# Patient Record
Sex: FEMALE | Race: WHITE | Employment: FULL TIME | URBAN - METROPOLITAN AREA
[De-identification: names, ages, dates, MRNs, and addresses within clinical notes are randomized per-mention and may not be internally consistent; named-entity substitution may affect disease eponyms.]

---

## 2017-08-14 ENCOUNTER — OFFICE VISIT (OUTPATIENT)
Dept: ORTHOPEDIC SURGERY | Age: 57
End: 2017-08-14

## 2017-08-14 VITALS
BODY MASS INDEX: 44.41 KG/M2 | DIASTOLIC BLOOD PRESSURE: 89 MMHG | WEIGHT: 293 LBS | HEART RATE: 103 BPM | SYSTOLIC BLOOD PRESSURE: 134 MMHG | HEIGHT: 68 IN

## 2017-08-14 DIAGNOSIS — G57.91 NEUROPATHY OF RIGHT LOWER EXTREMITY: ICD-10-CM

## 2017-08-14 DIAGNOSIS — Z96.651 STATUS POST TOTAL RIGHT KNEE REPLACEMENT: Primary | ICD-10-CM

## 2017-08-14 DIAGNOSIS — M54.41 LOW BACK PAIN WITH RADIATION, RIGHT: ICD-10-CM

## 2017-08-14 DIAGNOSIS — Z96.659 PAIN DUE TO TOTAL KNEE REPLACEMENT, SUBSEQUENT ENCOUNTER: ICD-10-CM

## 2017-08-14 DIAGNOSIS — T84.84XD PAIN DUE TO TOTAL KNEE REPLACEMENT, SUBSEQUENT ENCOUNTER: ICD-10-CM

## 2017-08-14 PROCEDURE — 73564 X-RAY EXAM KNEE 4 OR MORE: CPT | Performed by: ORTHOPAEDIC SURGERY

## 2017-08-14 PROCEDURE — 99214 OFFICE O/P EST MOD 30 MIN: CPT | Performed by: ORTHOPAEDIC SURGERY

## 2017-08-14 RX ORDER — DULOXETIN HYDROCHLORIDE 60 MG/1
60 CAPSULE, DELAYED RELEASE ORAL NIGHTLY
COMMUNITY
Start: 2017-07-22

## 2017-08-24 ENCOUNTER — HOSPITAL ENCOUNTER (OUTPATIENT)
Dept: PREADMISSION TESTING | Age: 57
Discharge: HOME | End: 2017-08-24
Admitting: ORTHOPAEDIC SURGERY

## 2017-08-24 ENCOUNTER — TELEPHONE (OUTPATIENT)
Dept: ORTHOPEDIC SURGERY | Age: 57
End: 2017-08-24

## 2017-08-24 VITALS — HEIGHT: 68 IN | WEIGHT: 293 LBS | BODY MASS INDEX: 44.41 KG/M2

## 2017-08-28 ENCOUNTER — OFFICE VISIT (OUTPATIENT)
Dept: ORTHOPEDIC SURGERY | Age: 57
End: 2017-08-28

## 2017-08-28 VITALS
HEIGHT: 68 IN | DIASTOLIC BLOOD PRESSURE: 78 MMHG | WEIGHT: 293 LBS | SYSTOLIC BLOOD PRESSURE: 138 MMHG | HEART RATE: 107 BPM | BODY MASS INDEX: 44.41 KG/M2

## 2017-08-28 DIAGNOSIS — Z96.651 STATUS POST TOTAL RIGHT KNEE REPLACEMENT: Primary | ICD-10-CM

## 2017-08-28 DIAGNOSIS — T84.012S FAILED TOTAL KNEE, RIGHT, SEQUELA: ICD-10-CM

## 2017-08-28 PROCEDURE — 99213 OFFICE O/P EST LOW 20 MIN: CPT | Performed by: ORTHOPAEDIC SURGERY

## 2017-08-28 RX ORDER — OXYCODONE HYDROCHLORIDE 10 MG/1
10 TABLET ORAL EVERY 4 HOURS PRN
Qty: 40 TABLET | Refills: 0 | Status: SHIPPED | OUTPATIENT
Start: 2017-08-28 | End: 2017-09-08

## 2017-09-01 ENCOUNTER — HOSPITAL ENCOUNTER (OUTPATIENT)
Dept: SURGERY | Age: 57
Discharge: HOME | End: 2017-09-01
Admitting: ORTHOPAEDIC SURGERY

## 2017-09-01 VITALS
HEART RATE: 96 BPM | OXYGEN SATURATION: 96 % | TEMPERATURE: 98.1 F | HEIGHT: 68 IN | SYSTOLIC BLOOD PRESSURE: 135 MMHG | RESPIRATION RATE: 14 BRPM | WEIGHT: 293 LBS | DIASTOLIC BLOOD PRESSURE: 76 MMHG | BODY MASS INDEX: 44.41 KG/M2

## 2017-09-01 RX ORDER — SODIUM CHLORIDE, SODIUM LACTATE, POTASSIUM CHLORIDE, CALCIUM CHLORIDE 600; 310; 30; 20 MG/100ML; MG/100ML; MG/100ML; MG/100ML
INJECTION, SOLUTION INTRAVENOUS CONTINUOUS
Status: DISCONTINUED | OUTPATIENT
Start: 2017-09-01 | End: 2017-09-02 | Stop reason: HOSPADM

## 2017-09-01 RX ORDER — SODIUM CHLORIDE 0.9 % (FLUSH) 0.9 %
10 SYRINGE (ML) INJECTION PRN
Status: DISCONTINUED | OUTPATIENT
Start: 2017-09-01 | End: 2017-09-02 | Stop reason: HOSPADM

## 2017-09-01 RX ORDER — HYDROCODONE BITARTRATE AND ACETAMINOPHEN 7.5; 325 MG/1; MG/1
2 TABLET ORAL EVERY 4 HOURS PRN
Status: DISCONTINUED | OUTPATIENT
Start: 2017-09-01 | End: 2017-09-02 | Stop reason: HOSPADM

## 2017-09-01 RX ORDER — MEPERIDINE HYDROCHLORIDE 25 MG/ML
12.5 INJECTION INTRAMUSCULAR; INTRAVENOUS; SUBCUTANEOUS EVERY 5 MIN PRN
Status: DISCONTINUED | OUTPATIENT
Start: 2017-09-01 | End: 2017-09-02 | Stop reason: HOSPADM

## 2017-09-01 RX ORDER — SCOLOPAMINE TRANSDERMAL SYSTEM 1 MG/1
1 PATCH, EXTENDED RELEASE TRANSDERMAL ONCE
Status: DISCONTINUED | OUTPATIENT
Start: 2017-09-01 | End: 2017-09-01

## 2017-09-01 RX ORDER — FENTANYL CITRATE 50 UG/ML
25 INJECTION, SOLUTION INTRAMUSCULAR; INTRAVENOUS EVERY 5 MIN PRN
Status: DISCONTINUED | OUTPATIENT
Start: 2017-09-01 | End: 2017-09-02 | Stop reason: HOSPADM

## 2017-09-01 RX ORDER — SODIUM CHLORIDE 0.9 % (FLUSH) 0.9 %
10 SYRINGE (ML) INJECTION EVERY 12 HOURS SCHEDULED
Status: DISCONTINUED | OUTPATIENT
Start: 2017-09-01 | End: 2017-09-02 | Stop reason: HOSPADM

## 2017-09-01 RX ORDER — LORAZEPAM 2 MG/ML
0.5 INJECTION INTRAMUSCULAR EVERY 6 HOURS PRN
Status: DISCONTINUED | OUTPATIENT
Start: 2017-09-01 | End: 2017-09-02 | Stop reason: HOSPADM

## 2017-09-01 RX ORDER — ONDANSETRON 2 MG/ML
4 INJECTION INTRAMUSCULAR; INTRAVENOUS
Status: ACTIVE | OUTPATIENT
Start: 2017-09-01 | End: 2017-09-01

## 2017-09-01 RX ORDER — LABETALOL HYDROCHLORIDE 5 MG/ML
5 INJECTION, SOLUTION INTRAVENOUS EVERY 10 MIN PRN
Status: DISCONTINUED | OUTPATIENT
Start: 2017-09-01 | End: 2017-09-02 | Stop reason: HOSPADM

## 2017-09-01 RX ORDER — ONDANSETRON 2 MG/ML
4 INJECTION INTRAMUSCULAR; INTRAVENOUS ONCE
Status: COMPLETED | OUTPATIENT
Start: 2017-09-01 | End: 2017-09-01

## 2017-09-01 RX ADMIN — ONDANSETRON 4 MG: 2 INJECTION INTRAMUSCULAR; INTRAVENOUS at 09:03

## 2017-09-01 RX ADMIN — SODIUM CHLORIDE, SODIUM LACTATE, POTASSIUM CHLORIDE, CALCIUM CHLORIDE: 600; 310; 30; 20 INJECTION, SOLUTION INTRAVENOUS at 09:04

## 2017-09-01 ASSESSMENT — PAIN - FUNCTIONAL ASSESSMENT
PAIN_FUNCTIONAL_ASSESSMENT: 0-10
PAIN_FUNCTIONAL_ASSESSMENT: 0-10

## 2017-09-01 ASSESSMENT — PAIN SCALES - GENERAL: PAINLEVEL_OUTOF10: 0

## 2017-09-02 LAB — MRSA SCREEN RT-PCR: NORMAL

## 2017-09-08 ENCOUNTER — OFFICE VISIT (OUTPATIENT)
Dept: ORTHOPEDIC SURGERY | Age: 57
End: 2017-09-08

## 2017-09-08 ENCOUNTER — HOSPITAL ENCOUNTER (OUTPATIENT)
Dept: PHYSICAL THERAPY | Age: 57
Discharge: HOME | End: 2017-09-30
Admitting: ORTHOPAEDIC SURGERY

## 2017-09-08 VITALS
DIASTOLIC BLOOD PRESSURE: 88 MMHG | HEART RATE: 97 BPM | HEIGHT: 68 IN | SYSTOLIC BLOOD PRESSURE: 133 MMHG | WEIGHT: 293 LBS | BODY MASS INDEX: 44.41 KG/M2

## 2017-09-08 DIAGNOSIS — Z96.651 STATUS POST TOTAL RIGHT KNEE REPLACEMENT: Primary | ICD-10-CM

## 2017-09-08 DIAGNOSIS — G89.29 CHRONIC PAIN OF RIGHT KNEE: ICD-10-CM

## 2017-09-08 DIAGNOSIS — M25.561 CHRONIC PAIN OF RIGHT KNEE: ICD-10-CM

## 2017-09-08 PROCEDURE — 99024 POSTOP FOLLOW-UP VISIT: CPT | Performed by: ORTHOPAEDIC SURGERY

## 2017-09-08 NOTE — PLAN OF CARE
[facility]                                                       Physical Therapy Certification    Dear Referring Practitioner: Casandra Keys MD,    We had the pleasure of evaluating the following patient for physical therapy services at [address] [place] [address]. A summary of our findings can be found in the initial assessment below. This includes our plan of care. If you have any questions or concerns regarding these findings, please do not hesitate to contact me at the office phone number checked above. Thank you for the referral.       Physician Signature:_______________________________Date:__________________  By signing above (or electronic signature), therapists plan is approved by physician      Patient: Mk Gutierrez   : 1960   MRN: [id number]  Referring Physician: Referring Practitioner: Casandra Keys MD      Evaluation Date: 2017      Medical Diagnosis Information:  Diagnosis: s/p patella revision  DOS: 17  ICD10: M80.280; M25.561   Treatment Diagnosis: Decreased ROM; decreased strength; gait abnormalities; decreased function; increased swelling                                         Insurance information: PT Insurance Information: [employer]     Precautions/ Contra-indications: revision surgery  Latex Allergy:  [x]NO      []YES  Preferred Language for Healthcare:   [x]English       []other:    SUBJECTIVE: Patient stated complaint: DOS= 17.  revision.  TKR. +ice. Pt has walked down from seeing the MD. She has had many surgeries on her knees in the past. She had a recent revision surgery. She would like to review the HEP, and she wants to do a HEP only. Pt presents without an assistive device. Pain meds prn.      Relevant Medical History: multiple knee surgeries; revision surgery  Functional Disability Index:PT G-Codes  Functional Assessment Tool mobilizations, manipulation. [x] Modalities as needed that may include: thermal agents, E-stim, Biofeedback, US, iontophoresis as indicated  [x] Patient education on joint protection, postural re-education, activity modification, progression of HEP. HEP instruction: Pt received a written HEP today. (see scanned forms)    GOALS:  Patient stated goal: \"Just want a refresher so I do not do too much. \"    Therapist goals for Patient:   Short Term Goals: To be achieved in: 2 weeks  1. Independent in HEP and progression per patient tolerance, in order to prevent re-injury. 2. Patient will have a decrease in pain to facilitate improvement in movement, function, and ADLs as indicated by Functional Deficits. Long Term Goals: To be achieved in: 12 weeks  1. Disability index score of 25% or less for the LEFS to assist with reaching prior level of function. 2. Patient will demonstrate increased [facility]OM to [facility] to allow for proper joint functioning as indicated by patients Functional Deficits. 3. Patient will demonstrate an increase in Strength to good proximal hip strength and control in LE to allow for proper functional mobility as indicated by patients Functional Deficits. 4. Patient will return to household functional activities without increased symptoms or restriction. 5. Pt will return to social activities without symptoms or restrictions.       Electronically signed by:  Sachi Garcia PT

## 2017-09-08 NOTE — FLOWSHEET NOTE
[facility]    Physical Therapy Daily Treatment Note  Date:  2017    Patient Name:  Ruthie Murillo    :  1960  MRN: [id number]  Restrictions/Precautions:    Medical/Treatment Diagnosis Information:  · Diagnosis: s/p patella revision  · DOS: 17  · ICD10: Z96.651; M25.561  · Treatment Diagnosis: Decreased ROM; decreased strength; gait abnormalities; decreased function; increased swelling  Insurance/Certification information:  PT Insurance Information: [employer]  Physician Information:  Referring Practitioner: Jenny Thrasher MD  Plan of care signed (Y/N):     Date of Patient follow up with Physician: 10-9-17    G-Code (if applicable):      Date G-Code Applied:  17  PT G-Codes  Functional Assessment Tool Used: LEFS  Score: 24%  Functional Limitation: Mobility: Walking and moving around  Mobility: Walking and Moving Around Current Status (): At least 60 percent but less than 80 percent impaired, limited or restricted  Mobility: Walking and Moving Around Goal Status ():  At least 1 percent but less than 20 percent impaired, limited or restricted    Progress Note: [x]  Yes  []  No  Next due by: Visit #10      Latex Allergy:  [x]NO      []YES  Preferred Language for Healthcare:   [x]English       []other:    Visit # Insurance Allowable   1      Pain level:  5/10     SUBJECTIVE:  See eval    OBJECTIVE: See eval  Observation:   Test measurements:      RESTRICTIONS/PRECAUTIONS: revision surgery    Exercises/Interventions:   Exercise/Equipment Resistance/Repetitions Other comments   Stretching     Hamstring [id number]    Towel Pull [id number]    Inclined Calf     Hip Flexion     ITB     Groin     Quad                    SLR     Supine 3x10    Abduction 3x10    Adduction 3x10    Prone 3x10    SLR+          Isometrics     Quad sets [id number]         Patellar Glides     Medial     Superior     Inferior          ROM     Sheet Pulls     [employer] [place] Passive Seated EOB [id number]    Active     Weight Shift     Ankle Pumps                    CKC     Calf raises     Wall sits     Step ups     1 leg stand     Squatting     CC TKE     Balance     bridges          PRE     Extension  RANGE:   Flexion  RANGE:        Quantum machines     Leg press      Leg extension     Leg curl          Manual interventions                     Therapeutic Exercise and NMR EXR  [x] (69312) Provided verbal/tactile cueing for activities related to strengthening, flexibility, endurance, ROM for improvements in LE, proximal hip, and core control with self care, mobility, lifting, ambulation.  [] (81243) Provided verbal/tactile cueing for activities related to improving balance, coordination, kinesthetic sense, posture, motor skill, proprioception  to assist with LE, proximal hip, and core control in self care, mobility, lifting, ambulation and eccentric single leg control.      NMR and Therapeutic Activities:    [] (66715 or 43337) Provided verbal/tactile cueing for activities related to improving balance, coordination, kinesthetic sense, posture, motor skill, proprioception and motor activation to allow for proper function of core, proximal hip and LE with self care and ADLs  [] (86185) Gait Re-education- Provided training and instruction to the patient for proper LE, core and proximal hip recruitment and positioning and eccentric body weight control with ambulation re-education including up and down stairs     Home Exercise Program:    [x] (56504) Reviewed/Progressed HEP activities related to strengthening, flexibility, endurance, ROM of core, proximal hip and LE for functional self-care, mobility, lifting and ambulation/stair navigation   [] (80807)Reviewed/Progressed HEP activities related to improving balance, coordination, kinesthetic sense, posture, motor skill, proprioception of core, proximal hip and LE for self care, mobility, lifting, and ambulation/stair navigation      Manual Treatments:  PROM / STM / Oscillations-Mobs:  G-I, II, III, IV (PA's, Inf., Post.)  [] (73673) Provided manual therapy to mobilize LE, proximal hip and/or LS spine soft tissue/joints for the purpose of modulating pain, promoting relaxation,  increasing ROM, reducing/eliminating soft tissue swelling/inflammation/restriction, improving soft tissue extensibility and allowing for proper ROM for normal function with self care, mobility, lifting and ambulation. Modalities:   Ice x 15 min    Charges:  Timed Code Treatment Minutes: 30   Total Treatment Minutes: 60       [] EVAL (LOW) 34481 (typically 20 minutes face-to-face)  [x] EVAL (MOD) 79016 (typically 30 minutes face-to-face)  [] EVAL (HIGH) 71853 (typically 45 minutes face-to-face)  [] RE-EVAL   [x] RQ(28165) x  2   [] IONTO  [] NMR (55065) x      [] VASO  [] Manual (48766) x       [] Other:  [] TA x       [] Mech Traction (07028)  [] ES(attended) (09415)      [] ES (un) (89601):       GOALS:  Patient stated goal: \"Just want a refresher so I do not do too much. \"     Therapist goals for Patient:   Short Term Goals: To be achieved in: 2 weeks  1. Independent in HEP and progression per patient tolerance, in order to prevent re-injury. 2. Patient will have a decrease in pain to facilitate improvement in movement, function, and ADLs as indicated by Functional Deficits.     Long Term Goals: To be achieved in: 12 weeks  1. Disability index score of 25% or less for the LEFS to assist with reaching prior level of function. 2. Patient will demonstrate increased AROM to [facility] to allow for proper joint functioning as indicated by patients Functional Deficits. 3. Patient will demonstrate an increase in Strength to good proximal hip strength and control in LE to allow for proper functional mobility as indicated by patients Functional Deficits. 4. Patient will return to household functional activities without increased symptoms or restriction.    5. Pt will return to

## 2017-09-12 RX ORDER — DIMETHICONE, CAMPHOR (SYNTHETIC), MENTHOL, AND PHENOL 1.1; .5; .625; .5 G/100G; G/100G; G/100G; G/100G
OINTMENT TOPICAL
Status: DISPENSED
Start: 2017-09-12 | End: 2017-09-12

## 2017-10-09 ENCOUNTER — OFFICE VISIT (OUTPATIENT)
Dept: ORTHOPEDIC SURGERY | Age: 57
End: 2017-10-09

## 2017-10-09 VITALS
HEART RATE: 109 BPM | BODY MASS INDEX: 44.41 KG/M2 | DIASTOLIC BLOOD PRESSURE: 84 MMHG | HEIGHT: 68 IN | WEIGHT: 293 LBS | SYSTOLIC BLOOD PRESSURE: 139 MMHG

## 2017-10-09 DIAGNOSIS — Z96.651 S/P REVISION OF TOTAL KNEE, RIGHT: ICD-10-CM

## 2017-10-09 DIAGNOSIS — M25.461 KNEE EFFUSION, RIGHT: ICD-10-CM

## 2017-10-09 PROCEDURE — 99024 POSTOP FOLLOW-UP VISIT: CPT | Performed by: ORTHOPAEDIC SURGERY

## 2017-10-09 PROCEDURE — 73562 X-RAY EXAM OF KNEE 3: CPT | Performed by: ORTHOPAEDIC SURGERY

## 2017-10-09 NOTE — PROGRESS NOTES
History of Present Illness:    Miriam Bal is a 60 y.o. female   . 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 The patella with clunk and synovitis. Surgery was approximately 5 weeks ago. She's doing well, but still has mild pain. She notes her pain as a 5 out of 10 but uses no external support      Chief Complaint , a summary of previous treatments, injury, and current reason for the visit:    Follow-up evaluation for the right knee, status post patella revision on previous rotating platform total knee    Examination:    Examination today confirms wounds are clean. There is no infection. Calves are soft. She is weightbearing as tolerated        Office Procedures:  Her most discomfort and pain is in the anterior aspect of the heel incision site. She does have a sub-vastus approach. Orders Placed This Encounter   Procedures    XR KNEE RIGHT (3 VIEWS)     Order Specific Question:   Reason for exam:     Answer:   Pain and discomfort right knee       X-ray interpretation:  X-rays:    3 view x-rays right knee. Total knee. An anatomic alignment. The area. The patella revision is perfectly aligned    Treatment Plan:  Right total knee with revision patella and synovectomy with mild discomfort, suprapatellar area. Mild patellofemoral inflammation    Final impresson and disposition: She is only 5 weeks from surgery. Recommended. Anti-inflammatories continue therapy. Her discomfort is mainly vastus medialis, which should get better will be seen again in 4 week follow-up                      Johana Murphy. Aleisha Darnell M.D. This dictation was performed with a verbal recognition program and it was checked for errors. It is possible that there are still dictated errors within this office note. Any errors should be brought immediately to my attention for correction.   All efforts were made to ensure that this office note is accurate

## 2017-11-20 ENCOUNTER — OFFICE VISIT (OUTPATIENT)
Dept: ORTHOPEDIC SURGERY | Age: 57
End: 2017-11-20

## 2017-11-20 VITALS
HEART RATE: 107 BPM | WEIGHT: 293 LBS | HEIGHT: 68 IN | SYSTOLIC BLOOD PRESSURE: 120 MMHG | BODY MASS INDEX: 44.41 KG/M2 | DIASTOLIC BLOOD PRESSURE: 72 MMHG

## 2017-11-20 DIAGNOSIS — Z96.651 STATUS POST TOTAL RIGHT KNEE REPLACEMENT: Primary | ICD-10-CM

## 2017-11-20 PROCEDURE — 99024 POSTOP FOLLOW-UP VISIT: CPT | Performed by: ORTHOPAEDIC SURGERY

## 2017-11-20 RX ORDER — MELOXICAM 15 MG/1
15 TABLET ORAL DAILY
Qty: 30 TABLET | Refills: 5 | Status: ON HOLD | OUTPATIENT
Start: 2017-11-20 | End: 2020-08-27

## 2017-11-20 NOTE — PROGRESS NOTES
Review of Systems   Musculoskeletal: Positive for joint pain.      Patient has had no medical changes since last evaluated
Any errors should be brought immediately to my attention for correction.   All efforts were made to ensure that this office note is accurate

## 2017-12-18 ENCOUNTER — OFFICE VISIT (OUTPATIENT)
Dept: ORTHOPEDIC SURGERY | Age: 57
End: 2017-12-18

## 2017-12-18 VITALS
DIASTOLIC BLOOD PRESSURE: 96 MMHG | SYSTOLIC BLOOD PRESSURE: 138 MMHG | BODY MASS INDEX: 44.41 KG/M2 | WEIGHT: 293 LBS | HEIGHT: 68 IN | HEART RATE: 110 BPM

## 2017-12-18 DIAGNOSIS — Z96.651 STATUS POST TOTAL RIGHT KNEE REPLACEMENT: Primary | ICD-10-CM

## 2017-12-18 PROCEDURE — 99213 OFFICE O/P EST LOW 20 MIN: CPT | Performed by: ORTHOPAEDIC SURGERY

## 2017-12-18 PROCEDURE — 20610 DRAIN/INJ JOINT/BURSA W/O US: CPT | Performed by: ORTHOPAEDIC SURGERY

## 2017-12-18 NOTE — PROGRESS NOTES
History of Present Illness:    Paxton Fernandez is a 59 y.o. female   . 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 She still has crepitation. At the time of her revision surgery. She was noted have severe crepitation and the initial treatment was to do simply a synovectomy, but significant polyethylene deterioration had occurred and a full patella revision with a slightly upsized patella was performed. On examination    Examination:    On examination today the right knee is clean. Wounds are clean. There is no infection. She has audible and mild palpable patellofemoral discomfort on extension from 30-0. This causes discomfort and pain in the suprapatellar area          Office Procedures:  No orders of the defined types were placed in this encounter. Treatment Plan:  Her x-ray review shows evidence of a rather aggressive prior Mendez osteotomy with almost a 1.5-2 cm tibial tubercle elevation and secondary patella baja with entrapment of the patella within the intercondylar notch of the posterior stabilized total knee. The polyethylene increase and oversizing the patella has improved this, but she still has discomfort. Recommendation is injection. Ultimately may require arthroscopic debridement of this area             PROCEDURE NOTE: Right KNEE ASPIRATION & INJECTION  12/18/2017 at 12:32 PM   Procedure: Aspiration & Injection  Verbal consent was obtained. Risks and benefits were explained. Questions were encouraged and answered.       Timeout Verification Completed including:    Correct patient: Paxton Fernandez was identified    Correct procedure    Correct site & side    Correct equipment and supplies          The aspiration/injection site (superolateral) was prepped with Chlora-Prep using aseptic technique and the knee aspiration and intra-articular injection was performed with ethyl chloride & 1% lidocaine (2 ml) for anesthetic:      ARTHROCENTESIS:  5 mL superior quadriceps tendon directly at the area of the synovitis    INJECTION:  Depomedrol 4ml (40 mg/ml = 160 mg total) was injected. A sterile adhesive dressing was applied. Post procedure: The patient tolerated the treatment well. Instructions to patient:  Appropriate post aspiration/injections instructions were given to the patient. Impression:  Right patella clunk. Synovitis. Mendez osteotomy with patella baja and infrapatellar contracture. Patella clunk syndrome with polyethylene entrapment due to mechanical changes from prior patellofemoral arthritis             Marcella Barahona. Aracely Recinos M.D. This dictation was performed with a verbal recognition program and it was checked for errors. It is possible that there are still dictated errors within this office note. Any errors should be brought immediately to my attention for correction.   All efforts were made to ensure that this office note is accurate

## 2018-02-05 ENCOUNTER — OFFICE VISIT (OUTPATIENT)
Dept: ORTHOPEDIC SURGERY | Age: 58
End: 2018-02-05

## 2018-02-05 VITALS
HEIGHT: 68 IN | WEIGHT: 293 LBS | HEART RATE: 87 BPM | SYSTOLIC BLOOD PRESSURE: 128 MMHG | DIASTOLIC BLOOD PRESSURE: 83 MMHG | BODY MASS INDEX: 44.41 KG/M2

## 2018-02-05 DIAGNOSIS — M25.561 RIGHT KNEE PAIN, UNSPECIFIED CHRONICITY: Primary | ICD-10-CM

## 2018-02-05 DIAGNOSIS — M17.11 PRIMARY OSTEOARTHRITIS OF RIGHT KNEE: ICD-10-CM

## 2018-02-05 DIAGNOSIS — Z96.659 PAINFUL TOTAL KNEE REPLACEMENT, INITIAL ENCOUNTER (HCC): ICD-10-CM

## 2018-02-05 DIAGNOSIS — T84.84XA PAINFUL TOTAL KNEE REPLACEMENT, INITIAL ENCOUNTER (HCC): ICD-10-CM

## 2018-02-05 PROCEDURE — 99213 OFFICE O/P EST LOW 20 MIN: CPT | Performed by: ORTHOPAEDIC SURGERY

## 2018-02-05 ASSESSMENT — ENCOUNTER SYMPTOMS: BACK PAIN: 1

## 2020-02-18 ENCOUNTER — TELEPHONE (OUTPATIENT)
Dept: ORTHOPEDIC SURGERY | Age: 60
End: 2020-02-18

## 2020-08-27 ENCOUNTER — HOSPITAL ENCOUNTER (OUTPATIENT)
Age: 60
Setting detail: OUTPATIENT SURGERY
Discharge: HOME | End: 2020-08-27
Attending: PAIN MEDICINE | Admitting: PAIN MEDICINE
Payer: COMMERCIAL

## 2020-08-27 VITALS
HEIGHT: 68 IN | DIASTOLIC BLOOD PRESSURE: 91 MMHG | HEART RATE: 90 BPM | TEMPERATURE: 97 F | SYSTOLIC BLOOD PRESSURE: 168 MMHG | OXYGEN SATURATION: 100 % | BODY MASS INDEX: 44.41 KG/M2 | RESPIRATION RATE: 18 BRPM | WEIGHT: 293 LBS

## 2020-08-27 PROCEDURE — 6360000002 HC RX W HCPCS: Performed by: PAIN MEDICINE

## 2020-08-27 PROCEDURE — 3600000002 HC SURGERY LEVEL 2 BASE: Performed by: PAIN MEDICINE

## 2020-08-27 PROCEDURE — 7100000010 HC PHASE II RECOVERY - FIRST 15 MIN: Performed by: PAIN MEDICINE

## 2020-08-27 PROCEDURE — 2500000003 HC RX 250 WO HCPCS: Performed by: PAIN MEDICINE

## 2020-08-27 PROCEDURE — 27096 INJECT SACROILIAC JOINT: CPT | Performed by: PAIN MEDICINE

## 2020-08-27 PROCEDURE — 6360000004 HC RX CONTRAST MEDICATION: Performed by: PAIN MEDICINE

## 2020-08-27 PROCEDURE — 2709999900 HC NON-CHARGEABLE SUPPLY: Performed by: PAIN MEDICINE

## 2020-08-27 RX ORDER — LIDOCAINE HYDROCHLORIDE 10 MG/ML
INJECTION, SOLUTION EPIDURAL; INFILTRATION; INTRACAUDAL; PERINEURAL PRN
Status: DISCONTINUED | OUTPATIENT
Start: 2020-08-27 | End: 2020-08-27 | Stop reason: ALTCHOICE

## 2020-08-27 RX ORDER — METHYLPREDNISOLONE ACETATE 80 MG/ML
INJECTION, SUSPENSION INTRA-ARTICULAR; INTRALESIONAL; INTRAMUSCULAR; SOFT TISSUE
Status: DISCONTINUED
Start: 2020-08-27 | End: 2020-08-27 | Stop reason: HOSPADM

## 2020-08-27 RX ORDER — CYCLOBENZAPRINE HCL 10 MG
10 TABLET ORAL NIGHTLY
COMMUNITY

## 2020-08-27 ASSESSMENT — PAIN DESCRIPTION - DESCRIPTORS: DESCRIPTORS: PRESSURE

## 2020-08-27 ASSESSMENT — PAIN - FUNCTIONAL ASSESSMENT
PAIN_FUNCTIONAL_ASSESSMENT: 0-10
PAIN_FUNCTIONAL_ASSESSMENT: PREVENTS OR INTERFERES WITH MANY ACTIVE NOT PASSIVE ACTIVITIES

## 2020-08-27 NOTE — PROCEDURES
Angie Moore is a 62 y.o. female patient. No diagnosis found. Past Medical History:   Diagnosis Date    Anxiety     Asthma     low O 2 SATS POST-OP    GERD (gastroesophageal reflux disease)     Herniated lumbar intervertebral disc     l4    IBS (irritable bowel syndrome)     Migraine     Nausea & vomiting     OA (osteoarthritis)     Sleep apnea     PT STATES \"PROBABLE\"    UTI (urinary tract infection)     FREQUENT     Blood pressure (!) 152/80, pulse 93, temperature 97 °F (36.1 °C), temperature source Temporal, resp. rate 20, height 5' 8\" (1.727 m), weight (!) 308 lb (139.7 kg), SpO2 100 %. Procedures    Francois Laguerre MD  8/27/2020  INDICATIONS:  Sacroilitis 720.2, M46.1  OPERATIVE PROCEDURE:  B    SACROILIAC INJECTION 94674 with 22656,    Consent was signed by the patient after the risks and benefits were explained. With the patient in the prone position, the back was prepped with Prevail and draped. Aseptic technique was used at every stage of the procedure. Flouroscopic angulation was used to separate the medial and lateral joint lines of the posterior aspect of the sacroiliac joint  Skin infiltration was with 0.5 cc of 1% Lidocaine using a 30-gauge needle followed by placement of a _22__ -gauge needle _5__ -inch needle using fluoroscopic guidance in the posterior inferior aspect of the _L__ side medial sacroiliac joint line. Aspiration was negative for CSF or blood . __1.5__cc of _1__ % of lidocaine with _40__ mg DEPOMEDROL was injected and the needle was pulled out intact. The patient tolerated the procedure well and discharge instructions were given. FACETS AND TF CAN BE DONE AND RF ONLY AT S1    ESTIMATED BLOOD LOSS:  Less than 1 cc       Exact similar procedure was / was not done on the _R__ side. Dye was injected and the spread was seen OK.  MIDPART         ________________________________                   Momo Ackerman M.D.

## 2020-10-22 ENCOUNTER — HOSPITAL ENCOUNTER (OUTPATIENT)
Age: 60
Setting detail: OUTPATIENT SURGERY
Discharge: HOME | End: 2020-10-22
Attending: PAIN MEDICINE | Admitting: PAIN MEDICINE
Payer: COMMERCIAL

## 2020-10-22 VITALS
HEART RATE: 94 BPM | SYSTOLIC BLOOD PRESSURE: 126 MMHG | DIASTOLIC BLOOD PRESSURE: 91 MMHG | WEIGHT: 293 LBS | BODY MASS INDEX: 44.41 KG/M2 | OXYGEN SATURATION: 100 % | RESPIRATION RATE: 18 BRPM | HEIGHT: 68 IN | TEMPERATURE: 97.1 F

## 2020-10-22 PROCEDURE — 7100000010 HC PHASE II RECOVERY - FIRST 15 MIN: Performed by: PAIN MEDICINE

## 2020-10-22 PROCEDURE — 3600000002 HC SURGERY LEVEL 2 BASE: Performed by: PAIN MEDICINE

## 2020-10-22 PROCEDURE — 2500000003 HC RX 250 WO HCPCS: Performed by: PAIN MEDICINE

## 2020-10-22 PROCEDURE — 2709999900 HC NON-CHARGEABLE SUPPLY: Performed by: PAIN MEDICINE

## 2020-10-22 PROCEDURE — 6360000004 HC RX CONTRAST MEDICATION: Performed by: PAIN MEDICINE

## 2020-10-22 PROCEDURE — 6360000002 HC RX W HCPCS: Performed by: PAIN MEDICINE

## 2020-10-22 PROCEDURE — 27096 INJECT SACROILIAC JOINT: CPT | Performed by: PAIN MEDICINE

## 2020-10-22 RX ORDER — LIDOCAINE HYDROCHLORIDE 10 MG/ML
INJECTION, SOLUTION EPIDURAL; INFILTRATION; INTRACAUDAL; PERINEURAL PRN
Status: DISCONTINUED | OUTPATIENT
Start: 2020-10-22 | End: 2020-10-22 | Stop reason: ALTCHOICE

## 2020-10-22 ASSESSMENT — PAIN DESCRIPTION - DESCRIPTORS: DESCRIPTORS: ACHING

## 2020-10-22 ASSESSMENT — PAIN - FUNCTIONAL ASSESSMENT
PAIN_FUNCTIONAL_ASSESSMENT: 0-10
PAIN_FUNCTIONAL_ASSESSMENT: PREVENTS OR INTERFERES SOME ACTIVE ACTIVITIES AND ADLS

## 2020-10-22 NOTE — PROCEDURES
Esetban Varner is a 62 y.o. female patient. No diagnosis found. Past Medical History:   Diagnosis Date    Anxiety     Asthma     low O 2 SATS POST-OP    GERD (gastroesophageal reflux disease)     Herniated lumbar intervertebral disc     l4    IBS (irritable bowel syndrome)     Migraine     Nausea & vomiting     OA (osteoarthritis)     Sleep apnea     PT STATES \"PROBABLE\"    UTI (urinary tract infection)     FREQUENT     Blood pressure (!) 126/91, pulse 94, temperature 97.1 °F (36.2 °C), temperature source Temporal, resp. rate 18, height 5' 8\" (1.727 m), weight (!) 308 lb (139.7 kg), SpO2 100 %. Procedures    Sonia Jung MD  10/22/2020  INDICATIONS:  Sacroilitis 720.2, M46.1  OPERATIVE PROCEDURE:  L    SACROILIAC INJECTION 89652 with 37375,    Consent was signed by the patient after the risks and benefits were explained. With the patient in the prone position, the back was prepped with Prevail and draped. Aseptic technique was used at every stage of the procedure. Flouroscopic angulation was used to separate the medial and lateral joint lines of the posterior aspect of the sacroiliac joint  Skin infiltration was with 0.5 cc of 1% Lidocaine using a 30-gauge needle followed by placement of a _22__ -gauge needle _5__ -inch needle using fluoroscopic guidance in the posterior inferior aspect of the _L__ side medial sacroiliac joint line. Aspiration was negative for CSF or blood . __1__cc of _1__ % of lidocaine with _80__ mg DEPOMEDROL was injected and the needle was pulled out intact. The patient tolerated the procedure well and discharge instructions were given. C/O PAIN MOSTLY L    ESTIMATED BLOOD LOSS:  Less than 1 cc    Dye was injected and the spread was seen OK.  MIDPOINT.         ________________________________                   Quentin Buchanan M.D.

## 2020-12-09 ENCOUNTER — HOSPITAL ENCOUNTER (OUTPATIENT)
Age: 60
Setting detail: OBSERVATION
Discharge: HOME | End: 2020-12-11
Attending: EMERGENCY MEDICINE | Admitting: INTERNAL MEDICINE
Payer: COMMERCIAL

## 2020-12-09 PROCEDURE — 99284 EMERGENCY DEPT VISIT MOD MDM: CPT

## 2020-12-09 RX ORDER — SENNA AND DOCUSATE SODIUM 50; 8.6 MG/1; MG/1
1 TABLET, FILM COATED ORAL NIGHTLY
COMMUNITY
End: 2022-04-05

## 2020-12-10 ENCOUNTER — APPOINTMENT (OUTPATIENT)
Dept: GENERAL RADIOLOGY | Age: 60
End: 2020-12-10
Payer: COMMERCIAL

## 2020-12-10 ENCOUNTER — APPOINTMENT (OUTPATIENT)
Dept: CT IMAGING | Age: 60
End: 2020-12-10
Payer: COMMERCIAL

## 2020-12-10 PROBLEM — R29.90 STROKE-LIKE SYMPTOMS: Status: ACTIVE | Noted: 2020-12-10

## 2020-12-10 LAB
ANION GAP SERPL CALCULATED.3IONS-SCNC: 11 MMOL/L (ref 3–16)
ANION GAP SERPL CALCULATED.3IONS-SCNC: 12 MMOL/L (ref 3–16)
BASOPHILS ABSOLUTE: 0.1 K/UL (ref 0–0.2)
BASOPHILS ABSOLUTE: 0.1 K/UL (ref 0–0.2)
BASOPHILS RELATIVE PERCENT: 1 %
BASOPHILS RELATIVE PERCENT: 1.4 %
BUN BLDV-MCNC: 12 MG/DL (ref 7–20)
BUN BLDV-MCNC: 13 MG/DL (ref 7–20)
CALCIUM SERPL-MCNC: 9 MG/DL (ref 8.3–10.6)
CALCIUM SERPL-MCNC: 9.3 MG/DL (ref 8.3–10.6)
CHLORIDE BLD-SCNC: 102 MMOL/L (ref 99–110)
CHLORIDE BLD-SCNC: 104 MMOL/L (ref 99–110)
CHOLESTEROL, TOTAL: 143 MG/DL (ref 0–199)
CO2: 21 MMOL/L (ref 21–32)
CO2: 25 MMOL/L (ref 21–32)
CREAT SERPL-MCNC: 0.6 MG/DL (ref 0.6–1.2)
CREAT SERPL-MCNC: <0.5 MG/DL (ref 0.6–1.2)
EKG ATRIAL RATE: 84 BPM
EKG DIAGNOSIS: NORMAL
EKG P AXIS: 62 DEGREES
EKG P-R INTERVAL: 164 MS
EKG Q-T INTERVAL: 390 MS
EKG QRS DURATION: 90 MS
EKG QTC CALCULATION (BAZETT): 460 MS
EKG R AXIS: 49 DEGREES
EKG T AXIS: 60 DEGREES
EKG VENTRICULAR RATE: 84 BPM
EOSINOPHILS ABSOLUTE: 0.2 K/UL (ref 0–0.6)
EOSINOPHILS ABSOLUTE: 0.3 K/UL (ref 0–0.6)
EOSINOPHILS RELATIVE PERCENT: 2.7 %
EOSINOPHILS RELATIVE PERCENT: 2.8 %
ESTIMATED AVERAGE GLUCOSE: 122.6 MG/DL
GFR AFRICAN AMERICAN: >60
GFR AFRICAN AMERICAN: >60
GFR NON-AFRICAN AMERICAN: >60
GFR NON-AFRICAN AMERICAN: >60
GLUCOSE BLD-MCNC: 108 MG/DL (ref 70–99)
GLUCOSE BLD-MCNC: 116 MG/DL (ref 70–99)
HBA1C MFR BLD: 5.9 %
HCT VFR BLD CALC: 37.3 % (ref 36–48)
HCT VFR BLD CALC: 38.2 % (ref 36–48)
HDLC SERPL-MCNC: 49 MG/DL (ref 40–60)
HEMOGLOBIN: 11.7 G/DL (ref 12–16)
HEMOGLOBIN: 11.9 G/DL (ref 12–16)
INR BLD: 0.99 (ref 0.86–1.14)
LDL CHOLESTEROL CALCULATED: 75 MG/DL
LV EF: 60 %
LVEF MODALITY: NORMAL
LYMPHOCYTES ABSOLUTE: 2.8 K/UL (ref 1–5.1)
LYMPHOCYTES ABSOLUTE: 3.4 K/UL (ref 1–5.1)
LYMPHOCYTES RELATIVE PERCENT: 34.5 %
LYMPHOCYTES RELATIVE PERCENT: 37.7 %
MCH RBC QN AUTO: 23.5 PG (ref 26–34)
MCH RBC QN AUTO: 23.6 PG (ref 26–34)
MCHC RBC AUTO-ENTMCNC: 31.1 G/DL (ref 31–36)
MCHC RBC AUTO-ENTMCNC: 31.5 G/DL (ref 31–36)
MCV RBC AUTO: 74.9 FL (ref 80–100)
MCV RBC AUTO: 75.5 FL (ref 80–100)
MONOCYTES ABSOLUTE: 0.6 K/UL (ref 0–1.3)
MONOCYTES ABSOLUTE: 0.9 K/UL (ref 0–1.3)
MONOCYTES RELATIVE PERCENT: 7.6 %
MONOCYTES RELATIVE PERCENT: 9 %
NEUTROPHILS ABSOLUTE: 3.8 K/UL (ref 1.7–7.7)
NEUTROPHILS ABSOLUTE: 5.2 K/UL (ref 1.7–7.7)
NEUTROPHILS RELATIVE PERCENT: 50.9 %
NEUTROPHILS RELATIVE PERCENT: 52.4 %
PDW BLD-RTO: 17 % (ref 12.4–15.4)
PDW BLD-RTO: 17.2 % (ref 12.4–15.4)
PLATELET # BLD: 205 K/UL (ref 135–450)
PLATELET # BLD: ABNORMAL K/UL (ref 135–450)
PLATELET SLIDE REVIEW: ABNORMAL
PMV BLD AUTO: 8.8 FL (ref 5–10.5)
PMV BLD AUTO: ABNORMAL FL (ref 5–10.5)
POTASSIUM REFLEX MAGNESIUM: 4 MMOL/L (ref 3.5–5.1)
POTASSIUM REFLEX MAGNESIUM: 4.9 MMOL/L (ref 3.5–5.1)
PROTHROMBIN TIME: 11.5 SEC (ref 10–13.2)
RBC # BLD: 4.97 M/UL (ref 4–5.2)
RBC # BLD: 5.06 M/UL (ref 4–5.2)
SLIDE REVIEW: ABNORMAL
SODIUM BLD-SCNC: 137 MMOL/L (ref 136–145)
SODIUM BLD-SCNC: 138 MMOL/L (ref 136–145)
TRIGL SERPL-MCNC: 95 MG/DL (ref 0–150)
TROPONIN: <0.01 NG/ML
VLDLC SERPL CALC-MCNC: 19 MG/DL
WBC # BLD: 7.4 K/UL (ref 4–11)
WBC # BLD: 9.8 K/UL (ref 4–11)

## 2020-12-10 PROCEDURE — 2580000003 HC RX 258: Performed by: INTERNAL MEDICINE

## 2020-12-10 PROCEDURE — 97162 PT EVAL MOD COMPLEX 30 MIN: CPT

## 2020-12-10 PROCEDURE — 96372 THER/PROPH/DIAG INJ SC/IM: CPT

## 2020-12-10 PROCEDURE — G0378 HOSPITAL OBSERVATION PER HR: HCPCS

## 2020-12-10 PROCEDURE — 85025 COMPLETE CBC W/AUTO DIFF WBC: CPT

## 2020-12-10 PROCEDURE — 70496 CT ANGIOGRAPHY HEAD: CPT

## 2020-12-10 PROCEDURE — 70450 CT HEAD/BRAIN W/O DYE: CPT

## 2020-12-10 PROCEDURE — 80061 LIPID PANEL: CPT

## 2020-12-10 PROCEDURE — 6370000000 HC RX 637 (ALT 250 FOR IP): Performed by: INTERNAL MEDICINE

## 2020-12-10 PROCEDURE — 93005 ELECTROCARDIOGRAM TRACING: CPT | Performed by: EMERGENCY MEDICINE

## 2020-12-10 PROCEDURE — 84484 ASSAY OF TROPONIN QUANT: CPT

## 2020-12-10 PROCEDURE — 85610 PROTHROMBIN TIME: CPT

## 2020-12-10 PROCEDURE — 97165 OT EVAL LOW COMPLEX 30 MIN: CPT

## 2020-12-10 PROCEDURE — 6360000002 HC RX W HCPCS: Performed by: NURSE PRACTITIONER

## 2020-12-10 PROCEDURE — 97530 THERAPEUTIC ACTIVITIES: CPT

## 2020-12-10 PROCEDURE — 71045 X-RAY EXAM CHEST 1 VIEW: CPT

## 2020-12-10 PROCEDURE — 6360000002 HC RX W HCPCS: Performed by: INTERNAL MEDICINE

## 2020-12-10 PROCEDURE — 6370000000 HC RX 637 (ALT 250 FOR IP): Performed by: NURSE PRACTITIONER

## 2020-12-10 PROCEDURE — 80048 BASIC METABOLIC PNL TOTAL CA: CPT

## 2020-12-10 PROCEDURE — 93306 TTE W/DOPPLER COMPLETE: CPT

## 2020-12-10 PROCEDURE — 93010 ELECTROCARDIOGRAM REPORT: CPT | Performed by: INTERNAL MEDICINE

## 2020-12-10 PROCEDURE — 36415 COLL VENOUS BLD VENIPUNCTURE: CPT

## 2020-12-10 PROCEDURE — 83036 HEMOGLOBIN GLYCOSYLATED A1C: CPT

## 2020-12-10 PROCEDURE — 97535 SELF CARE MNGMENT TRAINING: CPT

## 2020-12-10 PROCEDURE — 6360000004 HC RX CONTRAST MEDICATION: Performed by: EMERGENCY MEDICINE

## 2020-12-10 RX ORDER — ATORVASTATIN CALCIUM 80 MG/1
80 TABLET, FILM COATED ORAL NIGHTLY
Status: DISCONTINUED | OUTPATIENT
Start: 2020-12-10 | End: 2020-12-11 | Stop reason: HOSPADM

## 2020-12-10 RX ORDER — PROMETHAZINE HYDROCHLORIDE 25 MG/1
12.5 TABLET ORAL EVERY 6 HOURS PRN
Status: DISCONTINUED | OUTPATIENT
Start: 2020-12-10 | End: 2020-12-11 | Stop reason: HOSPADM

## 2020-12-10 RX ORDER — ACETAMINOPHEN 325 MG/1
650 TABLET ORAL EVERY 6 HOURS PRN
Status: DISCONTINUED | OUTPATIENT
Start: 2020-12-10 | End: 2020-12-11 | Stop reason: HOSPADM

## 2020-12-10 RX ORDER — ALBUTEROL SULFATE 2.5 MG/3ML
2.5 SOLUTION RESPIRATORY (INHALATION) EVERY 4 HOURS PRN
Status: DISCONTINUED | OUTPATIENT
Start: 2020-12-10 | End: 2020-12-11 | Stop reason: HOSPADM

## 2020-12-10 RX ORDER — ONDANSETRON 2 MG/ML
4 INJECTION INTRAMUSCULAR; INTRAVENOUS EVERY 4 HOURS PRN
Status: DISCONTINUED | OUTPATIENT
Start: 2020-12-10 | End: 2020-12-11 | Stop reason: HOSPADM

## 2020-12-10 RX ORDER — ASPIRIN 81 MG/1
81 TABLET ORAL DAILY
Status: DISCONTINUED | OUTPATIENT
Start: 2020-12-10 | End: 2020-12-11 | Stop reason: HOSPADM

## 2020-12-10 RX ORDER — CYCLOBENZAPRINE HCL 10 MG
10 TABLET ORAL 3 TIMES DAILY PRN
Status: DISCONTINUED | OUTPATIENT
Start: 2020-12-10 | End: 2020-12-11 | Stop reason: HOSPADM

## 2020-12-10 RX ORDER — MONTELUKAST SODIUM 10 MG/1
10 TABLET ORAL NIGHTLY
Status: DISCONTINUED | OUTPATIENT
Start: 2020-12-10 | End: 2020-12-11 | Stop reason: HOSPADM

## 2020-12-10 RX ORDER — SODIUM CHLORIDE 0.9 % (FLUSH) 0.9 %
10 SYRINGE (ML) INJECTION EVERY 12 HOURS SCHEDULED
Status: DISCONTINUED | OUTPATIENT
Start: 2020-12-10 | End: 2020-12-11 | Stop reason: HOSPADM

## 2020-12-10 RX ORDER — SODIUM CHLORIDE 0.9 % (FLUSH) 0.9 %
10 SYRINGE (ML) INJECTION PRN
Status: DISCONTINUED | OUTPATIENT
Start: 2020-12-10 | End: 2020-12-11 | Stop reason: HOSPADM

## 2020-12-10 RX ORDER — PANTOPRAZOLE SODIUM 40 MG/1
40 TABLET, DELAYED RELEASE ORAL
Status: DISCONTINUED | OUTPATIENT
Start: 2020-12-10 | End: 2020-12-11 | Stop reason: HOSPADM

## 2020-12-10 RX ORDER — ATORVASTATIN CALCIUM 10 MG/1
10 TABLET, FILM COATED ORAL DAILY
Status: ON HOLD | COMMUNITY
End: 2020-12-11 | Stop reason: HOSPADM

## 2020-12-10 RX ORDER — DULOXETIN HYDROCHLORIDE 60 MG/1
60 CAPSULE, DELAYED RELEASE ORAL DAILY
Status: DISCONTINUED | OUTPATIENT
Start: 2020-12-10 | End: 2020-12-11 | Stop reason: HOSPADM

## 2020-12-10 RX ORDER — ROPINIROLE 1 MG/1
3 TABLET, FILM COATED ORAL NIGHTLY
Status: DISCONTINUED | OUTPATIENT
Start: 2020-12-10 | End: 2020-12-11 | Stop reason: HOSPADM

## 2020-12-10 RX ORDER — ASPIRIN 300 MG/1
300 SUPPOSITORY RECTAL DAILY
Status: DISCONTINUED | OUTPATIENT
Start: 2020-12-10 | End: 2020-12-11 | Stop reason: HOSPADM

## 2020-12-10 RX ORDER — POLYETHYLENE GLYCOL 3350 17 G/17G
17 POWDER, FOR SOLUTION ORAL DAILY PRN
Status: DISCONTINUED | OUTPATIENT
Start: 2020-12-10 | End: 2020-12-11 | Stop reason: HOSPADM

## 2020-12-10 RX ADMIN — SODIUM CHLORIDE, PRESERVATIVE FREE 10 ML: 5 INJECTION INTRAVENOUS at 08:36

## 2020-12-10 RX ADMIN — ACETAMINOPHEN 650 MG: 325 TABLET ORAL at 12:18

## 2020-12-10 RX ADMIN — ACETAMINOPHEN 650 MG: 325 TABLET ORAL at 06:32

## 2020-12-10 RX ADMIN — PANTOPRAZOLE SODIUM 40 MG: 40 TABLET, DELAYED RELEASE ORAL at 06:28

## 2020-12-10 RX ADMIN — ROPINIROLE HYDROCHLORIDE 3 MG: 1 TABLET, FILM COATED ORAL at 20:22

## 2020-12-10 RX ADMIN — MONTELUKAST 10 MG: 10 TABLET, FILM COATED ORAL at 20:22

## 2020-12-10 RX ADMIN — ASPIRIN 81 MG: 81 TABLET, COATED ORAL at 08:36

## 2020-12-10 RX ADMIN — CYCLOBENZAPRINE 10 MG: 10 TABLET, FILM COATED ORAL at 06:32

## 2020-12-10 RX ADMIN — ENOXAPARIN SODIUM 40 MG: 40 INJECTION SUBCUTANEOUS at 20:23

## 2020-12-10 RX ADMIN — ATORVASTATIN CALCIUM 80 MG: 80 TABLET, FILM COATED ORAL at 20:22

## 2020-12-10 RX ADMIN — SODIUM CHLORIDE, PRESERVATIVE FREE 10 ML: 5 INJECTION INTRAVENOUS at 20:23

## 2020-12-10 RX ADMIN — ENOXAPARIN SODIUM 40 MG: 40 INJECTION SUBCUTANEOUS at 08:36

## 2020-12-10 RX ADMIN — DULOXETINE HYDROCHLORIDE 60 MG: 60 CAPSULE, DELAYED RELEASE ORAL at 08:36

## 2020-12-10 RX ADMIN — IOPAMIDOL 75 ML: 755 INJECTION, SOLUTION INTRAVENOUS at 00:59

## 2020-12-10 ASSESSMENT — PAIN DESCRIPTION - DESCRIPTORS
DESCRIPTORS: HEADACHE
DESCRIPTORS: ACHING;PINS AND NEEDLES
DESCRIPTORS: BURNING

## 2020-12-10 ASSESSMENT — ENCOUNTER SYMPTOMS
SHORTNESS OF BREATH: 0
ABDOMINAL PAIN: 0
COLOR CHANGE: 0
EYE ITCHING: 0
VOMITING: 0
CONSTIPATION: 0
COUGH: 0
EYE DISCHARGE: 0

## 2020-12-10 ASSESSMENT — PAIN DESCRIPTION - PROGRESSION
CLINICAL_PROGRESSION: GRADUALLY WORSENING
CLINICAL_PROGRESSION: NOT CHANGED

## 2020-12-10 ASSESSMENT — PAIN DESCRIPTION - LOCATION
LOCATION: LEG
LOCATION: BACK;KNEE
LOCATION: HEAD

## 2020-12-10 ASSESSMENT — PAIN DESCRIPTION - ONSET
ONSET: ON-GOING
ONSET: PROGRESSIVE
ONSET: ON-GOING

## 2020-12-10 ASSESSMENT — PAIN - FUNCTIONAL ASSESSMENT
PAIN_FUNCTIONAL_ASSESSMENT: ACTIVITIES ARE NOT PREVENTED

## 2020-12-10 ASSESSMENT — PAIN DESCRIPTION - PAIN TYPE
TYPE: CHRONIC PAIN;NEUROPATHIC PAIN
TYPE: ACUTE PAIN
TYPE: CHRONIC PAIN

## 2020-12-10 ASSESSMENT — PAIN SCALES - WONG BAKER
WONGBAKER_NUMERICALRESPONSE: 0

## 2020-12-10 ASSESSMENT — PAIN DESCRIPTION - ORIENTATION
ORIENTATION: LOWER
ORIENTATION: LEFT
ORIENTATION: MID

## 2020-12-10 ASSESSMENT — PAIN SCALES - GENERAL
PAINLEVEL_OUTOF10: 4
PAINLEVEL_OUTOF10: 3
PAINLEVEL_OUTOF10: 6

## 2020-12-10 ASSESSMENT — PAIN DESCRIPTION - FREQUENCY
FREQUENCY: CONTINUOUS

## 2020-12-10 NOTE — PROGRESS NOTES
Pt arrived to floor via stretcher from ED and ambulated to bed. NIHSS performed with ED RN - NIH: 1. Telemetry box #3 activated and confirmed with CMU Patient oriented to room and use of call light. Call light and personal items within reach. Admission and assessment initiated. POC and education initiated and reviewed with patient. Denied further needs or questions at this time. Will continue to monitor.     Electronically signed by Marylee Keas, RN on 12/10/2020 at 3:20 AM

## 2020-12-10 NOTE — CONSULTS
Neurology Consult Note  Reason for Consult: stroke like symptoms    Chief complaint: weakness, trouble talking. Dr Carmen Bishop MD asked me to see Risa Salas in consultation for evaluation of stroke like symptoms    History of Present Illness:  Risa Salas is a 62 y.o. female who presents with weakness, trouble talking. I obtained my information via interview w/ the patient, supplemented by chart review. Around 2200 last evening, the patient was preparing for bed when she began having difficulty using her phone and iPad. She says both of her hands felt weak, though R>L. She could not think straight. When she would talk, nothing legible would come out. She was able to call her daughter (thought she was calling her  w/ whom she lives). After family discussion and recognizing that she was continuing to have symptoms, EMS was called. After about 25 minutes of symptoms, the patient's only complaint was just feeling fuzzy. BP was 133/87. CT head and CTA head/neck were unremarkable. The patient says here she noticed her RUE felt rubbery and her hand was weak again. Intermittently she was having trouble talking.  Stroke Team was involved and did not recommend TPA. Currently, she continues to have RUE weakness/clumsiness. Denies ever experiencing anything like this in the past.  No hx of stroke. Does have hx of migraines. She has not recently had headaches, but noticed over the past few weeks that she has been simply getting a visual aura twice a day for a variable amount of time. She does have a spinal cord stimulator and follows w/ pain management.       Medical History:  Past Medical History:   Diagnosis Date    Anxiety     Asthma     low O 2 SATS POST-OP    GERD (gastroesophageal reflux disease)     Herniated lumbar intervertebral disc     l4    IBS (irritable bowel syndrome)     Migraine     Nausea & vomiting     OA (osteoarthritis)  Sleep apnea     PT STATES \"PROBABLE\"    UTI (urinary tract infection)     FREQUENT     Past Surgical History:   Procedure Laterality Date    BREAST LUMPECTOMY      CHOLECYSTECTOMY, LAPAROSCOPIC      ENDOMETRIAL ABLATION      HYSTERECTOMY      JOINT REPLACEMENT      BILAT KNEES    LUMBAR SPINE SURGERY      stimulator in place    OTHER SURGICAL HISTORY  6/20/11    lap gastric sleeve    OTHER SURGICAL HISTORY Right 09/01/2017    RIGHT PATELLA REVISION      PAIN MANAGEMENT PROCEDURE Bilateral 8/27/2020    BILATERAL SACROILIAC JOINT INJECTION SITE CONFIRMED BY FLUOROSCOPY performed by Benjamín Jimenez MD at [address] Bilateral 10/22/2020    BILATERAL SACROILIAC JOINT INJECTION SITE CONFIRMED BY FLUOROSCOPY performed by Benjamín Jimenez MD at [address] SOFT TISSUE TUMOR RESECTION  4-16-10    resection of open wound and mass of suprapubic area    TUBAL LIGATION       Scheduled Meds:   rOPINIRole  3 mg Oral Nightly    pantoprazole  40 mg Oral QAM AC    montelukast  10 mg Oral Nightly    DULoxetine  60 mg Oral Daily    sodium chloride flush  10 mL Intravenous 2 times per day    enoxaparin  40 mg Subcutaneous Daily    aspirin  81 mg Oral Daily    Or    aspirin  300 mg Rectal Daily    atorvastatin  80 mg Oral Nightly     Medications Prior to Admission:   atorvastatin (LIPITOR) 10 MG tablet, Take 10 mg by mouth daily  sennosides-docusate sodium (SENOKOT-S) 8.6-50 MG tablet, Take 1 tablet by mouth daily  cyclobenzaprine (FLEXERIL) 10 MG tablet, Take 10 mg by mouth 3 times daily as needed for Muscle spasms (patient unsure of dose)  DULoxetine (CYMBALTA) 60 MG extended release capsule,   rOPINIRole (REQUIP) 2 MG tablet, Take 3 mg by mouth nightly   montelukast (SINGULAIR) 10 MG tablet, Take 10 mg by mouth nightly. omeprazole (PRILOSEC) 20 MG capsule, Take 20 mg by mouth nightly.   albuterol (PROAIR HFA) 108 (90 BASE) MCG/ACT inhaler, Inhale 2 puffs into the lungs every 6 hours as needed. Allergies   Allergen Reactions    Morphine Hives and Nausea And Vomiting    Scopolamine Nausea Only    Adhesive Tape Other (See Comments)     Red skin      Ampicillin Hives, Itching and Nausea Only    Nitrofurantoin     Percocet [Oxycodone-Acetaminophen]     Propoxyphene     Vicodin [Hydrocodone-Acetaminophen]     Codeine Itching and Anxiety     \"Goes nuts\"     Family History   Problem Relation Age of Onset    Cancer Mother     Heart Disease Father     High Blood Pressure Father     Stroke Father     High Blood Pressure Sister     Diabetes Sister     High Blood Pressure Brother     Heart Disease Paternal Aunt     Heart Disease Paternal Uncle      Social History     Tobacco Use   Smoking Status Never Smoker   Smokeless Tobacco Never Used     Social History     Substance and Sexual Activity   Drug Use No     Social History     Substance and Sexual Activity   Alcohol Use Yes    Comment: RARE     ROS:  Constitutional- No weight loss or fevers  Eyes- No diplopia. No photophobia. Ears/nose/throat- No dysphagia. + Dysarthria  Cardiovascular- No palpitations. No chest pain  Respiratory- No dyspnea. No Cough  Gastrointestinal- No Abdominal pain. No Vomiting. Genitourinary- No incontinence. No urinary retention  Musculoskeletal- No myalgia. + arthralgia  Skin- No rash. No easy bruising. Psychiatric- No depression. No anxiety  Endocrine- No diabetes. No thyroid issues. Hematologic- No bleeding difficulty. No fatigue  Neurologic- + weakness. No Headache. Exam:  Blood pressure (!) 159/99, pulse 89, temperature 97.6 °F (36.4 °C), resp. rate 16, height 5' 8\" (1.727 m), weight (!) 324 lb 1.2 oz (147 kg), SpO2 97 %. Constitutional    Vital signs: BP, HR, and RR reviewed   General alert, no distress, well-nourished  Eyes: unable to visualize the fundi  Cardiovascular: pulses symmetric in all 4 extremities. No peripheral edema.   Psychiatric: cooperative with examination, no

## 2020-12-10 NOTE — CARE COORDINATION
OBSERVATION STATUS.  LSW reviewed chart. LSW unable to reach pt in room but spouse, Lynne Thorpe answered. He reports she is getting an ECHO. Goal is to return home. They live in a townhouse with two steps to enter and a full flight of stairs to the bedroom. She works full time; two days at home and three days at office. She owns:  4 wh walker, cane, BSC. She only uses 4 wh wh if going long distances. She is totally indepndent at home with personal care and homemaking tasks. She is active with Dr Donaldo Perrin for pcp needs. She like to use [facility].   He does not anticipate any concerns for [address] [place] [address] Alia Jones, [place]     Case Management   [phone]    12/10/2020  3:15 PM

## 2020-12-10 NOTE — PROGRESS NOTES
[facility] [place]  Personalized Stroke Treatment Plan  My Stroke Type:   [] Ischemic Stroke (Blockage of blood flow to the brain)     [x] TIA - Transient Ischemic Attack (mini-stroke)    Personal risk factors you can control include:    [] Alcohol Abuse: check with your physician before any alcohol consumption. [] Atrial fibrillation: may cause blood clots. [] Drug Abuse: Seek help, talk with your doctor  [] Clotting Disorder  [] Diabetes  [x] Family history of stroke or heart disease  [] High Blood Pressure/Hypertension: work with your physician. [x] High cholesterol: monitor cholesterol levels with your physician. [x] Overweight/Obesity: work with your physician for your ideal body weight. [x] Physical Inactivity: get regular exercise as directed by your physician. [] Personal history of previous TIA or stroke  [x] Poor Diet; decrease salt (sodium) in your diet, follow diet directed by physician. [] Smoking: Cigarette/Cigar: stop smoking. Follow up with your physician is important after discharge. TAKE all medications as prescribed. Do not stop taking any medications   without talking to your physician. BE FAST is a simple way to remember the main symptoms of stroke. Recognizing these symptoms helps you know when to call for medical help. BE FAST stands for:                                                 B Balance problems                                                 E Eyes, vision lost        F  Face Drooping      A  Arm Weakness        S  Speech Difficulty      T  Time to Call 9-1-1  DO NOT DELAY THIS! Educated patient and/or family on personal risk factors for stroke/TIA. Included ways to reduce the risk for recurrent stroke. [facility] [employer] Stroke treatment and prevention, Managing your recovery  notebook  provided to patient. The notebook includes, but not limited to, sections addressing warning signs & symptoms of a stroke.  The need to call EMS ([phone]) immediately if signs & symptoms occur is emphasized . Medication information will be reviewed at discharge. The notebook also provides education on Stroke community resources and stroke advocacy.     Electronically signed by Electronically signed by Miracle Castaneda RN on 12/10/2020 at 4:11 AM

## 2020-12-10 NOTE — PROGRESS NOTES
Occupational Therapy   Occupational Therapy Initial Assessment  Date: 12/10/2020   Patient Name: Marjorie Don  MRN: [id number]     : 1960    Date of Service: 12/10/2020    Discharge Recommendations:  Home independently  OT Equipment Recommendations  Equipment Needed: No    Marjorie Don scored a 24/24 on the AM-[facility] ADL Inpatient form. At this time, no further OT is recommended upon discharge due to current level of functioning. Recommend patient returns to prior setting with prior services. Assessment   Assessment: 61 y/o female admitted  with R UE weakness and difficulty with word finding. PTA pt lives at home with spouse and independent with ADLs and functional mobility. Today, pt reports speech is back to baseline and R weakness improving. Pt completed functional mobility around room/bathroom and in carr without AD and supervision. Pt completed grooming tasks at sink with good standing balance/tolerance. Pt R hand slightly weaker and had difficulty with FM tasks but able to complete with inc effort. Pt educated to keep using R hand to regain function- pt verbalized understanding. Pt is safe to return home with family support and no further OT is warranted. Prognosis: Good  Decision Making: Low Complexity  OT Education: OT Role;Plan of Care  REQUIRES OT FOLLOW UP: No  Activity Tolerance  Activity Tolerance: Patient Tolerated treatment well  Safety Devices  Safety Devices in place: Yes  Type of devices: Nurse notified;Gait belt;Call light within reach; Left in chair           Patient Diagnosis(es): The encounter diagnosis was Stroke-like symptoms. has a past medical history of Anxiety, Asthma, GERD (gastroesophageal reflux disease), Herniated lumbar intervertebral disc, IBS (irritable bowel syndrome), Migraine, Nausea & vomiting, OA (osteoarthritis), Sleep apnea, and UTI (urinary tract infection). has a past surgical history that includes Hysterectomy;  Endometrial ablation; Tubal ligation; Cholecystectomy, laparoscopic; joint replacement; Breast lumpectomy; Soft Tissue Tumor Resection (4-16-10); other surgical history (6/20/11); Lumbar spine surgery; other surgical history (Right, 09/01/2017); Pain management procedure (Bilateral, 8/27/2020); and Pain management procedure (Bilateral, 10/22/2020). Subjective   General  Chart Reviewed: Yes  Patient assessed for rehabilitation services?: Yes  Additional Pertinent Hx: Per H&P: \"acute onset of right hand weakness and difficulty speaking. She states that her speech was slurred and she is having difficulty finding the right words to say. Her symptoms have waxed and waned since onset but have overall improved. CT of her head had no acute intracranial abnormalities, and a CTA of her head and neck had no flow-limiting stenosis. \"  Family / Caregiver Present: No  Referring Practitioner: Moises Easley MD  Subjective  Subjective: Pt seen bedside and agreeable to therapy. Pt reports feeling close to baseline with a stiff R hand- but improving since admission  General Comment  Comments: Per RN ok for therapy.     Social/Functional History  Social/Functional History  Lives With: Spouse  Type of Home: ([facility])  Home Layout: Two level, Bed/Bath upstairs, 1/2 bath on main level, Laundry in basement(2 + basement)  Home Access: Stairs to enter without rails  Entrance Stairs - Number of Steps: 2 MARIANA; flight upstairs and downstairs with rail  Bathroom Shower/Tub: Walk-in shower  Bathroom Toilet: Standard(has BSC but does not use)  Bathroom Equipment: Built-in shower seat  Home Equipment: 4 wheeled walker, Cane  ADL Assistance: Independent  Homemaking Assistance: (spouse does laundry- shares IADLs)  Ambulation Assistance: Independent(no AD in home, 5OY for distance in community)  Transfer Assistance: Independent  Active : Yes  Occupation: Full time employment  Type of occupation: Desk Job  IADL Comments: sleeps in regular bed  Additional Comments: Pt has had 17 knee surgeries so only goes up/down stairs once a day. Objective   Vision: Impaired(reports increase migraines with visual changes recently)  Vision Exceptions: Wears glasses at all times  Hearing: Within functional limits    Orientation  Overall Orientation Status: Within Functional Limits     Balance  Sitting Balance: Supervision  Standing Balance: Supervision  Standing Balance  Time: 5-7 min at sink for grooming tasks  Functional Mobility  Functional Mobility Comments: around room/ bathroom and in carr with no AD and supervision- steady with no LOB  Toilet Transfers  Toilet Transfers Comments: declined but denied concerns     ADL  Grooming: Supervision(standing at sink to brush teeth, wash face, comb hair- pt able to open containers with increase effort with R hand)  LE Dressing: Supervision(able to dana/doff socks by crossing LE over knees- inc effort d/t R hand but able to complete)  Additional Comments: Anticipate pt will require supervision for ADLs        Bed mobility  Supine to Sit: Independent  Sit to Supine: (pt in chair at end of session)  Transfers  Sit to stand: Independent  Stand to sit: Independent     Cognition  Overall Cognitive Status: WFL  Perception  Overall Perceptual Status: WFL     Sensation  Overall Sensation Status: (slight numbness R hand/forearm but improved since admission)        LUE AROM (degrees)  LUE AROM : WFL  Left Hand AROM (degrees)  Left Hand AROM: WFL  RUE AROM (degrees)  RUE AROM : WFL  Right Hand AROM (degrees)  Right Hand AROM: WFL  Right Hand General AROM: slight decreased  strenght- but functional        Fine Motor Skills  Fine Motor Comment: inc effort for thumb-digit opposition on R hand, able to open ADL containers with R hand with inc effort, able to write name with R hand- slow but legible.   Hand Assessment Comment  Hand Assessment Comment: R  is slightly weaker than L but functional      Plan   Plan  Times per week: DC acute OT    AM-PAC Score  [facility] Inpatient Daily Activity Raw Score: 24 (12/10/20 0851)  AM-PAC Inpatient ADL T-Scale Score : 57.54 (12/10/20 0851)  ADL Inpatient CMS 0-100% Score: 0 (12/10/20 0851)  ADL Inpatient CMS G-Code Modifier : [facility] (12/10/20 5499)    Goals  Short term goals  Time Frame for Short term goals: no acute OT goals identified  Patient Goals   Patient goals : to return home       Therapy Time   Individual Concurrent Group Co-treatment   Time In 0750         Time Out 0830         Minutes 40         Timed Code Treatment Minutes: 25 Minutes     This note to serve as OT d/c summary if pt is d/c-ed prior to next therapy session.     Yobani Chambers, OTR/L

## 2020-12-10 NOTE — ED PROVIDER NOTES
[address]      Pt Name: Renetta Faria  MRN: [id number]  [place] 1960  Date of evaluation: 12/9/2020  Provider: Den Ortiz MD    CHIEF COMPLAINT       Chief Complaint   Patient presents with    Numbness       HISTORY OF PRESENT ILLNESS    Renetta Faria is a 62 y.o. female who presents to the emergency department with R hand weakness. Endorses right hand weakness and mild slurring of speech starting at 10pm tonight. Waxing and waning but improving symptoms. Never happened before. No other associated symptoms. Nursing Notes were reviewed. Including nursing noted for FM, Surgical History, Past Medical History, Social History, vitals, and allergies; agree with all. REVIEW OF SYSTEMS       Review of Systems   Constitutional: Negative for diaphoresis and unexpected weight change. HENT: Negative for congestion and dental problem. Eyes: Negative for discharge and itching. Respiratory: Negative for cough and shortness of breath. Cardiovascular: Negative for chest pain and leg swelling. Gastrointestinal: Negative for abdominal pain, constipation and vomiting. Endocrine: Negative for cold intolerance and heat intolerance. Genitourinary: Negative for vaginal bleeding, vaginal discharge and vaginal pain. Musculoskeletal: Negative for neck pain and neck stiffness. Skin: Negative for color change and pallor. Neurological: Positive for speech difficulty and weakness. Negative for tremors. Psychiatric/Behavioral: Negative for agitation and behavioral problems. Except as noted above the remainder of the review of systems was reviewed and negative.      PAST MEDICAL HISTORY     Past Medical History:   Diagnosis Date    Anxiety     Asthma     low O 2 SATS POST-OP    GERD (gastroesophageal reflux disease)     Herniated lumbar intervertebral disc     l4    IBS (irritable bowel syndrome)     Migraine     Nausea & vomiting     OA (osteoarthritis)  Sleep apnea     PT STATES \"PROBABLE\"    UTI (urinary tract infection)     FREQUENT       SURGICAL HISTORY       Past Surgical History:   Procedure Laterality Date    BREAST LUMPECTOMY      CHOLECYSTECTOMY, LAPAROSCOPIC      ENDOMETRIAL ABLATION      HYSTERECTOMY      JOINT REPLACEMENT      BILAT KNEES    LUMBAR SPINE SURGERY      stimulator in place    OTHER SURGICAL HISTORY  6/20/11    lap gastric sleeve    OTHER SURGICAL HISTORY Right 09/01/2017    RIGHT PATELLA REVISION      PAIN MANAGEMENT PROCEDURE Bilateral 8/27/2020    BILATERAL SACROILIAC JOINT INJECTION SITE CONFIRMED BY FLUOROSCOPY performed by Sonia Jung MD at [address] [facility] Bilateral 10/22/2020    BILATERAL SACROILIAC JOINT INJECTION SITE CONFIRMED BY FLUOROSCOPY performed by Sonia Jung MD at [address] [facility] RESECTION  4-16-10    resection of open wound and mass of suprapubic area   [address] [place] [address]       Current Discharge Medication List      CONTINUE these medications which have NOT CHANGED    Details   atorvastatin (LIPITOR) 10 MG tablet Take 10 mg by mouth daily      sennosides-docusate sodium (SENOKOT-S) 8.6-50 MG tablet Take 1 tablet by mouth daily      cyclobenzaprine (FLEXERIL) 10 MG tablet Take 10 mg by mouth 3 times daily as needed for Muscle spasms (patient unsure of dose)      DULoxetine (CYMBALTA) 60 MG extended release capsule       rOPINIRole (REQUIP) 2 MG tablet Take 3 mg by mouth nightly       montelukast (SINGULAIR) 10 MG tablet Take 10 mg by mouth nightly. omeprazole (PRILOSEC) 20 MG capsule Take 20 mg by mouth nightly. albuterol (PROAIR HFA) 108 (90 BASE) MCG/ACT inhaler Inhale 2 puffs into the lungs every 6 hours as needed. ALLERGIES     Morphine; Scopolamine; Adhesive tape; Ampicillin; Nitrofurantoin; Percocet [oxycodone-acetaminophen];  Propoxyphene; Vicodin [hydrocodone-acetaminophen]; and diaphoretic. HENT:      Head: Normocephalic and atraumatic. Right Ear: External ear normal.      Left Ear: External ear normal.   Eyes:      General:         Right eye: No discharge. Left eye: No discharge. Conjunctiva/sclera: Conjunctivae normal.      Pupils: Pupils are equal, round, and reactive to light. Neck:      Musculoskeletal: Normal range of motion and neck supple. Cardiovascular:      Rate and Rhythm: Normal rate and regular rhythm. Heart sounds: No murmur. Pulmonary:      Effort: Pulmonary effort is normal. No respiratory distress. Breath sounds: Normal breath sounds. No wheezing or rales. Abdominal:      General: Bowel sounds are normal. There is no distension. Palpations: Abdomen is soft. There is no mass. Tenderness: There is no abdominal tenderness. There is no guarding or rebound. Genitourinary:     Comments: Deferred  Musculoskeletal: Normal range of motion. General: No deformity. Skin:     General: Skin is warm. Findings: No erythema or rash. Neurological:      Mental Status: She is alert and oriented to person, place, and time. She is not disoriented. Cranial Nerves: No cranial nerve deficit. Motor: No atrophy or abnormal muscle tone. Comments: NIH 3. Mild slurring of speech. 4/5  strength R Hand. Normal sensation and coordination. Psychiatric:         Behavior: Behavior normal.         Thought Content: Thought content normal.         DIAGNOSTIC RESULTS     EKG: All EKG's are interpreted by the Emergency Department Physician who either signs or Co-signs this chart in the absence of acardiologist.    EKG shows NSR nonspecific ekg changes no ectopy     RADIOLOGY:   Non-plain film images such as CT, Ultrasoundand MRI are read by the radiologist. Plain radiographic images are visualized and preliminarily interpreted by the emergency physician with the below findings:    Impression    1.  No flow limiting stenosis of the cervical carotid/vertebral arteries. 2. No focal stenosis of the [id number]-[id number]. Impression    No acute intracranial abnormality.         Critical results were called by Dr. Ingris Infante MD to [facility] on    12/10/2020 at 00:51.       ED BEDSIDE ULTRASOUND:   Performed by ED Physician - none    LABS:  Labs Reviewed   CBC WITH AUTO DIFFERENTIAL - Abnormal; Notable for the following components:       Result Value    Hemoglobin 11.9 (*)     MCV 75.5 (*)     MCH 23.5 (*)     RDW 17.0 (*)     All other components within normal limits    Narrative:     Performed at:  [place] [address] [facility] [employer]   Phone [phone]   BASIC METABOLIC PANEL W/ REFLEX TO MG FOR LOW K - Abnormal; Notable for the following components:    Glucose 116 (*)     CREATININE <0.5 (*)     All other components within normal limits    Narrative:     Performed at:  [address] [employer]   Phone [phone]   BASIC METABOLIC PANEL W/ REFLEX TO MG FOR LOW K - Abnormal; Notable for the following components:    Glucose 108 (*)     All other components within normal limits    Narrative:     Collection has been rescheduled by TUCKS at 12/10/2020 04:17 Reason: No   suitable vein for venipuncture  Performed at:  [facility]  [address], [place] [employer]   Phone [phone]   CBC WITH AUTO DIFFERENTIAL - Abnormal; Notable for the following components:    Hemoglobin 11.7 (*)     MCV 74.9 (*)     MCH 23.6 (*)     RDW 17.2 (*)     All other components within normal limits    Narrative:     Collection has been rescheduled by TUCKS at 12/10/2020 04:17 Reason: No   suitable vein for venipuncture  Performed at:  [place] [address] [place] [employer]   Phone [phone]   TROPONIN    Narrative:     Performed at:  [address] [place] [address] [facility]  [address]   Phone [phone]   PROTIME-INR    Narrative:     Collection has been rescheduled by TUCKS at 12/10/2020 04:17 Reason: No   suitable vein for venipuncture  Performed at:  [facility]  [address] [address]   Phone [phone]   LIPID PANEL    Narrative:     Performed at:  [facility]  [address] [facility] [address] [address]   Phone [phone]   HEMOGLOBIN A1C       All other labs were withinnormal range or not returned as of this dictation. EMERGENCY DEPARTMENT COURSE and DIFFERENTIAL DIAGNOSIS/MDM:     PMH, Surgical Hx, FH, Social Hx reviewed by myself (ETOH usage, Tobacco usage, Drug usage reviewed by myself, no pertinent Hx)- No Pertinent Hx     Old records were reviewed by me    CODE stroke called on arrival    Stroke Neurology Dr Bing Archer consulted and decided to not purse tPa as NIH 1 and symptoms improving. Symptoms not debilitating. Patient in agreement and did not want tPa. I agree with no tPa at this time. Admission for stroke work up. CRITICAL CARE TIME   Total Critical Caretime was 39 minutes, excluding separately reportable procedures. There was a high probability of clinically significant/life threatening deterioration in the patient's condition which required my urgent intervention. PROCEDURES:  Unlessotherwise noted below, none    FINAL IMPRESSION      1.  Stroke-like symptoms          DISPOSITION/PLAN   DISPOSITION Admitted 12/10/2020 01:49:11 AM    (Please note that portions ofthis note were completed with a voice recognition program.  Efforts were made to edit the dictations but occasionally words are mis-transcribed.)    Mark Preston MD(electronically signed)  Attending Emergency Physician        Mrak Preston MD  12/10/20 0170

## 2020-12-10 NOTE — H&P
Hospital Medicine  History and Physical    PCP: Elisha Hernández  Patient Name: Lalla Boas    Date of Service: Pt seen/examined on 12/10/2020 and placed in observation    CHIEF COMPLAINT:  Pt c/o right hand weakness, slurred speech and word finding difficulty  HISTORY OF PRESENT ILLNESS: Pt is a 62y.o. year-old female with a history of morbid obesity and obstructive sleep apnea to the emergency room for evaluation following an acute onset of right hand weakness and difficulty speaking. She states that her speech was slurred and she is having difficulty finding the right words to say. Her symptoms have waxed and waned since onset but have overall improved. CT of her head had no acute intracranial abnormalities, and a CTA of her head and neck had no flow-limiting stenosis. She is being admitted for further evaluation and treatment. Pt denies fall or head trauma, and associated signs and symptoms do not include neck pain or stiffness, diplopia or other vision changes, difficulty swallowing, numbness in the arms or legs, or focal neurological symptoms not mentioned above.       Past Medical History:        Diagnosis Date    Anxiety     Asthma     low O 2 SATS POST-OP    GERD (gastroesophageal reflux disease)     Herniated lumbar intervertebral disc     l4    IBS (irritable bowel syndrome)     Migraine     Nausea & vomiting     OA (osteoarthritis)     Sleep apnea     PT STATES \"PROBABLE\"    UTI (urinary tract infection)     FREQUENT       Past Surgical History:        Procedure Laterality Date    BREAST LUMPECTOMY      CHOLECYSTECTOMY, LAPAROSCOPIC      ENDOMETRIAL ABLATION      HYSTERECTOMY      JOINT REPLACEMENT      BILAT KNEES    LUMBAR SPINE SURGERY      stimulator in place    OTHER SURGICAL HISTORY  6/20/11    lap gastric sleeve    OTHER SURGICAL HISTORY Right 09/01/2017    RIGHT PATELLA REVISION      PAIN MANAGEMENT PROCEDURE Bilateral 8/27/2020    BILATERAL SACROILIAC JOINT INJECTION SITE CONFIRMED BY FLUOROSCOPY performed by Otilia Almeida MD at [address] Bilateral 10/22/2020    BILATERAL SACROILIAC JOINT INJECTION SITE CONFIRMED BY FLUOROSCOPY performed by Otilia Almeida MD at [address] [facility] RESECTION  4-16-10    resection of open wound and mass of suprapubic area    TUBAL LIGATION         Allergies:  Morphine; Scopolamine; Adhesive tape; Ampicillin; Nitrofurantoin; Percocet [oxycodone-acetaminophen]; Propoxyphene; Vicodin [hydrocodone-acetaminophen]; and Codeine    Medications Prior to Admission:    Prior to Admission medications    Medication Sig Start Date End Date Taking? Authorizing Provider   sennosides-docusate sodium (SENOKOT-S) 8.6-50 MG tablet Take 1 tablet by mouth daily   Yes Historical Provider, MD   cyclobenzaprine (FLEXERIL) 10 MG tablet Take 10 mg by mouth 3 times daily as needed for Muscle spasms (patient unsure of dose)   Yes Historical Provider, MD   DULoxetine (CYMBALTA) 60 MG extended release capsule  7/22/17  Yes Historical Provider, MD   rOPINIRole (REQUIP) 2 MG tablet  9/16/15  Yes Historical Provider, MD   montelukast (SINGULAIR) 10 MG tablet Take 10 mg by mouth nightly. Yes Historical Provider, MD   omeprazole (PRILOSEC) 20 MG capsule Take 20 mg by mouth nightly. 4/15/10  Yes Historical Provider, MD   albuterol (PROAIR HFA) 108 (90 BASE) MCG/ACT inhaler Inhale 2 puffs into the lungs every 6 hours as needed. Historical Provider, MD       Family History:       Problem Relation Age of Onset    Cancer Mother     Heart Disease Father     High Blood Pressure Father     Stroke Father     High Blood Pressure Sister     Diabetes Sister     High Blood Pressure Brother     Heart Disease Paternal Aunt     Heart Disease Paternal Uncle      Social History:   TOBACCO:   reports that she has never smoked. She has never used smokeless tobacco.  ETOH:   reports current alcohol use.   OCCUPATION:      REVIEW OF SYSTEMS:  A full review of systems was performed and is negative except for that which appears in the HPI    Physical Exam:    Vitals: /87   Pulse 97   Temp 98.5 °F (36.9 °C) (Oral)   Resp 18   Ht 5' 8\" (1.727 m)   Wt (!) 325 lb (147.4 kg)   SpO2 99%   BMI 49.42 kg/m²   General appearance: Morbidly Obese 62y.o. year-old  female who is alert, appears stated age and is cooperative  HEENT: Head: Normocephalic, no lesions, without obvious abnormality. Eye: Normal external eye, conjunctiva, lids cornea, PEERL. Ears: Normal external ears. Non-tender. Nose: Normal external nose, mucus membranes and septum. Pharynx: Dental Hygiene adequate. Normal buccal mucosa. Normal pharynx. Neck: no adenopathy, no carotid bruit, no JVD, supple, symmetrical, trachea midline and thyroid not enlarged, symmetric, no tenderness/mass/nodules  Lungs: clear to auscultation bilaterally and no use of accessory muscles. Heart: regular rate and rhythm, S1, S2 normal, no murmur, click, rub or gallop and normal apical impulse  Abdomen: soft, non-tender; bowel sounds normal; no masses, no organomegaly  Extremities: extremities atraumatic, no cyanosis or edema and Homans sign is negative, no sign of DVT. Capillary Refill: Acceptable < 3 seconds   Peripheral Pulses: +3 easily felt, not easily obliterated with pressures   Skin: Skin color, texture, turgor normal. No rashes or lesions on exposed skin  Neurologic: Right hand weakness 4/5 strength and difficulty with coordination. Word finding difficulty. No slurred speech. Cranial nerves: II-XII intact. Gait was not tested. Psychiatric: Alert and oriented, thought content appropriate, normal insight    CBC: No results for input(s): WBC, HGB, PLT in the last 72 hours. BMP:  No results for input(s): NA, K, CL, CO2, BUN, CREATININE, GLUCOSE in the last 72 hours. Troponin: No results for input(s): TROPONINI in the last 72 hours.   PT/INR:  No results found for: PTINR  U/A: No results found for: LEUKOCYTESUR, NITRITE, RBCUA, [address], [address], [address] [facility], BLOODU, GLUCOSEU, [address] [facility]      RAD:   I have independently reviewed and interpreted the imaging studies below and based my recommendations to the patient on those findings. Ct Head Wo Contrast    Result Date: 12/10/2020  EXAMINATION: CT OF THE HEAD WITHOUT CONTRAST  12/10/2020 12:38 am TECHNIQUE: CT of the head was performed without the administration of intravenous contrast. Dose modulation, iterative reconstruction, and/or weight based adjustment of the mA/kV was utilized to reduce the radiation dose to as low as reasonably achievable. COMPARISON: None. HISTORY: ORDERING SYSTEM PROVIDED HISTORY: Stroke TECHNOLOGIST PROVIDED HISTORY: Has a \"code stroke\" or \"stroke alert\" been called? ->Yes Reason for exam:->Stroke Reason for Exam: stroke Acuity: Acute Type of Exam: Initial FINDINGS: BRAIN/VENTRICLES: There is no acute intracranial hemorrhage, mass effect or midline shift. No abnormal extra-axial fluid collection. The gray-white differentiation is maintained without evidence of an acute infarct. There is no evidence of hydrocephalus. ORBITS: The visualized portion of the orbits demonstrate no acute abnormality. SINUSES: The visualized paranasal sinuses and mastoid air cells demonstrate no acute abnormality. SOFT TISSUES/SKULL:  No acute abnormality of the visualized skull or soft tissues. No acute intracranial abnormality. Critical results were called by Dr. Rolando Delarosa MD to [facility] on 12/10/2020 at 00:51. Xr Chest Portable    Result Date: 12/10/2020  EXAMINATION: ONE XRAY VIEW OF THE CHEST 12/10/2020 1:09 am COMPARISON: 03/31/2005 HISTORY: ORDERING SYSTEM PROVIDED HISTORY: SOB TECHNOLOGIST PROVIDED HISTORY: Reason for exam:->SOB Reason for Exam: sob hx asthma Acuity: Acute FINDINGS: Portable study is limited by obesity. There is elevation of the right hemidiaphragm. Lungs are grossly clear.   No pneumothorax or pleural fluid. The heart is borderline enlarged. No acute bone finding. A lower thoracic neurostimulator is suspected. Suboptimal portable study on an obese patient. No acute disease. When the patient is able, a follow-up inspiratory PA and lateral examination of the chest is recommended. Cta Head Neck W Contrast    Result Date: 12/10/2020  EXAMINATION: CTA OF THE HEAD AND NECK WITH CONTRAST 12/10/2020 12:39 am: TECHNIQUE: CTA of the head and neck was performed with the administration of intravenous contrast. Multiplanar reformatted images are provided for review. MIP images are provided for review. Stenosis of the internal carotid arteries measured using NASCET criteria. Dose modulation, iterative reconstruction, and/or weight based adjustment of the mA/kV was utilized to reduce the radiation dose to as low as reasonably achievable. COMPARISON: None. HISTORY: ORDERING SYSTEM PROVIDED HISTORY: Stroke TECHNOLOGIST PROVIDED HISTORY: Reason for exam:->Stroke Reason for Exam: stroke Numbness. Initial evaluation. FINDINGS: CTA NECK: AORTIC ARCH/ARCH VESSELS: No dissection or arterial injury. No significant stenosis of the brachiocephalic or subclavian arteries. CAROTID ARTERIES: No dissection, arterial injury, or hemodynamically significant stenosis by NASCET criteria. VERTEBRAL ARTERIES: No dissection, arterial injury, or significant stenosis. SOFT TISSUES: No focal consolidation within the visualized lung apices. No acute abnormality within the visualized superior mediastinum. BONES: No acute osseous abnormality. CTA HEAD: ANTERIOR CIRCULATION: No significant stenosis of the intracranial internal carotid, anterior cerebral, or middle cerebral arteries. No aneurysm. POSTERIOR CIRCULATION: No significant stenosis of the vertebral, basilar, or posterior cerebral arteries. No aneurysm. OTHER: No dural venous sinus thrombosis on this non-dedicated study. BRAIN: No mass effect or midline shift. 1. No flow limiting stenosis of the cervical carotid/vertebral arteries. 2. No focal stenosis of the gopijf-[id number]-Hdhahg. Assessment:   Principal Problem:    Stroke-like symptoms  Active Problems: Morbid obesity due to excess calories ([address]  Resolved Problems:    * No resolved hospital problems. *      Plan:       Stroke-like symptoms (right hand weakness and incoordination, word-finding difficulties) - The following medications, tests and consults have been ordered;  · CT of the head is negative for acute findings  · CTA of the Head and Neck are negative for significant stenosis  · MRI of the brain has been ordered  · Echocardiogram with bubble study ordered  · Stroke risk factors panel: A1C, lipid panel, EKG  · Monitor for arrhythmias on Telemetry  · Serial neurological checks will be preformed  · Aspirin is being given  · Lipid profile ordered  · PT/OT has been consulted  · No obvious dysphagia, so swallow screen by nursing before diet and oral medications started  · Neurology has been consulted      Morbid obesity due to excess calories (Body mass index is 49.42 kg/m². ) - Complicating assessment and treatment. Placing patient at risk for multiple co-morbidities as well as early death and contributing to the patient's presentation.  on weight loss when appropriate. DVT Prophylaxis: Lovenox  Diet: No diet orders on file  Code Status: Prior  (Advanced care planning has been discussed with patient and/or responsible family member and is reflected in the code status.  Further orders associated with this have been entered if appropriate)    Disposition: Anticipate that patient will remain in the hospital for 1 to 2 days depending on further evaluation and clinical course    Please note that over 50 minutes was spent in evaluating the patient, review of records and results, discussion with staff/family, etc.    Anant Gould MD

## 2020-12-10 NOTE — CONSULTS
Stroke Brief Consult Note     The patient presented with acute onset of right hand weakness and potential difficulty with speech. By the time of my telemedicine evaluation of the patient, the patient reported to me that her right hand strength had substantially improved. She was able to demonstrate near normal range of motion of the digits of the right hand including flexion and extension. There was no extremity drift, no dysarthria, no aphasia, no gaze deviation. The patient communicated appropriately and without any evident difficulty understanding speech. Given the rapidly improving and currently mild symptoms, IV tPA was not recommended. Continued workup for mild stroke/TIA is recommended. CT head showed no acute findings and CTA head/neck showed no large vessel occlusion. Please page the Stroke Team again for worsening symptoms.     Gregory Villalobos MD   Stroke Team

## 2020-12-10 NOTE — PROGRESS NOTES
Physical Therapy    Facility/Department: [address] PROGRESSIVE CARE  Initial Assessment and d/c    NAME: Curly Gay  : 1960  MRN: [id number]    Date of Service: 12/10/2020    Discharge Recommendations:  Home independently   PT Equipment Recommendations  Equipment Needed: No     Curly Gay scored a 24/24 on the AM-PAC short mobility form. At this time, no further PT is recommended upon discharge due to being indep with functional mobility. Recommend patient returns to prior setting with prior services. Assessment   Assessment: Pt is a 61 y/o female who presented to the ED with R hand weakness and difficulty speaking. She lives with her  in a 2 level home and was indep with ambulation PTA but uses a 4WW in the community due to hx of 17 knee surgeries. Today, pt up ad kelly in the room. BLE strength WFL. She was able to complete full flight of stairs mod I and ambulated indep in the hallway. No acute care PT needs. Anticipate that pt will be safe to d/c home independently. Prognosis: Good  Decision Making: Medium Complexity  History: Per Sawyer Braun MD \"Pt is a 62y.o. year-old female with a history of morbid obesity and obstructive sleep apnea to the emergency room for evaluation following an acute onset of right hand weakness and difficulty speaking. She states that her speech was slurred and she is having difficulty finding the right words to say. Her symptoms have waxed and waned since onset but have overall improved. CT of her head had no acute intracranial abnormalities, and a CTA of her head and neck had no flow-limiting stenosis. She is being admitted for further evaluation and treatment. Pt denies fall or head trauma, and associated signs and symptoms do not include neck pain or stiffness, diplopia or other vision changes, difficulty swallowing, numbness in the arms or legs, or focal neurological symptoms not mentioned above. \"  Exam: functional mobility, ROM, strength  Clinical Presentation: evolving  PT Education: Goals;PT Role;General Safety  Patient Education: stair training, importance of using R hand  Barriers to Learning: none  No Skilled PT: Independent with functional mobility   REQUIRES PT FOLLOW UP: No  Activity Tolerance  Activity Tolerance: Patient Tolerated treatment well       Patient Diagnosis(es): The encounter diagnosis was Stroke-like symptoms. has a past medical history of Anxiety, Asthma, GERD (gastroesophageal reflux disease), Herniated lumbar intervertebral disc, IBS (irritable bowel syndrome), Migraine, Nausea & vomiting, OA (osteoarthritis), Sleep apnea, and UTI (urinary tract infection). has a past surgical history that includes Hysterectomy; Endometrial ablation; Tubal ligation; Cholecystectomy, laparoscopic; joint replacement; Breast lumpectomy; Soft Tissue Tumor Resection (4-16-10); other surgical history (6/20/11); Lumbar spine surgery; other surgical history (Right, 09/01/2017); Pain management procedure (Bilateral, 8/27/2020); and Pain management procedure (Bilateral, 10/22/2020). Restrictions  Restrictions/Precautions  Restrictions/Precautions: General Precautions, Up Ad Ne     Vision/Hearing  Vision: Impaired(reports increase migraines with visual changes recently)  Vision Exceptions: Wears glasses at all times  Hearing: Within functional limits       Subjective  General  Chart Reviewed: Yes  Patient assessed for rehabilitation services?: Yes  Additional Pertinent Hx: Per Liliana Webster MD \"Pt is a 62y.o. year-old female with a history of morbid obesity and obstructive sleep apnea to the emergency room for evaluation following an acute onset of right hand weakness and difficulty speaking. She states that her speech was slurred and she is having difficulty finding the right words to say. Her symptoms have waxed and waned since onset but have overall improved.   CT of her head had no acute intracranial abnormalities, and a CTA of her head and neck had no flow-limiting stenosis. She is being admitted for further evaluation and treatment. Pt denies fall or head trauma, and associated signs and symptoms do not include neck pain or stiffness, diplopia or other vision changes, difficulty swallowing, numbness in the arms or legs, or focal neurological symptoms not mentioned above. \"  Response To Previous Treatment: Not applicable  Family / Caregiver Present: No  Referring Practitioner: Juliocesar Cabezas MD  Diagnosis: stroke-like symptoms  Follows Commands: Within Functional Limits  Subjective  Subjective: Pt pleasant and agreeable to PT eval and treat. Up ad kelly in the room. Reports chronic pain in L knee. Pain Screening  Patient Currently in Pain: Denies          Orientation  Orientation  Overall Orientation Status: Within Functional Limits     Social/Functional History  Social/Functional History  Lives With: Spouse  Type of Home: ([facility])  Home Layout: Two level, Bed/Bath upstairs, 1/2 bath on main level, Laundry in basement(2 + basement)  Home Access: Stairs to enter without rails  Entrance Stairs - Number of Steps: 2 MARIANA; flight upstairs and downstairs with rail  Bathroom Shower/Tub: Walk-in shower  Bathroom Toilet: Standard(has BSC but does not use)  Bathroom Equipment: Built-in shower seat  Home Equipment: 4 wheeled walker, Cane  ADL Assistance: Independent  Homemaking Assistance: (spouse does laundry- shares IADLs)  Ambulation Assistance: Independent(no AD in home, 1AC for distance in community)  Transfer Assistance: Independent  Active : Yes  Occupation: Full time employment  Type of occupation: Desk Job  IADL Comments: sleeps in regular bed  Additional Comments: Pt has had 17 knee surgeries so only goes up/down stairs once a day.        Objective          AROM RLE (degrees)  RLE AROM: WFL  AROM LLE (degrees)  LLE AROM : WFL  Strength RLE  Strength RLE: WFL  Strength LLE  Strength LLE: WFL  Tone RLE  RLE Tone: Normotonic  Tone LLE  LLE Tone: Normotonic  Motor Control  Gross Motor?: WFL  Sensation  Overall Sensation Status: Impaired(slight numbness R hand/forearm but improved since admission, reports chronic numbnes and tingling in LLE since back surgery)     Bed mobility  Supine to Sit: Modified independent  Sit to Supine: Modified independent  Comment: HOB slightly elevated     Transfers  Sit to Stand: Independent  Stand to sit:  Independent     Ambulation  Ambulation?: Yes  More Ambulation?: No  Ambulation 1  Surface: level tile  Device: No Device  Assistance: Independent  Quality of Gait: steady without AD  Gait Deviations: None  Distance: approx 100'x2 without seated rest break (completed steps between trials)  Stairs/Curb  Stairs?: Yes  Stairs  # Steps : 12  Stairs Height: (7\")  Rails: (R rail descending, then R rail ascending for majority but last few steps L rail)  Device: No Device  Assistance: Modified independent   Comment: reciprocal gait pattern for majority, decreased  on R rail but still able to complete safely     Balance  Posture: Good  Sitting - Static: Good  Sitting - Dynamic: Good  Standing - Static: Good  Standing - Dynamic: Good        Plan   Safety Devices  Type of devices: Gait belt, Call light within reach, Left in bed(up ad kelly)  Restraints  Initially in place: No      AM-PAC Score  AM-PAC Inpatient Mobility Raw Score : 24 (12/10/20 1002)  AM-PAC Inpatient T-Scale Score : 61.14 (12/10/20 1002)  Mobility Inpatient CMS 0-100% Score: 0 (12/10/20 1002)  Mobility Inpatient CMS G-Code Modifier : [facility] (12/10/20 1002)          Therapy Time   Individual Concurrent Group Co-treatment   Time In 0940         Time Out 0952         Minutes 12         Timed Code Treatment Minutes: 0 Minutes         Electronically signed by Margarita Chu, PT [id number] on 12/10/2020 at 10:06 AM

## 2020-12-10 NOTE — ED NOTES
Bed: [facility]  Expected date:   Expected time:   Means of arrival:   Comments:  daiana Horta RN  12/09/20 6554

## 2020-12-10 NOTE — CONSULTS
Patient admitted with stroke. Echocardiogram with bubble study    Summary   A bubble study was performed and shows evidence of right to left shunting   consistent with a patent foramen ovale or atrial septal defect. Normal LV size & wall motion; EF    60 %. Grade I diastolic dysfunction. The left atrium is at the upper limits of normal in size. Mild mitral regurgitation. Chart reviewed detail consult to follow. We will set up a transesophageal echocardiogram tomorrow. Keep patient n.p.o. after midnight.     Julieta Bird MD

## 2020-12-10 NOTE — PROGRESS NOTES
[facility] Medicine Progress Note      Admit Date: 12/9/2020       CC: F/U for right hand weakness, slurred speech and word finding difficulty    HPI:  Pt is a 62y.o. year-old female with a history of morbid obesity and obstructive sleep apnea to the emergency room for evaluation following an acute onset of right hand weakness and difficulty speaking. She states that her speech was slurred and she is having difficulty finding the right words to say. Her symptoms have waxed and waned since onset but have overall improved. CT of her head had no acute intracranial abnormalities, and a CTA of her head and neck had no flow-limiting stenosis. She is being admitted for further evaluation and treatment. Pt denies fall or head trauma, and associated signs and symptoms do not include neck pain or stiffness, diplopia or other vision changes, difficulty swallowing, numbness in the arms or legs, or focal neurological symptoms not mentioned above. Interval History/Subjective: patient sitting on edge of bed eating chocolate pudding and soda. She is feeling a little better than she was earlier. Hand weakness has improved to the point of now being able to lift things and use it whereas before her right hand at one point had difficulty lifting it and was dropping the remote. She has a spinal cord stimulator and  went to get the info on it to see if it can be MRI compatible. Neurology has been consulted by admitting MD. She states her word finding difficulty has cleared. No prior hx stroke/TIA for her. Lipids normal. She denies HTN. Was told she was \"pre-diabetic\" last check. She is 108 and 116 on chem panel here. Review of Systems:     The patient denied headaches, visual changes, LOC, SOB, CP, ABD pain, N/V/D, skin changes, new or worsening weakness or neuromuscular deficits. Comprehensive ROS negative except as mentioned above.     Past Medical History:        Diagnosis Date    Anxiety     Asthma     low O 2 SATS POST-OP    GERD (gastroesophageal reflux disease)     Herniated lumbar intervertebral disc     l4    IBS (irritable bowel syndrome)     Migraine     Nausea & vomiting     OA (osteoarthritis)     Sleep apnea     PT STATES \"PROBABLE\"    UTI (urinary tract infection)     FREQUENT       Past Surgical History:        Procedure Laterality Date    BREAST LUMPECTOMY      CHOLECYSTECTOMY, LAPAROSCOPIC      ENDOMETRIAL ABLATION      HYSTERECTOMY      JOINT REPLACEMENT      BILAT KNEES    LUMBAR SPINE SURGERY      stimulator in place    OTHER SURGICAL HISTORY  6/20/11    lap gastric sleeve    OTHER SURGICAL HISTORY Right 09/01/2017    RIGHT PATELLA REVISION      PAIN MANAGEMENT PROCEDURE Bilateral 8/27/2020    BILATERAL SACROILIAC JOINT INJECTION SITE CONFIRMED BY FLUOROSCOPY performed by Mica Bland MD at [facility] Bilateral 10/22/2020    BILATERAL SACROILIAC JOINT INJECTION SITE CONFIRMED BY FLUOROSCOPY performed by Mica Bland MD at [id number] [email or web address] RESECTION  4-16-10    resection of open wound and mass of suprapubic area    TUBAL LIGATION         Allergies:  Morphine; Scopolamine; Adhesive tape; Ampicillin; Nitrofurantoin; Percocet [oxycodone-acetaminophen]; Propoxyphene; Vicodin [hydrocodone-acetaminophen]; and Codeine    Past medical and surgical history reviewed. Any changes have been noted. PHYSICAL EXAM:  BP (!) 159/99   Pulse 89   Temp 97.6 °F (36.4 °C)   Resp 16   Ht 5' 8\" (1.727 m)   Wt (!) 324 lb 1.2 oz (147 kg)   SpO2 97%   BMI 49.28 kg/m²       Intake/Output Summary (Last 24 hours) at 12/10/2020 0854  Last data filed at 12/10/2020 9672  Gross per 24 hour   Intake 900 ml   Output 500 ml   Net 400 ml        General appearance:   No apparent distress, appears stated age. Cooperative. HEENT:  Normocephalic, atraumatic. PERRLA. EOMi. Conjunctivae/corneas clear, no icterus, non-injected.   Neck: Supple, with full range of motion. No jugular venous distention. Trachea midline. Respiratory:  Normal respiratory effort. Clear to auscultation, bilaterally without Rales/Wheezes/Rhonchi. Cardiovascular:  Regular rate and rhythm without murmurs, rubs or gallops. Abdomen: Soft, non-tender, non-distended, without rebound or guarding. Normal bowel sounds. Musculoskeletal:  No clubbing, cyanosis or edema bilaterally. Full range of motion without deformity. Skin: Skin color, texture, turgor normal.  No rashes or lesions. Neurologic:  Exam with possibly drift on RUE (?), DTR dulled trieps/brachial and radially +1 right arm but patient had difficulty with relaxing arm, Neurovascularly intact without any focal sensory/motor deficits otherwise. Cranial nerves: II-XII intact, grossly intact. No facial asymmetry, tongue midline. Psychiatric:  Alert and oriented, thought content appropriate  Capillary Refill: Brisk,< 3 seconds   Peripheral Pulses: +2 palpable, equal bilaterally       LABS:    Lab Results   Component Value Date    WBC 7.4 12/10/2020    HGB 11.7 (L) 12/10/2020    HCT 37.3 12/10/2020    MCV 74.9 (L) 12/10/2020     12/10/2020    LYMPHOPCT 37.7 12/10/2020    RBC 4.97 12/10/2020    MCH 23.6 (L) 12/10/2020    MCHC 31.5 12/10/2020    RDW 17.2 (H) 12/10/2020       Lab Results   Component Value Date    CREATININE 0.6 12/10/2020    BUN 13 12/10/2020     12/10/2020    K 4.0 12/10/2020     12/10/2020    CO2 25 12/10/2020       No results found for: MG    Lab Results   Component Value Date    ALT 18 02/11/2010    AST 22 02/11/2010    ALKPHOS 78 02/11/2010    BILITOT 0.30 02/11/2010        No flowsheet data found. No results found for: LABA1C    Imaging:  XR CHEST PORTABLE   Final Result   Suboptimal portable study on an obese patient. No acute disease. When the patient is able, a follow-up inspiratory PA and lateral examination   of the chest is recommended.          CTA HEAD NECK W CONTRAST   Final Result   1. No flow limiting stenosis of the cervical carotid/vertebral arteries. 2. No focal stenosis of the [id number]. CT HEAD WO CONTRAST   Final Result   No acute intracranial abnormality. Critical results were called by Dr. Britany Blanton MD to [facility] on   12/10/2020 at 00:51. Scheduled and prn Medications:    Scheduled Meds:   rOPINIRole  3 mg Oral Nightly    pantoprazole  40 mg Oral QAM AC    montelukast  10 mg Oral Nightly    DULoxetine  60 mg Oral Daily    sodium chloride flush  10 mL Intravenous 2 times per day    enoxaparin  40 mg Subcutaneous Daily    aspirin  81 mg Oral Daily    Or    aspirin  300 mg Rectal Daily    atorvastatin  80 mg Oral Nightly     Continuous Infusions:  PRN Meds:.cyclobenzaprine, albuterol, sodium chloride flush, polyethylene glycol, promethazine **OR** ondansetron, acetaminophen    Assessment & Plan:        Stroke-like symptoms (right hand weakness and incoordination, word-finding difficulties) - The following medications, tests and consults have been ordered;  ? CT of the head is negative for acute findings  ? CTA of the Head and Neck are negative for significant stenosis  ? MRI of the brain has been ordered  ? Echocardiogram with bubble study ordered  ? Stroke risk factors panel: A1C, lipid panel, EKG  ? Monitor for arrhythmias on Telemetry  ? Serial neurological checks will be preformed  ? Aspirin 81mg daily   ? Statin daily  ? Lipid profile ordered -- normal  ? PT/OT has been consulted  ? No obvious dysphagia, so swallow screen by nursing before diet and oral medications started  ? Neurology has been consulted  ? Checking TTE  ? Permissive HTN x24-48 hrs. (she is not on any home anti-HTN meds and is normotensive  ? MRI w/out if able.         Morbid obesity due to excess calories (Body mass index is 49.42 kg/m². ) - Complicating assessment and treatment.  Placing patient at risk for multiple co-morbidities as well as early death and contributing to the patient's presentation.  on weight loss when appropriate    Continue current regimen/therapies. Monitor. Adjust medical regimen as appropriate. Body mass index is 49.28 kg/m². The patient and / or the family were informed of the results of any tests, a time was given to answer questions, a plan was proposed and they agreed with plan.       DVT ppx: lovenox  GI ppx: protonix    Diet: DIET GENERAL;    Consults:  IP CONSULT TO STROKE TEAM  IP CONSULT TO NEUROLOGY    DISPO/placement plan: home tomorrow after MRI if able    Code Status: Full Code      KEISHA Meng - AMANDA  12/10/20

## 2020-12-10 NOTE — PLAN OF CARE
Problem: Falls - Risk of:  Goal: Will remain free from falls  Description: Will remain free from falls  Outcome: Ongoing     Problem: ACTIVITY INTOLERANCE/IMPAIRED MOBILITY  Goal: Mobility/activity is maintained at optimum level for patient  Outcome: Ongoing

## 2020-12-10 NOTE — PROGRESS NOTES
4 Eyes Skin Assessment     NAME:  Kyree Mondragon OF BIRTH:  1960  MEDICAL RECORD NUMBER:  [id number]    The patient is being assess for  Admission    I agree that 2 RN's have performed a thorough Head to Toe Skin Assessment on the patient. ALL assessment sites listed below have been assessed. Areas assessed by both nurses:    Head, Face, Ears, Shoulders, Back, Chest, Arms, Elbows, Hands, Sacrum. Buttock, Coccyx, Ischium and Legs. Feet and Heels        Does the Patient have a Wound?  No noted wound(s)       Tom Prevention initiated:  NA   Wound Care Orders initiated:  NA    Pressure Injury (Stage 3,4, Unstageable, DTI, NWPT, and Complex wounds) if present place consult order under [de-identified] NA    New and Established Ostomies if present place consult order under : NA      Nurse 1 eSignature: Electronically signed by Leila Choudhary RN on 12/10/20 at 4:11 AM EST    **SHARE this note so that the co-signing nurse is able to place an eSignature**    Nurse 2 eSignature: Electronically signed by Kath Yang RN on 12/10/20 at 4:12 AM EST

## 2020-12-11 ENCOUNTER — TELEPHONE (OUTPATIENT)
Dept: CARDIOLOGY CLINIC | Age: 60
End: 2020-12-11

## 2020-12-11 VITALS
RESPIRATION RATE: 18 BRPM | TEMPERATURE: 97.8 F | SYSTOLIC BLOOD PRESSURE: 138 MMHG | DIASTOLIC BLOOD PRESSURE: 71 MMHG | BODY MASS INDEX: 44.41 KG/M2 | HEIGHT: 68 IN | HEART RATE: 97 BPM | WEIGHT: 293 LBS | OXYGEN SATURATION: 99 %

## 2020-12-11 LAB
ANION GAP SERPL CALCULATED.3IONS-SCNC: 11 MMOL/L (ref 3–16)
BASOPHILS ABSOLUTE: 0.1 K/UL (ref 0–0.2)
BASOPHILS RELATIVE PERCENT: 0.9 %
BUN BLDV-MCNC: 10 MG/DL (ref 7–20)
CALCIUM SERPL-MCNC: 9.1 MG/DL (ref 8.3–10.6)
CHLORIDE BLD-SCNC: 106 MMOL/L (ref 99–110)
CO2: 22 MMOL/L (ref 21–32)
CREAT SERPL-MCNC: 0.6 MG/DL (ref 0.6–1.2)
EOSINOPHILS ABSOLUTE: 0.2 K/UL (ref 0–0.6)
EOSINOPHILS RELATIVE PERCENT: 3.5 %
GFR AFRICAN AMERICAN: >60
GFR NON-AFRICAN AMERICAN: >60
GLUCOSE BLD-MCNC: 106 MG/DL (ref 70–99)
HCT VFR BLD CALC: 33.6 % (ref 36–48)
HEMOGLOBIN: 10.6 G/DL (ref 12–16)
LYMPHOCYTES ABSOLUTE: 2.3 K/UL (ref 1–5.1)
LYMPHOCYTES RELATIVE PERCENT: 36.6 %
MCH RBC QN AUTO: 23.7 PG (ref 26–34)
MCHC RBC AUTO-ENTMCNC: 31.6 G/DL (ref 31–36)
MCV RBC AUTO: 75.1 FL (ref 80–100)
MONOCYTES ABSOLUTE: 0.5 K/UL (ref 0–1.3)
MONOCYTES RELATIVE PERCENT: 8.1 %
NEUTROPHILS ABSOLUTE: 3.2 K/UL (ref 1.7–7.7)
NEUTROPHILS RELATIVE PERCENT: 50.9 %
PDW BLD-RTO: 17.4 % (ref 12.4–15.4)
PLATELET # BLD: 214 K/UL (ref 135–450)
PMV BLD AUTO: 8.8 FL (ref 5–10.5)
POTASSIUM REFLEX MAGNESIUM: 4.2 MMOL/L (ref 3.5–5.1)
RBC # BLD: 4.48 M/UL (ref 4–5.2)
SARS-COV-2, NAAT: NOT DETECTED
SARS-COV-2, PCR: NOT DETECTED
SODIUM BLD-SCNC: 139 MMOL/L (ref 136–145)
WBC # BLD: 6.4 K/UL (ref 4–11)

## 2020-12-11 PROCEDURE — 96372 THER/PROPH/DIAG INJ SC/IM: CPT

## 2020-12-11 PROCEDURE — 36415 COLL VENOUS BLD VENIPUNCTURE: CPT

## 2020-12-11 PROCEDURE — G0378 HOSPITAL OBSERVATION PER HR: HCPCS

## 2020-12-11 PROCEDURE — U0003 INFECTIOUS AGENT DETECTION BY NUCLEIC ACID (DNA OR RNA); SEVERE ACUTE RESPIRATORY SYNDROME CORONAVIRUS 2 (SARS-COV-2) (CORONAVIRUS DISEASE [COVID-19]), AMPLIFIED PROBE TECHNIQUE, MAKING USE OF HIGH THROUGHPUT TECHNOLOGIES AS DESCRIBED BY CMS-2020-01-R: HCPCS

## 2020-12-11 PROCEDURE — 80048 BASIC METABOLIC PNL TOTAL CA: CPT

## 2020-12-11 PROCEDURE — U0002 COVID-19 LAB TEST NON-CDC: HCPCS

## 2020-12-11 PROCEDURE — 99245 OFF/OP CONSLTJ NEW/EST HI 55: CPT | Performed by: INTERNAL MEDICINE

## 2020-12-11 PROCEDURE — 6370000000 HC RX 637 (ALT 250 FOR IP): Performed by: INTERNAL MEDICINE

## 2020-12-11 PROCEDURE — 85025 COMPLETE CBC W/AUTO DIFF WBC: CPT

## 2020-12-11 RX ORDER — ATORVASTATIN CALCIUM 80 MG/1
80 TABLET, FILM COATED ORAL NIGHTLY
Qty: 30 TABLET | Refills: 3 | Status: SHIPPED | OUTPATIENT
Start: 2020-12-11 | End: 2021-12-20

## 2020-12-11 RX ORDER — ASPIRIN 81 MG/1
81 TABLET ORAL DAILY
Qty: 30 TABLET | Refills: 3 | Status: ON HOLD | OUTPATIENT
Start: 2020-12-11 | End: 2022-05-03 | Stop reason: HOSPADM

## 2020-12-11 RX ADMIN — PANTOPRAZOLE SODIUM 40 MG: 40 TABLET, DELAYED RELEASE ORAL at 07:31

## 2020-12-11 ASSESSMENT — PAIN SCALES - WONG BAKER
WONGBAKER_NUMERICALRESPONSE: 0

## 2020-12-11 NOTE — PROGRESS NOTES
Progress Note    Updates  No significant events overnight. Feeling stronger.       Active Ambulatory Problems     Diagnosis Date Noted    Surgical wound infection 04/16/2010    Asthma 04/16/2010    Sleep apnea 04/16/2010    OA (osteoarthritis) of knee 04/16/2010    Morbid obesity due to excess calories ([facility] [address] 06/21/2011    Status post partial gastrectomy 06/21/2011    Right knee pain 09/28/2015    Low back pain with radiation 09/28/2015    Disc degeneration, lumbar 09/28/2015    Neuropathy of right lower extremity 09/28/2015    Status post total right knee replacement 09/28/2015    Sacroiliitis, not elsewhere classified [facility])      Past Medical History:   Diagnosis Date    Anxiety     GERD (gastroesophageal reflux disease)     Herniated lumbar intervertebral disc     IBS (irritable bowel syndrome)     Migraine     Nausea & vomiting     OA (osteoarthritis)     UTI (urinary tract infection)      Past Surgical History:   Procedure Laterality Date    BREAST LUMPECTOMY      CHOLECYSTECTOMY, LAPAROSCOPIC      ENDOMETRIAL ABLATION      HYSTERECTOMY      JOINT REPLACEMENT      BILAT KNEES    LUMBAR SPINE SURGERY      stimulator in place    OTHER SURGICAL HISTORY  6/20/11    lap gastric sleeve    OTHER SURGICAL HISTORY Right 09/01/2017    RIGHT PATELLA REVISION      PAIN MANAGEMENT PROCEDURE Bilateral 8/27/2020    BILATERAL SACROILIAC JOINT INJECTION SITE CONFIRMED BY FLUOROSCOPY performed by Emily De León MD at [address] [facility] Bilateral 10/22/2020    BILATERAL SACROILIAC JOINT INJECTION SITE CONFIRMED BY FLUOROSCOPY performed by Emily De León MD at [id number] 5o9 RESECTION  4-16-10    resection of open wound and mass of suprapubic area    TUBAL LIGATION       Current Facility-Administered Medications:     rOPINIRole (REQUIP) tablet 3 mg, 3 mg, Oral, Nightly, Jasmyn Donovan MD, 3 mg at 12/10/20 2022    pantoprazole (PROTONIX) tablet 40 mg, 40 mg, Oral, QAM AC, Shyanne Martinez MD, 40 mg at 12/11/20 0731    montelukast (SINGULAIR) tablet 10 mg, 10 mg, Oral, Nightly, Shyanne Martinez MD, 10 mg at 12/10/20 2022    DULoxetine (CYMBALTA) extended release capsule 60 mg, 60 mg, Oral, Daily, Shyanne Martinez MD, 60 mg at 12/10/20 0836    cyclobenzaprine (FLEXERIL) tablet 10 mg, 10 mg, Oral, TID PRN, Shyanne Martinez MD, 10 mg at 12/10/20 4869    albuterol (PROVENTIL) nebulizer solution 2.5 mg, 2.5 mg, Nebulization, Q4H PRN, Shyanne Martinez MD    sodium chloride flush 0.9 % injection 10 mL, 10 mL, Intravenous, 2 times per day, Shyanne Martinez MD, 10 mL at 12/10/20 2023    sodium chloride flush 0.9 % injection 10 mL, 10 mL, Intravenous, PRN, Shyanne Martinez MD    polyethylene glycol [facility]) packet 17 g, 17 g, Oral, Daily PRN, Shyanne Martinez MD    promethazine (PHENERGAN) tablet 12.5 mg, 12.5 mg, Oral, Q6H PRN **OR** ondansetron (ZOFRAN) injection 4 mg, 4 mg, Intravenous, Q4H PRN, Shyanne Martinez MD    aspirin EC tablet 81 mg, 81 mg, Oral, Daily, 81 mg at 12/10/20 0836 **OR** aspirin suppository 300 mg, 300 mg, Rectal, Daily, Shyanne Martinez MD    atorvastatin (LIPITOR) tablet 80 mg, 80 mg, Oral, Nightly, Shyanne Martinez MD, 80 mg at 12/10/20 2022    acetaminophen (TYLENOL) tablet 650 mg, 650 mg, Oral, Q6H PRN, Navya Long APRN - CNP, 650 mg at 12/10/20 1218    enoxaparin (LOVENOX) injection 40 mg, 40 mg, Subcutaneous, BID, Sherry Frias APRN - CNP, 40 mg at 12/10/20 2023    Exam  Blood pressure 138/71, pulse 97, temperature 97.8 °F (36.6 °C), temperature source Oral, resp. rate 18, height 5' 8\" (1.727 m), weight (!) 321 lb 14 oz (146 kg), SpO2 99 %.   Constitutional                          Vital signs: BP, HR, and RR reviewed            General alert, no distress  Eyes: unable to visualize the fundi  Cardiovascular: pulses symmetric in all 4 extremities  Psychiatric: cooperative with examination, no psychotic behavior noted. Neurologic  Mental status:   orientation to person, place              Attention intact as able to attend well to the exam                Language fluent in conversation              Comprehension intact; follows simple commands  Cranial nerves:   CN2: visual fields full  CN 3,4,6: extraocular muscles intact. Pupils are equal, round, reactive bilaterally. CN7: face symmetric without dysarthria  CN8: hearing grossly intact  CN11: trap full strength on shoulder shrug  CN12: tongue midline with protrusion  Strength: mild RUE weakness/clumsiness. Good strength in her other 3 limbs. Sensory: improving distal RUE sensory deficit. Cerebellar/coordination: just a bit clumsy w/ her RUE. Tone: normal in all 4 extremities  Gait: deferred at this time for safety. ROS  Constitutional- No weight loss or fevers  Eyes- No diplopia. No photophobia. Ears/nose/throat- No dysphagia. + Dysarthria  Cardiovascular- No palpitations. No chest pain  Respiratory- No dyspnea. No Cough  Gastrointestinal- No Abdominal pain. No Vomiting. Genitourinary- No incontinence. No urinary retention  Musculoskeletal- No myalgia. + arthralgia  Skin- No rash. No easy bruising. Psychiatric- No depression. No anxiety  Endocrine- No diabetes. No thyroid issues. Hematologic- No bleeding difficulty. No fatigue  Neurologic- + weakness. No Headache. Labs  HgA1c 5.9     Cholesterol, Total 143   HDL Cholesterol 49   LDL Calculated 75   Triglycerides 95   VLDL Cholesterol Calculated 19     COVID negative    Studies  CT head w/o 12/10/20  No acute intracranial abnormality.       CTA head/neck 12/10/20  1. No flow limiting stenosis of the cervical carotid/vertebral arteries. 2. No focal stenosis of the [id number].       TTE 12/10/20  EF 60%  L atrium upper limits of normal.    Evidence of R to L shunting. EKG 12/10/20  NSR    Impression  1.  RUE weakness/sensory deficit, improving.   Clinically suggestive of acute L hemispheric stroke. Unable to obtain MRI due to spinal cord stimulator. Risk factors for stroke are relatively well controlled. Neurovascular imaging is unrevealing. She was identified to have PFO/ASD on ECHO. Recommendations  1. Continue 81 mg aspirin. DAPT would be reasonable for 3 weeks. Afterwards would resume monotherapy w/ aspirin. 2.  High intensity statin. 3.  Normotension, normoglycemia. 4.  Follow up w/ Dr. Sujatha Andersen for MITALI and probable PFO closure. 5.  Consider 30 day event monitor to assess for cardiac arrhythmia. 6.  She can follow up w/ Dr. Ning Amin after discharge. Please call back w/ any additional questions or concerns. Thank you.       Winnie Ace NP  [address] [place] [address] [address] Neurology    A copy of this note was provided for Dr Lucio Vivar MD

## 2020-12-11 NOTE — TELEPHONE ENCOUNTER
Received message from Carlos S Marcelino Robles that patient needs to be scheduled for outpatient MITALI next week. She will be Covid test today before being discharged from the hospital.       [facility] [place] [facility] [place]   The morning of your Procedure-MITALI you will park in the hospital parking lot and report directly to the cath lab to check in. DATE: 12/17/2020 TIME: 12:00      Pre-Procedure Instructions:  1. Do not eat or drink anything 8 hours before your procedure. 2. Hold all diabetic medications the morning of the procedure including, Metfomin. 3. If you take Lantus/Levemir only take ½ your normal dose the evening before. 4. All other medications can be taken in the morning with sips of water. 5. Do not hold anticoagulation therapy: warfarin, eliquis, xarelto therapy. 6. Do not use any lotions, creams or perfume the morning of procedure. 7. Please arrive 1 hour prior to procedure time. 8. Please have a responsible adult to drive you home after procedure. It is recommended you do not drive for 24 hours after procedure. 9. Cath lab will provide you with all post procedure instructions. If you have any questions regarding the procedure itself or medications please call [phone] and ask to speak with a nurse. Published on [employer] and e-mail to [facility].

## 2020-12-11 NOTE — CONSULTS
[facility]  Cardiology Consult    Mendez Strauss  1960 December 11, 2020      Reason for Referral: PFO and stroke    CC: Right upper extremity weakness. Subjective:     History of Present Illness:    Mendez Strauss is a 62 y.o. patient with a PMH significant for anxiety, asthma, migraine headaches presented with complaints of right upper extremity weakness and dysarthria. Patient came to the hospital after she was started having difficulty using her phone and iPad. Feels her hand to be weak. Also felt her words could not be expressed clearly. She called her daughter although she thought she was calling her . She was brought to the emergency room with strokelike symptoms. UC stroke team was consulted TPA was not recommended. Past Medical History:   has a past medical history of Anxiety, Asthma, GERD (gastroesophageal reflux disease), Herniated lumbar intervertebral disc, IBS (irritable bowel syndrome), Migraine, Nausea & vomiting, OA (osteoarthritis), Sleep apnea, and UTI (urinary tract infection). Surgical History:   has a past surgical history that includes Hysterectomy; Endometrial ablation; Tubal ligation; Cholecystectomy, laparoscopic; joint replacement; Breast lumpectomy; Soft Tissue Tumor Resection (4-16-10); other surgical history (6/20/11); Lumbar spine surgery; other surgical history (Right, 09/01/2017); Pain management procedure (Bilateral, 8/27/2020); and Pain management procedure (Bilateral, 10/22/2020). Social History:   reports that she has never smoked. She has never used smokeless tobacco. She reports current alcohol use. She reports that she does not use drugs. Family History:  family history includes Cancer in her mother; Diabetes in her sister; Heart Disease in her father, paternal aunt, and paternal uncle; High Blood Pressure in her brother, father, and sister; Stroke in her father.     Home Medications:  Were reviewed and are listed in nursing record and/or below  Prior to Admission medications    Medication Sig Start Date End Date Taking? Authorizing Provider   atorvastatin (LIPITOR) 10 MG tablet Take 10 mg by mouth daily   Yes Historical Provider, MD   sennosides-docusate sodium (SENOKOT-S) 8.6-50 MG tablet Take 1 tablet by mouth daily   Yes Historical Provider, MD   cyclobenzaprine (FLEXERIL) 10 MG tablet Take 10 mg by mouth 3 times daily as needed for Muscle spasms (patient unsure of dose)   Yes Historical Provider, MD   DULoxetine (CYMBALTA) 60 MG extended release capsule  7/22/17  Yes Historical Provider, MD   rOPINIRole (REQUIP) 2 MG tablet Take 3 mg by mouth nightly  9/16/15  Yes Historical Provider, MD   montelukast (SINGULAIR) 10 MG tablet Take 10 mg by mouth nightly. Yes Historical Provider, MD   omeprazole (PRILOSEC) 20 MG capsule Take 20 mg by mouth nightly. 4/15/10  Yes Historical Provider, MD   albuterol (PROAIR HFA) 108 (90 BASE) MCG/ACT inhaler Inhale 2 puffs into the lungs every 6 hours as needed. Historical Provider, MD        CURRENT Medications:  rOPINIRole (REQUIP) tablet 3 mg, Nightly  pantoprazole (PROTONIX) tablet 40 mg, QAM AC  montelukast (SINGULAIR) tablet 10 mg, Nightly  DULoxetine (CYMBALTA) extended release capsule 60 mg, Daily  cyclobenzaprine (FLEXERIL) tablet 10 mg, TID PRN  albuterol (PROVENTIL) nebulizer solution 2.5 mg, Q4H PRN  sodium chloride flush 0.9 % injection 10 mL, 2 times per day  sodium chloride flush 0.9 % injection 10 mL, PRN  polyethylene glycol (GLYCOLAX) packet 17 g, Daily PRN  promethazine (PHENERGAN) tablet 12.5 mg, Q6H PRN    Or  ondansetron (ZOFRAN) injection 4 mg, Q4H PRN  aspirin EC tablet 81 mg, Daily    Or  aspirin suppository 300 mg, Daily  atorvastatin (LIPITOR) tablet 80 mg, Nightly  acetaminophen (TYLENOL) tablet 650 mg, Q6H PRN  enoxaparin (LOVENOX) injection 40 mg, BID        Allergies:  Morphine; Scopolamine; Adhesive tape;  Ampicillin; Nitrofurantoin; Percocet [oxycodone-acetaminophen]; Propoxyphene; Vicodin [hydrocodone-acetaminophen]; and Codeine           Review of Systems: SEE HPI   · Constitutional: no unanticipated weight loss. There's been no change in energy level, sleep pattern, or activity level. No fevers, chills. · Eyes: No visual changes or diplopia. No scleral icterus. · ENT: No Headaches, hearing loss or vertigo. No mouth sores or sore throat. · Cardiovascular: No Chest pain, tightness or discomfort.  No Shortness of breath. No Dyspnea on exertion, Orthopnea, Paroxysmal nocturnal dyspnea or breathlessness at rest.   No Palpitations.  No Syncope ('blackouts', 'faints', 'collapse') or dizziness. · Respiratory: No cough or wheezing, no sputum production. No hematemesis. · Gastrointestinal: No abdominal pain, appetite loss, blood in stools. No change in bowel or bladder habits. · Genitourinary: No dysuria, trouble voiding, or hematuria. · Musculoskeletal:  No gait disturbance, no joint complaints. · Integumentary: No rash or pruritis. · Neurological: Right upper extremity weakness, dysarthria no headache, diplopia, change in muscle strength, numbness or tingling. · Psychiatric: No anxiety or depression. · Endocrine: No temperature intolerance. No excessive thirst, fluid intake, or urination. No tremor. · Hematologic/Lymphatic: No abnormal bruising or bleeding, blood clots or swollen lymph nodes. · Allergic/Immunologic: No nasal congestion or hives. Objective:     PHYSICAL EXAM:      Vitals:    12/11/20 0807   BP: 138/71   Pulse: 97   Resp: 18   Temp: 97.8 °F (36.6 °C)   SpO2: 99%    Weight: (!) 321 lb 14 oz (146 kg)       General Appearance:  Alert, cooperative, no distress, appears stated age. Head:  Normocephalic, without obvious abnormality, atraumatic. Eyes:  Pupils equal and round. No scleral icterus. Mouth: Moist mucosa, no pharyngeal erythema. Nose: Nares normal. No drainage or sinus tenderness.    Neck: Supple, symmetrical, trachea midline. No adenopathy. No tenderness/mass/nodules. No carotid bruit or elevated JVD. Lungs:   Respiratory Effort: Normal   Auscultation: Clear to auscultation bilaterally, respirations unlabored. No wheeze, rales   Chest Wall:  No tenderness or deformity. Cardiovascular:    Pulses  Palpation: normal   Ascultation: Regular rate, S1/ S2 normal. No murmur, rub, or gallop. 2+ radial and pedal pulses, symmetric  Carotid  Femoral   Abdomen and Gastrointestinal:   Soft, non-tender, bowel sounds active. Liver and Spleen  Masses   Musculoskeletal: No muscle wasting  Back  Gait   Extremities: Extremities normal, atraumatic. No cyanosis or edema. No cyanosis clubbing       Skin: Inspection and palpation performed, no rashes or lesions. Pysch: Normal mood and affect.  Alert and oriented to time place person   Neurologic: Normal gross motor and sensory exam.       Labs     All labs have been reviewed    Lab Results   Component Value Date    WBC 6.4 12/11/2020    RBC 4.48 12/11/2020    HGB 10.6 12/11/2020    HCT 33.6 12/11/2020    MCV 75.1 12/11/2020    RDW 17.4 12/11/2020     12/11/2020     Lab Results   Component Value Date     12/11/2020    K 4.2 12/11/2020     12/11/2020    CO2 22 12/11/2020    BUN 10 12/11/2020    CREATININE 0.6 12/11/2020    GFRAA >60 12/11/2020    GFRAA 114 06/07/2011    AGRATIO 1.4 02/11/2010    LABGLOM >60 12/11/2020    GLUCOSE 106 12/11/2020    PROT 7.7 02/11/2010    CALCIUM 9.1 12/11/2020    BILITOT 0.30 02/11/2010    ALKPHOS 78 02/11/2010    AST 22 02/11/2010    ALT 18 02/11/2010     No results found for: PTINR  Lab Results   Component Value Date    LABA1C 5.9 12/10/2020     Lab Results   Component Value Date    TROPONINI <0.01 12/10/2020       Cardiac, Vascular and Imaging Data all Personally Reviewed in Detail by Myself      EKG: NSR    Echocardiogram: Summary   A bubble study was performed and shows evidence of right to left shunting   consistent with a patent foramen ovale or atrial septal defect. Normal LV size & wall motion; EF    60 %. Grade I diastolic dysfunction. The left atrium is at the upper limits of normal in size. Mild mitral regurgitation. Assessment and Plan     -Left cerebral hemisphere stroke, weakness RUE  Patent foramen ovale. Recommend transesophageal echocardiogram  Recommend dual antiplatelet therapy. Aggressive risk factor modification lipid therapy. If PFO confirmed will need closure    -HTN: Continue BP control. Thank you for allowing us to participate in the care of Iris Urias. Please do not hesitate to contact me if you have any questions.     Justine Saab MD, MPH    [address]   [place] [address]  Ph: [phone]  Fax: [phone]    12/11/2020 8:55 AM

## 2020-12-11 NOTE — DISCHARGE SUMMARY
Hospital Medicine Discharge Summary    Patient ID: Martínez Snow      Patient's PCP: Varun Jose    Admit Date: 12/9/2020     Discharge Date:   12/11/2020    Admitting Physician: Jasmyn Donovan MD     Discharge Physician: KEISHA Rose - CNP       Discharge Diagnoses: Active Hospital Problems    Diagnosis Date Noted    Cerebrovascular accident (CVA) due to embolism of cerebral artery ([address] [I63.40]     PFO (patent foramen ovale) [Q21.1]     Stroke-like symptoms [R29.90] 12/10/2020    Morbid obesity due to excess calories ([facility] [address] [E66.01] 06/21/2011       The patient was seen and examined on day of discharge and this discharge summary is in conjunction with any daily progress note from day of discharge. Disposition:  [x] Home  [] Home with home health [] Rehab [] Psych [] SNF  [] LTAC  [] Long term nursing home or group home [] Transfer to ICU  [] Transfer to PCU [] Other:      Discharge Instructions/Follow-up:  Cardiology Dr. Juan Manuel Valdivia as scheduled     PCP/SNF to follow up: 1-2 wks after d/c    D/C condition: stable    Code status: full    Activity: as tolerated     Diet: low fat    D/C Meds: high dose statin 80mg atorvastatin + ASA 81mg daily.      Hospital Course: Pt is a 62y.o. year-old female with a history of morbid obesity and obstructive sleep apnea to the emergency room for evaluation following an acute onset of right hand weakness and difficulty speaking.  She states that her speech was slurred and she is having difficulty finding the right words to say.  Her symptoms have waxed and waned since onset but have overall improved.  CT of her head had no acute intracranial abnormalities, and a CTA of her head and neck had no flow-limiting stenosis. Violeta Rossi is being admitted for further evaluation and treatment. Pt denies fall or head trauma, and associated signs and symptoms do not include neck pain or stiffness, diplopia or other vision changes, difficulty swallowing, numbness in the arms or legs, or focal neurological symptoms not mentioned above.   Neurology was consulted for possible TIA/Stroke. She has a spinal cord stimulator so unable to get MRI. She states her word finding difficulty has cleared. No prior hx stroke/TIA for her.      Lipids normal. She denies HTN. Was told she was \"pre-diabetic\" last check. She is 108 and 116 on chem panel here.     Echo showed right to left shunting   Dr. Papo Banks consulted and will get MITALI as outpatient. Patient was tested for COVID incase she should get this test done prior to discharge, and was negative. No respiratory symptoms. Clinically, she appears to have had a subcortical stroke, CTA normal. Unable to get MRI d/t spinal stimulator that is unable to turned off for test.     Will have her f/u as outpatient w/Neurology and Cardiology as above. She still has some tingling in a few fingers but strength has returned to right hand    Ok for d/c with f/u as advised      In summary:   Stroke-like symptoms (right hand weakness and incoordination, word-finding difficulties) - The following medications, tests and consults have been ordered;  ? CT of the head is negative for acute findings  ? CTA of the Head and Neck are negative for significant stenosis  ? MRI of the brain has been ordered  ? Echocardiogram with bubble study ordered  ? Stroke risk factors panel: A1C, lipid panel, EKG  ? Monitor for arrhythmias on Telemetry  ? Serial neurological checks will be preformed  ? Aspirin 81mg daily   ? Statin daily  ? Lipid profile ordered -- normal  ? PT/OT has been consulted  ? No obvious dysphagia, so swallow screen by nursing before diet and oral medications started  ? Neurology has been consulted  ? Checking TTE  ? Permissive HTN x24-48 hrs. (she is not on any home anti-HTN meds and is normotensive  ? MRI w/out if able.    Exam:     /71   Pulse 97   Temp 97.8 °F (36.6 °C) (Oral)   Resp 18   Ht 5' 8\" (1.727 m)   Wt (!) 321 lb 14 oz (146 kg) SpO2 99%   BMI 48.94 kg/m²   General appearance: No apparent distress, appears stated age and cooperative. HEENT: Pupils equal, round, and reactive to light. Conjunctivae/corneas clear. Neck: Supple, with full range of motion. No jugular venous distention. Trachea midline. Respiratory:  Normal respiratory effort. Clear to auscultation, bilaterally without Rales/Wheezes/Rhonchi. Cardiovascular: Regular rate and rhythm with normal S1/S2 without murmurs, rubs or gallops. Abdomen: Soft, non-tender, non-distended with normal bowel sounds. Musculoskelatal: No clubbing, cyanosis or edema bilaterally. Full range of motion without deformity. Skin: Skin color, texture, turgor normal.  No rashes or lesions. Neurologic:  Neurovascularly intact without any focal sensory/motor deficits. Cranial nerves: II-XII intact, grossly non-focal.  Psychiatric: Alert and oriented, thought content appropriate, normal insight      Consults:     IP CONSULT TO STROKE TEAM  IP CONSULT TO NEUROLOGY  IP CONSULT TO CARDIOLOGY    Diagnostic tests: Imaging:  XR CHEST PORTABLE   Final Result   Suboptimal portable study on an obese patient. No acute disease.       When the patient is able, a follow-up inspiratory PA and lateral examination   of the chest is recommended.           CTA HEAD NECK W CONTRAST   Final Result   1. No flow limiting stenosis of the cervical carotid/vertebral arteries. 2. No focal stenosis of the [id number].           CT HEAD WO CONTRAST   Final Result   No acute intracranial abnormality             Labs:  For convenience and continuity at follow-up the following most recent labs are provided:      CBC:    Lab Results   Component Value Date    WBC 6.4 12/11/2020    HGB 10.6 12/11/2020    HCT 33.6 12/11/2020     12/11/2020       Renal:    Lab Results   Component Value Date     12/11/2020    K 4.2 12/11/2020     12/11/2020    CO2 22 12/11/2020    BUN 10 12/11/2020    CREATININE 0.6 12/11/2020 CALCIUM 9.1 12/11/2020       Discharge Medications:     Current Discharge Medication List           Details   aspirin 81 MG EC tablet Take 1 tablet by mouth daily  Qty: 30 tablet, Refills: 3              Details   atorvastatin (LIPITOR) 80 MG tablet Take 1 tablet by mouth nightly  Qty: 30 tablet, Refills: 3              Details   sennosides-docusate sodium (SENOKOT-S) 8.6-50 MG tablet Take 1 tablet by mouth daily      cyclobenzaprine (FLEXERIL) 10 MG tablet Take 10 mg by mouth 3 times daily as needed for Muscle spasms (patient unsure of dose)      DULoxetine (CYMBALTA) 60 MG extended release capsule       rOPINIRole (REQUIP) 2 MG tablet Take 3 mg by mouth nightly       montelukast (SINGULAIR) 10 MG tablet Take 10 mg by mouth nightly. omeprazole (PRILOSEC) 20 MG capsule Take 20 mg by mouth nightly. albuterol (PROAIR HFA) 108 (90 BASE) MCG/ACT inhaler Inhale 2 puffs into the lungs every 6 hours as needed. Time Spent on discharge is more than 30 minutes in the examination, evaluation, counseling and review of medications and discharge plan. Signed:    KEISHA Stinson CNP   12/11/2020      Thank you Elisha Hernández for the opportunity to be involved in this patient's care. If you have any questions or concerns please feel free to contact me at [phone].

## 2020-12-11 NOTE — PROGRESS NOTES
Discharge instructions reviewed with pt/family, discussed medications, VU, denies any further questions or anxiety related to discharge. Pt VU she will get MITALI outpatient on 12/17 @12pm with Dr. Mirian Lamas. Pt VU to schedule f/u with Neuro in one month. IV/tele discontinued. Pt discharged to home with . Taken from room via wheelchair by nurse's aid, personal belongings taken with. New medications supplied through [employer]        Electronically signed by Jefe Rock RN on 12/11/2020 at 1:28 PM

## 2020-12-14 ENCOUNTER — TELEPHONE (OUTPATIENT)
Dept: CARDIOLOGY CLINIC | Age: 60
End: 2020-12-14

## 2020-12-14 NOTE — TELEPHONE ENCOUNTER
Letter typed. Called and spoke with patient who stated she would like the letter sent through [employer]. Letter sent through [employer] via epic.

## 2020-12-14 NOTE — TELEPHONE ENCOUNTER
Lawson Suarez called in this afternoon and was discharged from the hospital on 12/9. She is needing a clearance letter so that she can return to work. She is scheduled for her MITALI on 12/17 and her covid test result from hospital was negative.     He can be reached at [phone]

## 2020-12-17 ENCOUNTER — HOSPITAL ENCOUNTER (OUTPATIENT)
Dept: CARDIAC CATH/INVASIVE PROCEDURES | Age: 60
Discharge: HOME | End: 2020-12-17
Payer: COMMERCIAL

## 2020-12-17 VITALS
TEMPERATURE: 97.7 F | HEIGHT: 68 IN | WEIGHT: 293 LBS | OXYGEN SATURATION: 99 % | RESPIRATION RATE: 14 BRPM | HEART RATE: 99 BPM | SYSTOLIC BLOOD PRESSURE: 143 MMHG | DIASTOLIC BLOOD PRESSURE: 93 MMHG | BODY MASS INDEX: 44.41 KG/M2

## 2020-12-17 LAB
LV EF: 58 %
LVEF MODALITY: NORMAL

## 2020-12-17 PROCEDURE — 93312 ECHO TRANSESOPHAGEAL: CPT

## 2020-12-17 PROCEDURE — 6360000002 HC RX W HCPCS

## 2020-12-17 PROCEDURE — 99152 MOD SED SAME PHYS/QHP 5/>YRS: CPT

## 2020-12-17 PROCEDURE — 93325 DOPPLER ECHO COLOR FLOW MAPG: CPT

## 2020-12-17 PROCEDURE — 2580000003 HC RX 258

## 2020-12-17 RX ORDER — CLOPIDOGREL BISULFATE 75 MG/1
75 TABLET ORAL DAILY
Qty: 30 TABLET | Refills: 3 | Status: SHIPPED | OUTPATIENT
Start: 2020-12-17 | End: 2021-03-12 | Stop reason: SDUPTHER

## 2020-12-17 RX ORDER — SODIUM CHLORIDE 0.9 % (FLUSH) 0.9 %
10 SYRINGE (ML) INJECTION EVERY 12 HOURS SCHEDULED
Status: DISCONTINUED | OUTPATIENT
Start: 2020-12-17 | End: 2020-12-18 | Stop reason: HOSPADM

## 2020-12-17 RX ORDER — SODIUM CHLORIDE 0.9 % (FLUSH) 0.9 %
10 SYRINGE (ML) INJECTION PRN
Status: DISCONTINUED | OUTPATIENT
Start: 2020-12-17 | End: 2020-12-18 | Stop reason: HOSPADM

## 2020-12-17 RX ORDER — SODIUM CHLORIDE 9 MG/ML
INJECTION, SOLUTION INTRAVENOUS CONTINUOUS
Status: DISCONTINUED | OUTPATIENT
Start: 2020-12-17 | End: 2020-12-18 | Stop reason: HOSPADM

## 2020-12-17 NOTE — H&P
CC: Right upper extremity weakness.        Subjective:      History of Present Illness:     Curly Gay is a 62 y.o. patient with a PMH significant for anxiety, asthma, migraine headaches presented with complaints of right upper extremity weakness and dysarthria. Patient came to the hospital after she was started having difficulty using her phone and iPad. Feels her hand to be weak. Also felt her words could not be expressed clearly. She called her daughter although she thought she was calling her . She was brought to the emergency room with strokelike symptoms. UC stroke team was consulted TPA was not recommended.        Past Medical History:   has a past medical history of Anxiety, Asthma, GERD (gastroesophageal reflux disease), Herniated lumbar intervertebral disc, IBS (irritable bowel syndrome), Migraine, Nausea & vomiting, OA (osteoarthritis), Sleep apnea, and UTI (urinary tract infection).     Surgical History:   has a past surgical history that includes Hysterectomy; Endometrial ablation; Tubal ligation; Cholecystectomy, laparoscopic; joint replacement; Breast lumpectomy; Soft Tissue Tumor Resection (4-16-10); other surgical history (6/20/11); Lumbar spine surgery; other surgical history (Right, 09/01/2017); Pain management procedure (Bilateral, 8/27/2020); and Pain management procedure (Bilateral, 10/22/2020).    Social History:   reports that she has never smoked. She has never used smokeless tobacco. She reports current alcohol use. She reports that she does not use drugs.      Family History:  family history includes Cancer in her mother; Diabetes in her sister;  Heart Disease in her father, paternal aunt, and paternal uncle; High Blood Pressure in her brother, father, and sister; Stroke in her father.     Home Medications:  Were reviewed and are listed in nursing record and/or below  Home Medications           Prior to Admission medications Medication Sig Start Date End Date Taking? Authorizing Provider   atorvastatin (LIPITOR) 10 MG tablet Take 10 mg by mouth daily     Yes Historical Provider, MD   sennosides-docusate sodium (SENOKOT-S) 8.6-50 MG tablet Take 1 tablet by mouth daily     Yes Historical Provider, MD   cyclobenzaprine (FLEXERIL) 10 MG tablet Take 10 mg by mouth 3 times daily as needed for Muscle spasms (patient unsure of dose)     Yes Historical Provider, MD   DULoxetine (CYMBALTA) 60 MG extended release capsule   7/22/17   Yes Historical Provider, MD   rOPINIRole (REQUIP) 2 MG tablet Take 3 mg by mouth nightly  9/16/15   Yes Historical Provider, MD   montelukast (SINGULAIR) 10 MG tablet Take 10 mg by mouth nightly.     Yes Historical Provider, MD   omeprazole (PRILOSEC) 20 MG capsule Take 20 mg by mouth nightly.  4/15/10   Yes Historical Provider, MD   albuterol (PROAIR HFA) 108 (90 BASE) MCG/ACT inhaler Inhale 2 puffs into the lungs every 6 hours as needed.       Historical Provider, MD            CURRENT Medications:    Current Meds Link used for Sign Out Report   rOPINIRole (REQUIP) tablet 3 mg, Nightly  pantoprazole (PROTONIX) tablet 40 mg, QAM AC  montelukast (SINGULAIR) tablet 10 mg, Nightly  DULoxetine (CYMBALTA) extended release capsule 60 mg, Daily  cyclobenzaprine (FLEXERIL) tablet 10 mg, TID PRN  albuterol (PROVENTIL) nebulizer solution 2.5 mg, Q4H PRN  sodium chloride flush 0.9 % injection 10 mL, 2 times per day  sodium chloride flush 0.9 % injection 10 mL, PRN  polyethylene glycol (GLYCOLAX) packet 17 g, Daily PRN  promethazine (PHENERGAN) tablet 12.5 mg, Q6H PRN    Or  ondansetron (ZOFRAN) injection 4 mg, Q4H PRN  aspirin EC tablet 81 mg, Daily    Or  aspirin suppository 300 mg, Daily  atorvastatin (LIPITOR) tablet 80 mg, Nightly  acetaminophen (TYLENOL) tablet 650 mg, Q6H PRN  enoxaparin (LOVENOX) injection 40 mg, BID            Allergies: Morphine; Scopolamine; Adhesive tape; Ampicillin; Nitrofurantoin; Percocet [oxycodone-acetaminophen]; Propoxyphene; Vicodin [hydrocodone-acetaminophen]; and Codeine               Review of Systems: SEE HPI   · Constitutional: no unanticipated weight loss. There's been no change in energy level, sleep pattern, or activity level. No fevers, chills. · Eyes: No visual changes or diplopia. No scleral icterus. · ENT: No Headaches, hearing loss or vertigo. No mouth sores or sore throat. · Cardiovascular: No Chest pain, tightness or discomfort. · No Shortness of breath. No Dyspnea on exertion, Orthopnea, Paroxysmal nocturnal dyspnea or breathlessness at rest.  · No Palpitations. · No Syncope ('blackouts', 'faints', 'collapse') or dizziness. · Respiratory: No cough or wheezing, no sputum production. No hematemesis. · Gastrointestinal: No abdominal pain, appetite loss, blood in stools. No change in bowel or bladder habits. · Genitourinary: No dysuria, trouble voiding, or hematuria. · Musculoskeletal:  No gait disturbance, no joint complaints. · Integumentary: No rash or pruritis. · Neurological: Right upper extremity weakness, dysarthria no headache, diplopia, change in muscle strength, numbness or tingling. · Psychiatric: No anxiety or depression. · Endocrine: No temperature intolerance. No excessive thirst, fluid intake, or urination. No tremor. · Hematologic/Lymphatic: No abnormal bruising or bleeding, blood clots or swollen lymph nodes.   · Allergic/Immunologic: No nasal congestion or hives.        Objective:      PHYSICAL EXAM:      Vitals:    12/17/20 1100 12/17/20 1200   BP:  (!) 143/93   Pulse:  99   Resp:  14   Temp:  97.7 °F (36.5 °C)   TempSrc:  Infrared   SpO2:  99%   Weight: (!) 320 lb (145.2 kg)    Height: 5' 8\" (1.727 m)               Vitals:     12/11/20 0807   BP: 138/71   Pulse: 97   Resp: 18   Temp: 97.8 °F (36.6 °C)   SpO2: 99%    Weight: (!) 321 lb 14 oz (146 kg) General Appearance:  Alert, cooperative, no distress, appears stated age. Head:  Normocephalic, without obvious abnormality, atraumatic. Eyes:  Pupils equal and round. No scleral icterus. Mouth: Moist mucosa, no pharyngeal erythema. Nose: Nares normal. No drainage or sinus tenderness. Neck: Supple, symmetrical, trachea midline. No adenopathy. No tenderness/mass/nodules. No carotid bruit or elevated JVD. Lungs:   Respiratory Effort: Normal   Auscultation: Clear to auscultation bilaterally, respirations unlabored. No wheeze, rales   Chest Wall:  No tenderness or deformity. Cardiovascular:     Pulses  Palpation: normal   Ascultation: Regular rate, S1/ S2 normal. No murmur, rub, or gallop. 2+ radial and pedal pulses, symmetric  Carotid  Femoral   Abdomen and Gastrointestinal:   Soft, non-tender, bowel sounds active. Liver and Spleen  Masses   Musculoskeletal: No muscle wasting  Back  Gait   Extremities: Extremities normal, atraumatic. No cyanosis or edema. No cyanosis clubbing         Skin: Inspection and palpation performed, no rashes or lesions. Pysch: Normal mood and affect.  Alert and oriented to time place person   Neurologic: Normal gross motor and sensory exam.       Labs      All labs have been reviewed           Lab Results   Component Value Date     WBC 6.4 12/11/2020     RBC 4.48 12/11/2020     HGB 10.6 12/11/2020     HCT 33.6 12/11/2020     MCV 75.1 12/11/2020     RDW 17.4 12/11/2020      12/11/2020            Lab Results   Component Value Date      12/11/2020     K 4.2 12/11/2020      12/11/2020     CO2 22 12/11/2020     BUN 10 12/11/2020     CREATININE 0.6 12/11/2020     GFRAA >60 12/11/2020     GFRAA 114 06/07/2011     AGRATIO 1.4 02/11/2010     LABGLOM >60 12/11/2020     GLUCOSE 106 12/11/2020     PROT 7.7 02/11/2010     CALCIUM 9.1 12/11/2020     BILITOT 0.30 02/11/2010     ALKPHOS 78 02/11/2010     AST 22 02/11/2010     ALT 18 02/11/2010     No results found for: PTINR        Lab Results   Component Value Date     LABA1C 5.9 12/10/2020            Lab Results   Component Value Date     TROPONINI <0.01 12/10/2020         Cardiac, Vascular and Imaging Data all Personally Reviewed in Detail by Myself       EKG: NSR     Echocardiogram: Summary   A bubble study was performed and shows evidence of right to left shunting   consistent with a patent foramen ovale or atrial septal defect.   Normal LV size & wall motion; EF    60 %. Grade I diastolic dysfunction.   The left atrium is at the upper limits of normal in size.   Mild mitral regurgitation.           Assessment and Plan      ASA 2  Mallampati 2    -Left cerebral hemisphere stroke, weakness RUE  Patent foramen ovale. Recommend transesophageal echocardiogram  Recommend dual antiplatelet therapy. Aggressive risk factor modification lipid therapy. If PFO confirmed will need closure     -HTN: Continue BP control.        Thank you for allowing us to participate in the care of Sloane Rutherford.   Please do not hesitate to contact me if you have any questions.     Kwesi Estevez MD, MPH

## 2020-12-31 ENCOUNTER — TELEPHONE (OUTPATIENT)
Dept: CARDIOLOGY CLINIC | Age: 60
End: 2020-12-31

## 2020-12-31 NOTE — TELEPHONE ENCOUNTER
If the patient is calling in for pre-op clearance and doesn't have all the information needed below in RED,  is to ask the patient to contact the surgeon's office and have them send over the necessary information.       Cardiac Risk Assessment message information:     What type of procedure are you having:  Epidural steroid injection    When is your procedure scheduled for:  Not scheduled     Who is the physician performing your procedure:      Which medications need to be held for your procedure and for how long:    plavix for 7 days and aspirin for 6           Phone Number:  [phone]     Fax number to send the letter:  [phone]    Clinical Staff use:     Cardiologist:  Last Appointment:  Next Appointment:  Are you on any blood thinners:  History of Coronary Artery Disease:  Last stress test:  Last echo:  Device Type and :

## 2021-01-08 NOTE — PATIENT INSTRUCTIONS
Covid test scheduled for 2/1/2021 at 9:35   [address]    PFO closure scheduled for 2/4/2021 at 7:30  Arrive at 6:00  [facility]   [address] [place], [address]    The morning of your procedure you will park in the hospital parking lot and report directly to the cath lab to check in.     Pre-Procedure Instructions   1. You will need to fast for at least 8 hours prior to procedure. No caffeine the morning of.   2. All other medications can be taken in the morning with sips of water. 3. You will need to take 325 mg aspirin the morning of. If you are currently taking 81 mg please take 4 tablets that morning. 4. Do not use any lotions, creams or perfume the morning of procedure. 5. Pre-procedure lab work will need to be completed 5-7 days prior to procedure. 6. Please have a responsible adult to drive you home after procedure. We advise you have someone to stay with you for 24 hours following procedure for precautionary measures. Depending on procedure you may require an overnight stay. 7. Cath lab will provide you with all post procedure instructions. If you have any questions regarding the procedure itself or medications, please call [phone] and ask to speak with a nurse.

## 2021-01-08 NOTE — PROGRESS NOTES
[facility]  Cardiology Consult    Margaret Bishop  1960 January 14, 2021      Reason for Referral: PFO    CC: \"I am okay. \"      Subjective:     History of Present Illness:    Margaret Bishop is a 56 y.o. patient with a PMH significant for anxiety,  Asthma, and migraines. She presented to the emergency room on 12/9/2020 with right upper extremity weakness and dysarthria. Today, she is here for hospital follow up for PFO. She does continue to have tingling in her hands. She has swelling in her left leg. Patient denies exertional chest pain/pressure, dyspnea at rest, [place]A, PND, orthopnea, palpitations, lightheadedness, weight changes, changes in LE edema, and syncope. Past Medical History:   has a past medical history of Anxiety, Asthma, GERD (gastroesophageal reflux disease), Herniated lumbar intervertebral disc, IBS (irritable bowel syndrome), Migraine, Nausea & vomiting, OA (osteoarthritis), Sleep apnea, and UTI (urinary tract infection). Surgical History:   has a past surgical history that includes Hysterectomy; Endometrial ablation; Tubal ligation; Cholecystectomy, laparoscopic; joint replacement; Breast lumpectomy; Soft Tissue Tumor Resection (4-16-10); other surgical history (6/20/11); Lumbar spine surgery; other surgical history (Right, 09/01/2017); Pain management procedure (Bilateral, 8/27/2020); and Pain management procedure (Bilateral, 10/22/2020). Social History:   reports that she has never smoked. She has never used smokeless tobacco. She reports current alcohol use. She reports that she does not use drugs. Family History:  family history includes Cancer in her mother; Diabetes in her sister; Heart Disease in her father, paternal aunt, and paternal uncle; High Blood Pressure in her brother, father, and sister; Stroke in her father.     Home Medications:  Were reviewed and are listed in nursing record and/or below  Prior to Admission medications    Medication Sig Start Date End Date Taking? Authorizing Provider   clopidogrel (PLAVIX) 75 MG tablet Take 1 tablet by mouth daily 12/17/20  Yes Loan Fields MD   aspirin 81 MG EC tablet Take 1 tablet by mouth daily 12/11/20  Yes KEISHA Morelos CNP   atorvastatin (LIPITOR) 80 MG tablet Take 1 tablet by mouth nightly 12/11/20  Yes KEISHA Morelos CNP   sennosides-docusate sodium (SENOKOT-S) 8.6-50 MG tablet Take 1 tablet by mouth daily   Yes Historical Provider, MD   cyclobenzaprine (FLEXERIL) 10 MG tablet Take 10 mg by mouth 3 times daily as needed for Muscle spasms (patient unsure of dose)   Yes Historical Provider, MD   DULoxetine (CYMBALTA) 60 MG extended release capsule  7/22/17  Yes Historical Provider, MD   rOPINIRole (REQUIP) 2 MG tablet Take 3 mg by mouth nightly  9/16/15  Yes Historical Provider, MD   montelukast (SINGULAIR) 10 MG tablet Take 10 mg by mouth nightly. Yes Historical Provider, MD   omeprazole (PRILOSEC) 20 MG capsule Take 20 mg by mouth nightly. 4/15/10  Yes Historical Provider, MD   albuterol (PROAIR HFA) 108 (90 BASE) MCG/ACT inhaler Inhale 2 puffs into the lungs every 6 hours as needed. Yes Historical Provider, MD        CURRENT Medications:  No current facility-administered medications for this visit. Allergies:  Morphine, Scopolamine, Adhesive tape, Ampicillin, Nitrofurantoin, Percocet [oxycodone-acetaminophen], Propoxyphene, Vicodin [hydrocodone-acetaminophen], and Codeine           Review of Systems: SEE HPI   · Constitutional: no unanticipated weight loss. There's been no change in energy level, sleep pattern, or activity level. No fevers, chills. · Eyes: No visual changes or diplopia. No scleral icterus. · ENT: No Headaches, hearing loss or vertigo. No mouth sores or sore throat. · Cardiovascular: No Chest pain, tightness or discomfort.  No Shortness of breath.    No Dyspnea on exertion, Orthopnea, Paroxysmal nocturnal dyspnea or breathlessness at rest.   No Palpitations.  No Syncope ('blackouts', 'faints', 'collapse') or dizziness. · Respiratory: No cough or wheezing, no sputum production. No hematemesis. · Gastrointestinal: No abdominal pain, appetite loss, blood in stools. No change in bowel or bladder habits. · Genitourinary: No dysuria, trouble voiding, or hematuria. · Musculoskeletal:  No gait disturbance, no joint complaints. · Integumentary: No rash or pruritis. · Neurological: No headache, diplopia, change in muscle strength, numbness or tingling. · Psychiatric: No anxiety or depression. · Endocrine: No temperature intolerance. No excessive thirst, fluid intake, or urination. No tremor. · Hematologic/Lymphatic: No abnormal bruising or bleeding, blood clots or swollen lymph nodes. · Allergic/Immunologic: No nasal congestion or hives. Objective:     PHYSICAL EXAM:      Vitals:    01/14/21 1441   BP: 122/78   Pulse: 95   Temp: 98.4 °F (36.9 °C)   SpO2: 99%    Weight: (!) 324 lb 12.8 oz (147.3 kg)       General Appearance:  Alert, cooperative, no distress, appears stated age. Head:  Normocephalic, without obvious abnormality, atraumatic. Eyes:  Pupils equal and round. No scleral icterus. Mouth: Moist mucosa, no pharyngeal erythema. Nose: Nares normal. No drainage or sinus tenderness. Neck: Supple, symmetrical, trachea midline. No adenopathy. No tenderness/mass/nodules. No carotid bruit or elevated JVD. Lungs:   Respiratory Effort: Normal   Auscultation: Clear to auscultation bilaterally, respirations unlabored. No wheeze, rales   Chest Wall:  No tenderness or deformity. Cardiovascular:    Pulses  Palpation: normal   Ascultation: Regular rate, S1/ S2 normal. No murmur, rub, or gallop. 2+ radial and pedal pulses, symmetric  Carotid  Femoral   Abdomen and Gastrointestinal:   Soft, non-tender, bowel sounds active. Liver and Spleen  Masses   Musculoskeletal: No muscle wasting  Back  Gait   Extremities: Extremities normal, atraumatic. No cyanosis or edema. No cyanosis clubbing       Skin: Inspection and palpation performed, no rashes or lesions. Pysch: Normal mood and affect. Alert and oriented to time place person   Neurologic: Normal gross motor and sensory exam.       Labs     All labs have been reviewed    Lab Results   Component Value Date    WBC 6.4 12/11/2020    RBC 4.48 12/11/2020    HGB 10.6 12/11/2020    HCT 33.6 12/11/2020    MCV 75.1 12/11/2020    RDW 17.4 12/11/2020     12/11/2020     Lab Results   Component Value Date     12/11/2020    K 4.2 12/11/2020     12/11/2020    CO2 22 12/11/2020    BUN 10 12/11/2020    CREATININE 0.6 12/11/2020    GFRAA >60 12/11/2020    GFRAA 114 06/07/2011    AGRATIO 1.4 02/11/2010    LABGLOM >60 12/11/2020    GLUCOSE 106 12/11/2020    PROT 7.7 02/11/2010    CALCIUM 9.1 12/11/2020    BILITOT 0.30 02/11/2010    ALKPHOS 78 02/11/2010    AST 22 02/11/2010    ALT 18 02/11/2010     No results found for: PTINR  Lab Results   Component Value Date    LABA1C 5.9 12/10/2020     Lab Results   Component Value Date    TROPONINI <0.01 12/10/2020       Cardiac, Vascular and Imaging Data all Personally Reviewed in Detail by Myself      EKG:     Echocardiogram:   ECHO 12/10/2020  A bubble study was performed and shows evidence of right to left shunting consistent with a patent foramen ovale or atrial septal defect. Normal LV size & wall motion; EF   60 %. Grade I diastolic dysfunction. The left atrium is at the upper limits of normal in size. Mild mitral regurgitation. Stress Test:     Cath: Other imaging:   MITALI 12/17/2020  Ejection fraction is visually estimated to be 55-60%. Patent foramen ovale was visualized. Positive bubble study with right to left atria level shunt seen. Assessment and Plan     PFO  TIA per neurology when she was seen on 12/10/2020. Neurology recommend PFO closure. Will order MRI. Unable to get MRI during hospitalization due to spinal cord stimulator.      TIA  With motor and sensory deficits of the right upper and lower extremity. We will get an MRI brain. PFO closure strongly recommended as she has no other reasons for TIA. This was also recommended by neurology. Preoperative risk assessment  Planning to have injection in back. Okay to hold Plavix and Aspirin if needed for procedure. Follow up in 2 months. Thank you for allowing us to participate in the care of Lisa Vick. Please do not hesitate to contact me if you have any questions. Cait James MD, MPH    [address] [place] [facility] [address]  Ph: [phone]  Fax: [phone]    This note was scribed in the presence of Dr Loyda Quesada, by Renaldo Oliveira RN  Physician Attestation:  The scribes documentation has been prepared under my direction and personally reviewed by me in its entirety. I confirm that the note above accurately reflects all work, treatment, procedures, and medical decision making performed by me.

## 2021-01-08 NOTE — TELEPHONE ENCOUNTER
Covid test scheduled for 2/1/2021 at 9:35   [address]    PFO closure scheduled for 2/4/2021 at 7:30  Arrive at 6:00  [facility]   [address]. [place], [address]    The morning of your procedure you will park in the hospital parking lot and report directly to the cath lab to check in.     Pre-Procedure Instructions   1. You will need to fast for at least 8 hours prior to procedure. No caffeine the morning of.   2. All other medications can be taken in the morning with sips of water. 3. You will need to take 325 mg aspirin the morning of. If you are currently taking 81 mg please take 4 tablets that morning. 4. Do not use any lotions, creams or perfume the morning of procedure. 5. Pre-procedure lab work will need to be completed 5-7 days prior to procedure. 6. Please have a responsible adult to drive you home after procedure. We advise you have someone to stay with you for 24 hours following procedure for precautionary measures. Depending on procedure you may require an overnight stay. 7. Cath lab will provide you with all post procedure instructions. If you have any questions regarding the procedure itself or medications, please call [phone] and ask to speak with a nurse. Spoke with Boni in the cath lab at [facility] and patient information. Placed on Qgenda. Will place on AVS for 1/14/2021.

## 2021-01-13 ENCOUNTER — TELEPHONE (OUTPATIENT)
Dept: CARDIOLOGY CLINIC | Age: 61
End: 2021-01-13

## 2021-01-13 NOTE — TELEPHONE ENCOUNTER
Patient is scheduled to have PFO on 2/4 with Dr. Rivera Dickson and Dr. Tejinder Pandya. After sending the insurance company two hospital consults,MITALI and Echo results, the insurance company has denied the authorization request.  They are willing to give the physician the opportunity to call and speak to a physician reviewer in regards to this case. A nurse practitioner or physician can call. Please call [phone] and refer to case #[id number]. If authorization is obtained, please provide.  Thanks

## 2021-01-14 ENCOUNTER — OFFICE VISIT (OUTPATIENT)
Dept: CARDIOLOGY CLINIC | Age: 61
End: 2021-01-14
Payer: COMMERCIAL

## 2021-01-14 VITALS
HEIGHT: 68 IN | WEIGHT: 293 LBS | OXYGEN SATURATION: 99 % | DIASTOLIC BLOOD PRESSURE: 78 MMHG | BODY MASS INDEX: 44.41 KG/M2 | HEART RATE: 95 BPM | SYSTOLIC BLOOD PRESSURE: 122 MMHG | TEMPERATURE: 98.4 F

## 2021-01-14 DIAGNOSIS — Q21.12 PFO (PATENT FORAMEN OVALE): ICD-10-CM

## 2021-01-14 DIAGNOSIS — G45.9 TIA (TRANSIENT ISCHEMIC ATTACK): Primary | ICD-10-CM

## 2021-01-14 PROCEDURE — 93000 ELECTROCARDIOGRAM COMPLETE: CPT | Performed by: INTERNAL MEDICINE

## 2021-01-14 PROCEDURE — 99213 OFFICE O/P EST LOW 20 MIN: CPT | Performed by: INTERNAL MEDICINE

## 2021-01-20 NOTE — TELEPHONE ENCOUNTER
Yes I will discuss with him. Patient is getting an MRI tomorrow. Will submitting the MRI help get approval without the peer to peer?

## 2021-01-21 ENCOUNTER — TELEPHONE (OUTPATIENT)
Dept: CARDIOLOGY CLINIC | Age: 61
End: 2021-01-21

## 2021-01-21 ENCOUNTER — HOSPITAL ENCOUNTER (OUTPATIENT)
Age: 61
Setting detail: OUTPATIENT SURGERY
Discharge: HOME | End: 2021-01-21
Attending: PAIN MEDICINE | Admitting: PAIN MEDICINE
Payer: COMMERCIAL

## 2021-01-21 VITALS
OXYGEN SATURATION: 100 % | WEIGHT: 293 LBS | BODY MASS INDEX: 44.41 KG/M2 | HEART RATE: 85 BPM | HEIGHT: 68 IN | TEMPERATURE: 97.7 F | SYSTOLIC BLOOD PRESSURE: 167 MMHG | RESPIRATION RATE: 16 BRPM | DIASTOLIC BLOOD PRESSURE: 96 MMHG

## 2021-01-21 PROCEDURE — 2500000003 HC RX 250 WO HCPCS: Performed by: PAIN MEDICINE

## 2021-01-21 PROCEDURE — 3600000002 HC SURGERY LEVEL 2 BASE: Performed by: PAIN MEDICINE

## 2021-01-21 PROCEDURE — 6360000004 HC RX CONTRAST MEDICATION: Performed by: PAIN MEDICINE

## 2021-01-21 PROCEDURE — 6360000002 HC RX W HCPCS: Performed by: PAIN MEDICINE

## 2021-01-21 PROCEDURE — 64483 NJX AA&/STRD TFRM EPI L/S 1: CPT | Performed by: PAIN MEDICINE

## 2021-01-21 PROCEDURE — 2709999900 HC NON-CHARGEABLE SUPPLY: Performed by: PAIN MEDICINE

## 2021-01-21 PROCEDURE — 7100000010 HC PHASE II RECOVERY - FIRST 15 MIN: Performed by: PAIN MEDICINE

## 2021-01-21 RX ORDER — LIDOCAINE HYDROCHLORIDE 10 MG/ML
INJECTION, SOLUTION EPIDURAL; INFILTRATION; INTRACAUDAL; PERINEURAL PRN
Status: DISCONTINUED | OUTPATIENT
Start: 2021-01-21 | End: 2021-01-21 | Stop reason: ALTCHOICE

## 2021-01-21 RX ORDER — BETAMETHASONE SODIUM PHOSPHATE AND BETAMETHASONE ACETATE 3; 3 MG/ML; MG/ML
INJECTION, SUSPENSION INTRA-ARTICULAR; INTRALESIONAL; INTRAMUSCULAR; SOFT TISSUE
Status: DISCONTINUED
Start: 2021-01-21 | End: 2021-01-21 | Stop reason: HOSPADM

## 2021-01-21 ASSESSMENT — PAIN - FUNCTIONAL ASSESSMENT: PAIN_FUNCTIONAL_ASSESSMENT: 0-10

## 2021-01-21 NOTE — TELEPHONE ENCOUNTER
Received call from the  that patients spinal stimulator is not MRI compatible. She completed a CT of the head 12/10/2020. Will make Dr Glorious Olszewski aware.

## 2021-01-21 NOTE — TELEPHONE ENCOUNTER
Called and was told that I could call back and no appointment is needed so I will call back this afternoon.

## 2021-01-21 NOTE — PROCEDURES
Corinne Snider is a 56 y.o. female patient. No diagnosis found. Past Medical History:   Diagnosis Date    Anxiety     Asthma     low O 2 SATS POST-OP    Cancer (HCC)     melanoma    GERD (gastroesophageal reflux disease)     Herniated lumbar intervertebral disc     l4    IBS (irritable bowel syndrome)     Migraine     Nausea & vomiting     OA (osteoarthritis)     Sleep apnea     PT STATES \"PROBABLE\"    UTI (urinary tract infection)     FREQUENT     Blood pressure (!) 155/76, pulse 86, temperature 97.7 °F (36.5 °C), temperature source Temporal, resp. rate 18, height 5' 8\" (1.727 m), weight (!) 324 lb (147 kg), SpO2 100 %. Procedures    Kiera Bryan MD  1/21/2021  L S1             TRANSFORAMINAL EPIDURAL INJECTION  [id number] and [id number]  with [id number],     INDICATIONS:  Lumbar Radiculitis 724.4, M54.17  OPERATIVE PROCEDURE:  Consent was signed by the patient after the risks and benefits were explained. With the patient in the prone position, the back was prepped with Prevail and draped. Aseptic technique was used at every stage of the procedure. Next, after sterile prep and local infiltration a _22__ -gauge _3.5__ -inch needle was placed on the _L__-sided S1 foramen. Aspiration was negative for CSF or blood . Injection of Omnipaque dye (     0.5    cc) showed appropriate spread into the epidural space and also along the nerve root. _2__ cc of _1__ % /Lidocaine and _80__ mg of DEPOMEDROL was injected. The needle was pulled out intact. The patient tolerated the procedure well and discharge instructions were given.     ESTIMATED BLOOD LOSS:  Less than 1 cc      Pulse Ox Monitoring was done.               ________________________________                   Kalpesh Hanna M.D.

## 2021-01-21 NOTE — TELEPHONE ENCOUNTER
Not once it goes to Denial status. The physician would have to speak to them at this point to try to get it overturned.

## 2021-01-21 NOTE — TELEPHONE ENCOUNTER
Attempted to contact, on hold for 1 hour and 48 minutes then was told by an automated message I would need to call back.

## 2021-01-25 NOTE — TELEPHONE ENCOUNTER
Email received from Tamra Nava. Case has been initiate but likely , will need to follow appeals process. Discussed with Dr. Jairo Wynn, he states okay to proceed with a written appeal and the procedure will need cancelled so the appeal can be submitted. Notified Mara Villasenor and [address] [place] [address] lab. Procedure cancelled on qgenda. Notified Yogesh Moon that the procedure will be cancelled and an appeal letter will need to be sent. She v/u, covid testing cancelled also.

## 2021-01-25 NOTE — TELEPHONE ENCOUNTER
Dr. Cecille Sanabria,     Patient sent a [employer] message inquiring about her PFO closure that was scheduled on 2/4/21 but is now cancelled. See prior message about not able to get an MRI due to spinal cord stimulator and incompatibility. Rosalind Casarez notes that the patient had a CT scan last month with further discussions with you to advise? Please let us know & thanks.

## 2021-01-25 NOTE — TELEPHONE ENCOUNTER
Called and spoke with Emily Thomas at the number listed below for the peer to peer. She states that there is no case initiated for the PFO, they have no order for this procedure. She states they only have an order/case number for the MRI and echo. The PFO will have to have a case initated since there is nothing listed for the PFO closure that is scheduled for 2/4/21. Once the case is started it may not require a peer to peer but she cannot be sure until they receive all the information.

## 2021-02-12 ENCOUNTER — TELEPHONE (OUTPATIENT)
Dept: CARDIOLOGY CLINIC | Age: 61
End: 2021-02-12

## 2021-03-12 ENCOUNTER — TELEPHONE (OUTPATIENT)
Dept: CARDIOLOGY CLINIC | Age: 61
End: 2021-03-12

## 2021-03-12 RX ORDER — CLOPIDOGREL BISULFATE 75 MG/1
75 TABLET ORAL DAILY
Qty: 30 TABLET | Refills: 3 | Status: SHIPPED | OUTPATIENT
Start: 2021-03-12 | End: 2021-03-15 | Stop reason: SDUPTHER

## 2021-03-12 NOTE — TELEPHONE ENCOUNTER
Appeal letter and all documentation sent in the mail today for insurance to review for PFO closure. Called and spoke to patient and let her know that we will update once an update was provided as we get it. She verbalized understanding.

## 2021-03-15 ENCOUNTER — TELEPHONE (OUTPATIENT)
Dept: CARDIOLOGY CLINIC | Age: 61
End: 2021-03-15

## 2021-03-15 NOTE — TELEPHONE ENCOUNTER
[facility] from [employer] called in this afternoon stating the insurance is requesting for Teresas plavix they do a 90 day instead of a 30 day. You can reach [facility] at [phone]  Ref.  # [phone]

## 2021-03-15 NOTE — PROGRESS NOTES
[facility]  Cardiology Consult    Gilford Sera  1960 March 16, 2021      Reason for Referral: PFO    CC: \"I am okay. \"      Subjective:     History of Present Illness:    Gilford Sera is a 56 y.o. patient with a PMH significant for anxiety,  Asthma, and migraines. She presented to the emergency room on 12/9/2020 with right upper extremity weakness and dysarthria. Today, she is here for follow up for PFO. She has not had a closure at yet due to insurance issues. She does still get the occasional migraine and dizziness. She continues to have back pain and getting injections. Patient denies exertional chest pain/pressure, dyspnea at rest, CHINCHILLA, PND, orthopnea, palpitations,  weight changes, changes in LE edema, and syncope. Patient reports compliance to her medications. Past Medical History:   has a past medical history of Anxiety, Asthma, Cancer ([address] GERD (gastroesophageal reflux disease), Herniated lumbar intervertebral disc, IBS (irritable bowel syndrome), Migraine, Nausea & vomiting, OA (osteoarthritis), Sleep apnea, and UTI (urinary tract infection). Surgical History:   has a past surgical history that includes Hysterectomy; Endometrial ablation; Tubal ligation; Cholecystectomy, laparoscopic; joint replacement; Breast lumpectomy; Soft Tissue Tumor Resection (4-16-10); other surgical history (6/20/11); Lumbar spine surgery; other surgical history (Right, 09/01/2017); Pain management procedure (Bilateral, 8/27/2020); Pain management procedure (Bilateral, 10/22/2020); and Pain management procedure (Left, 1/21/2021). Social History:   reports that she has never smoked. She has never used smokeless tobacco. She reports current alcohol use. She reports that she does not use drugs. Family History:  family history includes Cancer in her mother; Diabetes in her sister;  Heart Disease in her father, paternal aunt, and paternal uncle; High Blood Pressure in her brother, Chest pain, tightness or discomfort.  No Shortness of breath. No Dyspnea on exertion, Orthopnea, Paroxysmal nocturnal dyspnea or breathlessness at rest.   No Palpitations.  No Syncope ('blackouts', 'faints', 'collapse') or dizziness. · Respiratory: No cough or wheezing, no sputum production. No hematemesis. · Gastrointestinal: No abdominal pain, appetite loss, blood in stools. No change in bowel or bladder habits. · Genitourinary: No dysuria, trouble voiding, or hematuria. · Musculoskeletal:  No gait disturbance, no joint complaints. · Integumentary: No rash or pruritis. · Neurological: No headache, diplopia, change in muscle strength, numbness or tingling. · Psychiatric: No anxiety or depression. · Endocrine: No temperature intolerance. No excessive thirst, fluid intake, or urination. No tremor. · Hematologic/Lymphatic: No abnormal bruising or bleeding, blood clots or swollen lymph nodes. · Allergic/Immunologic: No nasal congestion or hives. Objective:     PHYSICAL EXAM:      Vitals:    03/16/21 0838   BP: 132/78   Pulse: 96   Temp: 97.7 °F (36.5 °C)   SpO2: 99%    Weight: (!) 314 lb 9.6 oz (142.7 kg)       General Appearance:  Alert, cooperative, no distress, appears stated age. Head:  Normocephalic, without obvious abnormality, atraumatic. Eyes:  Pupils equal and round. No scleral icterus. Mouth: Moist mucosa, no pharyngeal erythema. Nose: Nares normal. No drainage or sinus tenderness. Neck: Supple, symmetrical, trachea midline. No adenopathy. No tenderness/mass/nodules. No carotid bruit or elevated JVD. Lungs:   Respiratory Effort: Normal   Auscultation: Clear to auscultation bilaterally, respirations unlabored. No wheeze, rales   Chest Wall:  No tenderness or deformity. Cardiovascular:    Pulses  Palpation: normal   Ascultation: Regular rate, S1/ S2 normal. No murmur, rub, or gallop.   2+ radial and pedal pulses, symmetric  Carotid  Femoral   Abdomen and Gastrointestinal: Soft, non-tender, bowel sounds active. Liver and Spleen  Masses   Musculoskeletal: No muscle wasting  Back  Gait   Extremities: Extremities normal, atraumatic. No cyanosis or edema. No cyanosis clubbing       Skin: Inspection and palpation performed, no rashes or lesions. Pysch: Normal mood and affect. Alert and oriented to time place person   Neurologic: Normal gross motor and sensory exam.       Labs     All labs have been reviewed    Lab Results   Component Value Date    WBC 6.4 12/11/2020    RBC 4.48 12/11/2020    HGB 10.6 12/11/2020    HCT 33.6 12/11/2020    MCV 75.1 12/11/2020    RDW 17.4 12/11/2020     12/11/2020     Lab Results   Component Value Date     12/11/2020    K 4.2 12/11/2020     12/11/2020    CO2 22 12/11/2020    BUN 10 12/11/2020    CREATININE 0.6 12/11/2020    GFRAA >60 12/11/2020    GFRAA 114 06/07/2011    AGRATIO 1.4 02/11/2010    LABGLOM >60 12/11/2020    GLUCOSE 106 12/11/2020    PROT 7.7 02/11/2010    CALCIUM 9.1 12/11/2020    BILITOT 0.30 02/11/2010    ALKPHOS 78 02/11/2010    AST 22 02/11/2010    ALT 18 02/11/2010     No results found for: PTINR  Lab Results   Component Value Date    LABA1C 5.9 12/10/2020     Lab Results   Component Value Date    TROPONINI <0.01 12/10/2020       Cardiac, Vascular and Imaging Data all Personally Reviewed in Detail by Myself      EKG:     Echocardiogram:   ECHO 12/10/2020  A bubble study was performed and shows evidence of right to left shunting consistent with a patent foramen ovale or atrial septal defect. Normal LV size & wall motion; EF   60 %. Grade I diastolic dysfunction. The left atrium is at the upper limits of normal in size. Mild mitral regurgitation. Stress Test:     Cath: Other imaging:   MITALI 12/17/2020  Ejection fraction is visually estimated to be 55-60%. Patent foramen ovale was visualized. Positive bubble study with right to left atria level shunt seen.     Assessment and Plan     PFO  TIA per neurology when she was seen on 12/10/2020. Neurology recommend PFO closure. Continues to have symptoms with migraines and dizziness. TIA  With motor and sensory deficits of the right upper and lower extremity. PFO closure strongly recommended as she has no other reasons for TIA. This was also recommended by neurology. Follow up in 6 months. Thank you for allowing us to participate in the care of Richard Madrigal. Please do not hesitate to contact me if you have any questions. Kayy Mckeon MD, MPH    [address] [place] [address]  Ph: [phone]  Fax: [phone]    This note was scribed in the presence of Dr Jennifer Lua, by Elida Anderson RN  Physician Attestation:  The scribes documentation has been prepared under my direction and personally reviewed by me in its entirety. I confirm that the note above accurately reflects all work, treatment, procedures, and medical decision making performed by me.

## 2021-03-16 ENCOUNTER — OFFICE VISIT (OUTPATIENT)
Dept: CARDIOLOGY CLINIC | Age: 61
End: 2021-03-16
Payer: COMMERCIAL

## 2021-03-16 VITALS
WEIGHT: 293 LBS | HEIGHT: 68 IN | TEMPERATURE: 97.7 F | HEART RATE: 96 BPM | OXYGEN SATURATION: 99 % | SYSTOLIC BLOOD PRESSURE: 132 MMHG | DIASTOLIC BLOOD PRESSURE: 78 MMHG | BODY MASS INDEX: 44.41 KG/M2

## 2021-03-16 DIAGNOSIS — G45.9 TIA (TRANSIENT ISCHEMIC ATTACK): ICD-10-CM

## 2021-03-16 DIAGNOSIS — Q21.12 PFO (PATENT FORAMEN OVALE): Primary | ICD-10-CM

## 2021-03-16 PROCEDURE — 99213 OFFICE O/P EST LOW 20 MIN: CPT | Performed by: INTERNAL MEDICINE

## 2021-03-16 RX ORDER — CLOPIDOGREL BISULFATE 75 MG/1
75 TABLET ORAL DAILY
Qty: 90 TABLET | Refills: 3 | Status: ON HOLD | OUTPATIENT
Start: 2021-03-16 | End: 2022-05-03 | Stop reason: HOSPADM

## 2021-03-16 NOTE — PATIENT INSTRUCTIONS
good for your heart. · Choose low-fat or fat-free milk and dairy products. Eat foods high in fiber  · Eat a variety of grain products every day. Include whole-grain foods that have lots of fiber and nutrients. Examples of whole-grain foods include oats, whole wheat bread, and brown rice. · Buy whole-grain breads and cereals, instead of white bread or pastries. Limit salt and sodium  · Limit how much salt and sodium you eat to help lower your blood pressure. · Taste food before you salt it. Add only a little salt when you think you need it. With time, your taste buds will adjust to less salt. · Eat fewer snack items, fast foods, and other high-salt, processed foods. Check food labels for the amount of sodium in packaged foods. · Choose low-sodium versions of canned goods (such as soups, vegetables, and beans). Limit sugar  · Limit drinks and foods with added sugar. These include candy, desserts, and soda pop. Limit alcohol  · Limit alcohol to no more than 2 drinks a day for men and 1 drink a day for women. Too much alcohol can cause health problems. When should you call for help? Watch closely for changes in your health, and be sure to contact your doctor if:    · You would like help planning heart-healthy meals. Where can you learn more? Go to https://StarMobile.servtag. org and sign in to your imgfave account. Enter V137 in the [facility] box to learn more about \"Heart-Healthy Diet: Care Instructions. \"     If you do not have an account, please click on the \"Sign Up Now\" link. Current as of: December 17, 2020               Content Version: 12.8  © 1565-6342 [employer]wise, [employer]. Care instructions adapted under license by [facility] ([facility]). If you have questions about a medical condition or this instruction, always ask your healthcare professional. [address] any warranty or liability for your use of this information.

## 2021-04-20 ENCOUNTER — TELEPHONE (OUTPATIENT)
Dept: CARDIOLOGY CLINIC | Age: 61
End: 2021-04-20

## 2021-04-30 ENCOUNTER — TELEPHONE (OUTPATIENT)
Dept: CARDIOLOGY CLINIC | Age: 61
End: 2021-04-30

## 2021-04-30 NOTE — TELEPHONE ENCOUNTER
Dr. Cabrera Yeung, please make preop recommendations. Pt currently taking plavix and ASA. Hx PFO. and TIA. Thank you.

## 2021-04-30 NOTE — TELEPHONE ENCOUNTER
Cardiac Clearance Request    Procedure: Colonoscopy with possible biopsy and/or polypectomy  Cardiologist:Dr. Jonathan Wilson  Last Appointment:3/16/2021  Next Appointment:9/20/2021  How long do the blood thinners need to be held?: Plavix, 7 days prior  Cardiac History: CVA, PFO    No date scheduled for procedure yet.     Dr. Farida Conway  Phone: [phone]  Fax: [phone]

## 2021-09-02 PROBLEM — G45.9 TIA (TRANSIENT ISCHEMIC ATTACK): Status: ACTIVE | Noted: 2021-09-02

## 2021-10-27 NOTE — PROGRESS NOTES
[address]  Cardiology Consult    Zoraida Alexander  1960 October 29, 2021      Reason for Referral: PFO    CC: \"I have increased swelling. \"      Subjective:     History of Present Illness:    Zoraida Alexander is a 56 y.o. patient with a PMH significant for anxiety,  Asthma, and migraines. She presented to the emergency room on 12/9/2020 with right upper extremity weakness and dysarthria. Today, she is here to discuss PFO again. She has been having increased swelling in her bilateral lower extremities. She says that she cannot wear compression stockings due to an allergy that she has. She is currently not on diuretic therapy. Patient denies exertional chest pain/pressure, dyspnea at rest, CHINCHILLA, PND, orthopnea, palpitations, lightheadedness, weight changes, and syncope. Past Medical History:   has a past medical history of Anxiety, Asthma, Cancer ([address] GERD (gastroesophageal reflux disease), Herniated lumbar intervertebral disc, IBS (irritable bowel syndrome), Migraine, Nausea & vomiting, OA (osteoarthritis), Sleep apnea, and UTI (urinary tract infection). Surgical History:   has a past surgical history that includes Hysterectomy; Endometrial ablation; Tubal ligation; Cholecystectomy, laparoscopic; joint replacement; Breast lumpectomy; Soft Tissue Tumor Resection (4-16-10); other surgical history (6/20/11); Lumbar spine surgery; other surgical history (Right, 09/01/2017); Pain management procedure (Bilateral, 8/27/2020); Pain management procedure (Bilateral, 10/22/2020); and Pain management procedure (Left, 1/21/2021). Social History:   reports that she has never smoked. She has never used smokeless tobacco. She reports current alcohol use. She reports that she does not use drugs. Family History:  family history includes Cancer in her mother; Diabetes in her sister;  Heart Disease in her father, paternal aunt, and paternal uncle; High Blood Pressure in her brother, father, and sister; Stroke in her father. Home Medications:  Were reviewed and are listed in nursing record and/or below  Prior to Admission medications    Medication Sig Start Date End Date Taking? Authorizing Provider   clopidogrel (PLAVIX) 75 MG tablet Take 1 tablet by mouth daily 3/16/21  Yes Grant Clements MD   aspirin 81 MG EC tablet Take 1 tablet by mouth daily 12/11/20  [facility]   atorvastatin (LIPITOR) 80 MG tablet Take 1 tablet by mouth nightly 12/11/20  Yes [facility] APRTITI - CNP   sennosides-docusate sodium (SENOKOT-S) 8.6-50 MG tablet Take 1 tablet by mouth daily   Yes Historical Provider, MD   cyclobenzaprine (FLEXERIL) 10 MG tablet Take 10 mg by mouth 3 times daily as needed for Muscle spasms (patient unsure of dose)   Yes Historical Provider, MD   DULoxetine (CYMBALTA) 60 MG extended release capsule  7/22/17  Yes Historical Provider, MD   rOPINIRole (REQUIP) 2 MG tablet Take 3 mg by mouth nightly  9/16/15  Yes Historical Provider, MD   montelukast (SINGULAIR) 10 MG tablet Take 10 mg by mouth nightly. Yes Historical Provider, MD   omeprazole (PRILOSEC) 20 MG capsule Take 20 mg by mouth nightly. 4/15/10  Yes Historical Provider, MD   albuterol (PROAIR HFA) 108 (90 BASE) MCG/ACT inhaler Inhale 2 puffs into the lungs every 6 hours as needed. Yes Historical Provider, MD        CURRENT Medications:  No current facility-administered medications for this visit. Allergies:  Morphine, Scopolamine, Adhesive tape, Ampicillin, Nitrofurantoin, Percocet [oxycodone-acetaminophen], Propoxyphene, Vicodin [hydrocodone-acetaminophen], and Codeine           Review of Systems: SEE HPI   · Constitutional: no unanticipated weight loss. There's been no change in energy level, sleep pattern, or activity level. No fevers, chills. · Eyes: No visual changes or diplopia. No scleral icterus. · ENT: No Headaches, hearing loss or vertigo. No mouth sores or sore throat.   · Cardiovascular: No Chest pain, tightness or discomfort.  No Shortness of breath. No Dyspnea on exertion, Orthopnea, Paroxysmal nocturnal dyspnea or breathlessness at rest.   No Palpitations.  No Syncope ('blackouts', 'faints', 'collapse') or dizziness. · Respiratory: No cough or wheezing, no sputum production. No hematemesis. · Gastrointestinal: No abdominal pain, appetite loss, blood in stools. No change in bowel or bladder habits. · Genitourinary: No dysuria, trouble voiding, or hematuria. · Musculoskeletal:  No gait disturbance, no joint complaints. · Integumentary: No rash or pruritis. · Neurological: No headache, diplopia, change in muscle strength, numbness or tingling. · Psychiatric: No anxiety or depression. · Endocrine: No temperature intolerance. No excessive thirst, fluid intake, or urination. No tremor. · Hematologic/Lymphatic: No abnormal bruising or bleeding, blood clots or swollen lymph nodes. · Allergic/Immunologic: No nasal congestion or hives. Objective:     PHYSICAL EXAM:      Vitals:    10/29/21 0833   BP: (!) 140/84   Pulse:    SpO2:     Weight: (!) 334 lb (151.5 kg)       General Appearance:  Alert, cooperative, no distress, appears stated age. Head:  Normocephalic, without obvious abnormality, atraumatic. Eyes:  Pupils equal and round. No scleral icterus. Mouth: Moist mucosa, no pharyngeal erythema. Nose: Nares normal. No drainage or sinus tenderness. Neck: Supple, symmetrical, trachea midline. No adenopathy. No tenderness/mass/nodules. No carotid bruit or elevated JVD. Lungs:   Respiratory Effort: Normal   Auscultation: Clear to auscultation bilaterally, respirations unlabored. No wheeze, rales   Chest Wall:  No tenderness or deformity. Cardiovascular:    Pulses  Palpation: normal   Ascultation: Regular rate, S1/ S2 normal. No murmur, rub, or gallop.   2+ radial and pedal pulses, symmetric  Carotid  Femoral   Abdomen and Gastrointestinal:   Soft, non-tender, bowel sounds active. Liver and Spleen  Masses   Musculoskeletal: No muscle wasting  Back  Gait   Extremities: Extremities normal, atraumatic. No cyanosis or edema. No cyanosis clubbing       Skin: Inspection and palpation performed, no rashes or lesions. Pysch: Normal mood and affect. Alert and oriented to time place person   Neurologic: Normal gross motor and sensory exam.       Labs     All labs have been reviewed    Lab Results   Component Value Date    WBC 6.4 12/11/2020    RBC 4.48 12/11/2020    HGB 10.6 12/11/2020    HCT 33.6 12/11/2020    MCV 75.1 12/11/2020    RDW 17.4 12/11/2020     12/11/2020     Lab Results   Component Value Date     12/11/2020    K 4.2 12/11/2020     12/11/2020    CO2 22 12/11/2020    BUN 10 12/11/2020    CREATININE 0.6 12/11/2020    GFRAA >60 12/11/2020    GFRAA 114 06/07/2011    AGRATIO 1.4 02/11/2010    LABGLOM >60 12/11/2020    GLUCOSE 106 12/11/2020    PROT 7.7 02/11/2010    CALCIUM 9.1 12/11/2020    BILITOT 0.30 02/11/2010    ALKPHOS 78 02/11/2010    AST 22 02/11/2010    ALT 18 02/11/2010     No results found for: PTINR  Lab Results   Component Value Date    LABA1C 5.9 12/10/2020     Lab Results   Component Value Date    TROPONINI <0.01 12/10/2020       Cardiac, Vascular and Imaging Data all Personally Reviewed in Detail by Myself      EKG:     Echocardiogram:   ECHO 12/10/2020  A bubble study was performed and shows evidence of right to left shunting consistent with a patent foramen ovale or atrial septal defect. Normal LV size & wall motion; EF   60 %. Grade I diastolic dysfunction. The left atrium is at the upper limits of normal in size. Mild mitral regurgitation. Stress Test:     Cath: Other imaging:   MITALI 12/17/2020  Ejection fraction is visually estimated to be 55-60%. Patent foramen ovale was visualized. Positive bubble study with right to left atria level shunt seen. Assessment and Plan     PFO  TIA per neurology when she was seen on 12/10/2020. Neurology recommend PFO closure. Continues to have symptoms with migraines and dizziness. Will work on approval process for PFO closure. TIA  With motor and sensory deficits of the right upper and lower extremity. PFO closure strongly recommended as she has no other reasons for TIA. This was also recommended by neurology. Bilateral lower extremity edema  Unable to tolerate compression stockings due to allergy. Will possibly be a candidate for lymphapress. Initiate Lasix 40 mg daily. Repeat BMP in 10 days. Follow up in 6 months. Thank you for allowing us to participate in the care of Heidi Perez. Please do not hesitate to contact me if you have any questions. Tahira Mckeon MD, MPH    [facility], [address] [place] [address] [facility]  Ph: [phone]  Fax: [phone]    This note was scribed in the presence of Dr Jennifer Mccloud, by [place] RN  Physician Attestation:  The scribes documentation has been prepared under my direction and personally reviewed by me in its entirety. I confirm that the note above accurately reflects all work, treatment, procedures, and medical decision making performed by me.

## 2021-10-29 ENCOUNTER — OFFICE VISIT (OUTPATIENT)
Dept: CARDIOLOGY CLINIC | Age: 61
End: 2021-10-29
Payer: COMMERCIAL

## 2021-10-29 ENCOUNTER — TELEPHONE (OUTPATIENT)
Dept: CARDIOLOGY CLINIC | Age: 61
End: 2021-10-29

## 2021-10-29 VITALS
HEART RATE: 111 BPM | DIASTOLIC BLOOD PRESSURE: 84 MMHG | BODY MASS INDEX: 44.41 KG/M2 | SYSTOLIC BLOOD PRESSURE: 140 MMHG | WEIGHT: 293 LBS | OXYGEN SATURATION: 96 % | HEIGHT: 68 IN

## 2021-10-29 DIAGNOSIS — Z01.818 PRE-OP TESTING: Primary | ICD-10-CM

## 2021-10-29 DIAGNOSIS — G45.9 TIA (TRANSIENT ISCHEMIC ATTACK): ICD-10-CM

## 2021-10-29 DIAGNOSIS — R60.0 BILATERAL LOWER EXTREMITY EDEMA: ICD-10-CM

## 2021-10-29 DIAGNOSIS — Q21.12 PFO (PATENT FORAMEN OVALE): Primary | ICD-10-CM

## 2021-10-29 PROCEDURE — 99214 OFFICE O/P EST MOD 30 MIN: CPT | Performed by: INTERNAL MEDICINE

## 2021-10-29 RX ORDER — FUROSEMIDE 40 MG/1
40 TABLET ORAL DAILY
Qty: 30 TABLET | Refills: 5 | Status: SHIPPED | OUTPATIENT
Start: 2021-10-29 | End: 2022-01-31 | Stop reason: SDUPTHER

## 2021-10-29 NOTE — PATIENT INSTRUCTIONS
Patient Education        Low Sodium Diet (2,000 Milligram): Care Instructions  Overview     Limiting sodium can be an important part of managing some health problems. The most common source of sodium is salt. People get most of the salt in their diet from canned, prepared, and packaged foods. Fast food and restaurant meals also are very high in sodium. Your doctor will probably limit your sodium to less than 2,000 milligrams (mg) a day. This limit counts all the sodium in prepared and packaged foods and any salt you add to your food. Follow-up care is a key part of your treatment and safety. Be sure to make and go to all appointments, and call your doctor if you are having problems. It's also a good idea to know your test results and keep a list of the medicines you take. How can you care for yourself at home? Read food labels  · Read labels on cans and food packages. The labels tell you how much sodium is in each serving. Make sure that you look at the serving size. If you eat more than the serving size, you have eaten more sodium. · Food labels also tell you the Percent Daily Value for sodium. Choose products with low Percent Daily Values for sodium. · Be aware that sodium can come in forms other than salt, including monosodium glutamate (MSG), sodium citrate, and sodium bicarbonate (baking soda). MSG is often added to Asian food. When you eat out, you can sometimes ask for food without MSG or added salt. Buy low-sodium foods  · Buy foods that are labeled \"unsalted\" (no salt added), \"sodium-free\" (less than 5 mg of sodium per serving), or \"low-sodium\" (140 mg or less of sodium per serving). Foods labeled \"reduced-sodium\" and \"light sodium\" may still have too much sodium. Be sure to read the label to see how much sodium you are getting. · Buy fresh vegetables, or frozen vegetables without added sauces. Buy low-sodium versions of canned vegetables, soups, and other canned goods.   Prepare low-sodium meals  · Cut back on the amount of salt you use in cooking. This will help you adjust to the taste. Do not add salt after cooking. One teaspoon of salt has about 2,300 mg of sodium. · Take the salt shaker off the table. · Flavor your food with garlic, lemon juice, onion, vinegar, herbs, and spices. Do not use soy sauce, lite soy sauce, steak sauce, onion salt, garlic salt, celery salt, or ketchup on your food. · Use low-sodium salad dressings, sauces, and ketchup. Or make your own salad dressings and sauces without adding salt. · Use less salt (or none) when recipes call for it. You can often use half the salt a recipe calls for without losing flavor. Other foods such as rice, pasta, and grains do not need added salt. · Rinse canned vegetables, and cook them in fresh water. This removes somebut not allof the salt. · Avoid water that is naturally high in sodium or that has been treated with water softeners, which add sodium. If you buy bottled water, read the label and choose a sodium-free brand. Avoid high-sodium foods  · Avoid eating:  ? Smoked, cured, salted, and canned meat, fish, and poultry. ? Ham, gallardo, hot dogs, and luncheon meats. ? Regular, hard, and processed cheese and regular peanut butter. ? Crackers with salted tops, and other salted snack foods such as pretzels, chips, and salted popcorn. ? Frozen prepared meals, unless labeled low-sodium. ? Canned and dried soups, broths, and bouillon, unless labeled sodium-free or low-sodium. ? Canned vegetables, unless labeled sodium-free or low-sodium. ? [place] fries, pizza, tacos, and other fast foods. ? Pickles, olives, ketchup, and other condiments, especially soy sauce, unless labeled sodium-free or low-sodium. Where can you learn more? Go to https://eddie.[facility]. org and sign in to your [employer] account. Enter C308 in the [facility] box to learn more about \"Low Sodium Diet (2,000 Milligram): Care Instructions. \" If you do not have an account, please click on the \"Sign Up Now\" link. Current as of: December 17, 2020               Content Version: 13.0  © 5601-3352 [employer], Incorporated. Care instructions adapted under license by [facility] ([facility]). If you have questions about a medical condition or this instruction, always ask your healthcare professional. [address] any warranty or liability for your use of this information.

## 2021-11-02 NOTE — TELEPHONE ENCOUNTER
PFO scheduled for 12/2/2021 at 8:30  Arrive at [facility]   [address], [address]      The morning of your procedure you will park in the hospital parking lot and report directly to the cath lab to check in.     Pre-Procedure Instructions   1. You will need to fast for at least 8 hours prior to procedure. No caffeine the morning of.   2. Hold your diuretic, Lasix the morning of procedure. 3. All other medications can be taken in the morning with sips of water. 4. You will need to take 325 mg aspirin the morning of. If you are currently taking 81 mg please take 4 tablets that morning. 5. Do not use any lotions, creams or perfume the morning of procedure. 6. Pre-procedure lab work will need to be completed 5-7 days prior to procedure. 7. Please have a responsible adult to drive you home after procedure. We advise you have someone to stay with you for 24 hours following procedure for precautionary measures. Depending on procedure you may require an overnight stay. 8. Cath lab will provide you with all post procedure instructions. If you have any questions regarding the procedure itself or medications, please call [phone] and ask to speak with a nurse.

## 2021-11-03 NOTE — TELEPHONE ENCOUNTER
Called patient and she is agreeable to date and time. Reviewed instructions and she verbalized understanding. LEESAgenda updated and email sent.

## 2021-11-26 ENCOUNTER — HOSPITAL ENCOUNTER (OUTPATIENT)
Age: 61
Discharge: HOME | End: 2021-11-26
Payer: COMMERCIAL

## 2021-11-26 DIAGNOSIS — R60.0 BILATERAL LOWER EXTREMITY EDEMA: ICD-10-CM

## 2021-11-26 DIAGNOSIS — Z01.818 PRE-OP TESTING: ICD-10-CM

## 2021-11-26 LAB
ANION GAP SERPL CALCULATED.3IONS-SCNC: 14 MMOL/L (ref 3–16)
BUN BLDV-MCNC: 10 MG/DL (ref 7–20)
CALCIUM SERPL-MCNC: 9 MG/DL (ref 8.3–10.6)
CHLORIDE BLD-SCNC: 98 MMOL/L (ref 99–110)
CO2: 23 MMOL/L (ref 21–32)
CREAT SERPL-MCNC: 0.7 MG/DL (ref 0.6–1.2)
GFR AFRICAN AMERICAN: >60
GFR NON-AFRICAN AMERICAN: >60
GLUCOSE BLD-MCNC: 112 MG/DL (ref 70–99)
POTASSIUM SERPL-SCNC: 3.8 MMOL/L (ref 3.5–5.1)
SARS-COV-2, NAAT: NOT DETECTED
SODIUM BLD-SCNC: 135 MMOL/L (ref 136–145)

## 2021-11-26 PROCEDURE — 87635 SARS-COV-2 COVID-19 AMP PRB: CPT

## 2021-11-26 PROCEDURE — 80048 BASIC METABOLIC PNL TOTAL CA: CPT

## 2021-11-26 PROCEDURE — 36415 COLL VENOUS BLD VENIPUNCTURE: CPT

## 2021-12-01 NOTE — PRE-PROCEDURE INSTRUCTIONS
Called patient about procedure. Told to be here at 0700 for procedure at 0830. NPO after midnight, but can take morning medication with sips of water, patient stated they {are on blood thinners. To have a responsible adult be with patient take them home and stay with them afterwards, if they do not get admitted to [address] [place] [address]. And if available bring current list of medications. No other questions or concerns.

## 2021-12-02 ENCOUNTER — HOSPITAL ENCOUNTER (OUTPATIENT)
Dept: INTERVENTIONAL RADIOLOGY/VASCULAR | Age: 61
Discharge: HOME | End: 2021-12-02
Payer: COMMERCIAL

## 2021-12-02 VITALS — TEMPERATURE: 98.2 F | BODY MASS INDEX: 44.41 KG/M2 | WEIGHT: 293 LBS | HEIGHT: 68 IN

## 2021-12-02 LAB
BACTERIA: ABNORMAL /HPF
BILIRUBIN URINE: ABNORMAL
BLOOD, URINE: NEGATIVE
CLARITY: CLEAR
COLOR: YELLOW
EPITHELIAL CELLS, UA: ABNORMAL /HPF (ref 0–5)
GLUCOSE URINE: NEGATIVE MG/DL
HCT VFR BLD CALC: 38.2 % (ref 36–48)
HEMOGLOBIN: 12.1 G/DL (ref 12–16)
INR BLD: 1.03 (ref 0.88–1.12)
KETONES, URINE: NEGATIVE MG/DL
LEUKOCYTE ESTERASE, URINE: ABNORMAL
MCH RBC QN AUTO: 23.5 PG (ref 26–34)
MCHC RBC AUTO-ENTMCNC: 31.7 G/DL (ref 31–36)
MCV RBC AUTO: 74.1 FL (ref 80–100)
MICROSCOPIC EXAMINATION: YES
NITRITE, URINE: NEGATIVE
PDW BLD-RTO: 18.6 % (ref 12.4–15.4)
PH UA: 5.5 (ref 5–8)
PLATELET # BLD: 307 K/UL (ref 135–450)
PMV BLD AUTO: 8.8 FL (ref 5–10.5)
POC ACT LR: 169 SEC
POC ACT LR: 237 SEC
POC ACT LR: 237 SEC
PROTEIN UA: ABNORMAL MG/DL
PROTHROMBIN TIME: 11.6 SEC (ref 9.9–12.7)
RBC # BLD: 5.16 M/UL (ref 4–5.2)
RBC UA: ABNORMAL /HPF (ref 0–4)
SPECIFIC GRAVITY UA: >=1.03 (ref 1–1.03)
URINE TYPE: ABNORMAL
UROBILINOGEN, URINE: 1 E.U./DL
WBC # BLD: 8.2 K/UL (ref 4–11)
WBC UA: ABNORMAL /HPF (ref 0–5)

## 2021-12-02 PROCEDURE — 85347 COAGULATION TIME ACTIVATED: CPT

## 2021-12-02 PROCEDURE — 99153 MOD SED SAME PHYS/QHP EA: CPT

## 2021-12-02 PROCEDURE — 85610 PROTHROMBIN TIME: CPT

## 2021-12-02 PROCEDURE — 93662 INTRACARDIAC ECG (ICE): CPT

## 2021-12-02 PROCEDURE — 99152 MOD SED SAME PHYS/QHP 5/>YRS: CPT

## 2021-12-02 PROCEDURE — C1894 INTRO/SHEATH, NON-LASER: HCPCS

## 2021-12-02 PROCEDURE — 93325 DOPPLER ECHO COLOR FLOW MAPG: CPT

## 2021-12-02 PROCEDURE — 81001 URINALYSIS AUTO W/SCOPE: CPT

## 2021-12-02 PROCEDURE — C8923 2D TTE W OR W/O FOL W/CON,CO: HCPCS

## 2021-12-02 PROCEDURE — 85027 COMPLETE CBC AUTOMATED: CPT

## 2021-12-02 PROCEDURE — C1769 GUIDE WIRE: HCPCS

## 2021-12-02 PROCEDURE — 2709999900 HC NON-CHARGEABLE SUPPLY

## 2021-12-02 PROCEDURE — C1817 SEPTAL DEFECT IMP SYS: HCPCS

## 2021-12-02 PROCEDURE — 93580 TRANSCATH CLOSURE OF ASD: CPT | Performed by: INTERNAL MEDICINE

## 2021-12-02 PROCEDURE — 93662 INTRACARDIAC ECG (ICE): CPT | Performed by: INTERNAL MEDICINE

## 2021-12-02 PROCEDURE — 93580 TRANSCATH CLOSURE OF ASD: CPT

## 2021-12-02 PROCEDURE — C1759 CATH, INTRA ECHOCARDIOGRAPHY: HCPCS

## 2021-12-02 RX ORDER — SODIUM CHLORIDE 9 MG/ML
INJECTION, SOLUTION INTRAVENOUS CONTINUOUS
Status: DISCONTINUED | OUTPATIENT
Start: 2021-12-02 | End: 2021-12-03 | Stop reason: HOSPADM

## 2021-12-02 RX ORDER — ONDANSETRON 2 MG/ML
4 INJECTION INTRAMUSCULAR; INTRAVENOUS EVERY 6 HOURS PRN
Status: DISCONTINUED | OUTPATIENT
Start: 2021-12-02 | End: 2021-12-03 | Stop reason: HOSPADM

## 2021-12-02 RX ORDER — SODIUM CHLORIDE 0.9 % (FLUSH) 0.9 %
5-40 SYRINGE (ML) INJECTION PRN
Status: DISCONTINUED | OUTPATIENT
Start: 2021-12-02 | End: 2021-12-03 | Stop reason: HOSPADM

## 2021-12-02 RX ORDER — SODIUM CHLORIDE 0.9 % (FLUSH) 0.9 %
5-40 SYRINGE (ML) INJECTION EVERY 12 HOURS SCHEDULED
Status: DISCONTINUED | OUTPATIENT
Start: 2021-12-02 | End: 2021-12-03 | Stop reason: HOSPADM

## 2021-12-02 RX ORDER — SODIUM CHLORIDE 9 MG/ML
25 INJECTION, SOLUTION INTRAVENOUS PRN
Status: DISCONTINUED | OUTPATIENT
Start: 2021-12-02 | End: 2021-12-03 | Stop reason: HOSPADM

## 2021-12-02 RX ORDER — CEFAZOLIN SODIUM 1 G/3ML
1000 INJECTION, POWDER, FOR SOLUTION INTRAMUSCULAR; INTRAVENOUS ONCE
Status: DISCONTINUED | OUTPATIENT
Start: 2021-12-02 | End: 2021-12-02 | Stop reason: SDUPTHER

## 2021-12-02 RX ORDER — ACETAMINOPHEN 325 MG/1
650 TABLET ORAL EVERY 4 HOURS PRN
Status: DISCONTINUED | OUTPATIENT
Start: 2021-12-02 | End: 2021-12-03 | Stop reason: HOSPADM

## 2021-12-02 NOTE — PROCEDURES
Jonathan Holm  56 y.o.  [id number]    Procedure:  PFO Closure    Indication:  Recurrent TIA    After informed consent was obtained and history and exam reviewed, the patient was taken to the cardiac cath lab. The patient had both groins prepped and draped in sterile fashion. 1% Xylocaine was used to anesthetize the right groin. A needle was inserted into the right femoral vein and a wire was inserted. A second venipuncture was performed and a second wire was inserted. A long 9 Fr sheath was inserted in the inferior site. A 6 Fr sheath was inserted into superior site. Continuous pulse-oximetry and ECG monitoring was performed, and frequent blood pressure assessment was performed. An independent trained observer pushed medications at my direction. We monitored the patient's level of consciousness and vital signs/physiologic status throughout the procedure duration (see start and stop times below). An intracardiac echocardiogram (ICE) was performed evaluating the atria, valvular structures, the aortic valve, and the atrial septum (done via the long 9 Fr sheath). Next, a J wire was inserted through a 5 Fr MP catheter in the 6 Fr sheath. The J wire was placed in a left pulmonary vein. The MP catheter was advanced into the pulmonary vein. Heparin was administered to maintain an ACT over 250 seconds. The J wire was then exchanged for an exchange-length Amplatz wire. The catheter and sheath were removed. A 9 Fr Torqvue delivery sheath was then inserted. When the sheath was across the atrial septal defect, the dilator and Amplatz wire were removed. The sheath was flushed. Under ICE and fluoroscopic guidance, a 28 Mm PFO closure device was inserted through the Torqvue sheath into the left atrium. The left atrial disk was deployed. The sheath/device combination was then withdrawn such that the left atrial disk approximated the atrial septum. The right atrial disk was then deployed.  We then interrogated the device and defect with ICE. Post-deployment, a \"wiggle test\" was done under ICE and fluoroscopic monitoring to confirm device stability. The delivery cable was detached and removed. Further assessment with ICE was done, including a bubble study as necessary. The ICE catheter was then removed. Sheaths were flushed and removed. Short sheaths were exchanged in for the longer/delivery sheaths. Hemostasis was obtained by manual pressure (once ACT dropped to appropriate level). Complications: None     EBL: 25 ml      ICE Findings: Moderate sized PFO    Conclusions:     Successful closure of PFO     Recommendations:   -Continue ASA (indefinitely) and plavix (at least 6 months)   -Antiobiotic prophylaxis for 1 year   -Echo tomorrow, 1 month, 6 months, and 1 year.     Debi Araiza MD

## 2021-12-02 NOTE — H&P
Reason for Referral: PFO     CC: \"I have increased swelling. \"        Subjective:      History of Present Illness:     Odell Marsh is a 56 y.o. patient with a PMH significant for anxiety,  Asthma, and migraines. She presented to the emergency room on 12/9/2020 with right upper extremity weakness and dysarthria. Today, she is here to discuss PFO again. She has been having increased swelling in her bilateral lower extremities. She says that she cannot wear compression stockings due to an allergy that she has. She is currently not on diuretic therapy. Patient denies exertional chest pain/pressure, dyspnea at rest, CH[place]ILLA, PND, orthopnea, palpitations, lightheadedness, weight changes, and syncope.      Past Medical History:   has a past medical history of Anxiety, Asthma, Cancer ([address] GERD (gastroesophageal reflux disease), Herniated lumbar intervertebral disc, IBS (irritable bowel syndrome), Migraine, Nausea & vomiting, OA (osteoarthritis), Sleep apnea, and UTI (urinary tract infection).     Surgical History:   has a past surgical history that includes Hysterectomy; Endometrial ablation; Tubal ligation; Cholecystectomy, laparoscopic; joint replacement; Breast lumpectomy; Soft Tissue Tumor Resection (4-16-10); other surgical history (6/20/11); Lumbar spine surgery; other surgical history (Right, 09/01/2017); Pain management procedure (Bilateral, 8/27/2020); Pain management procedure (Bilateral, 10/22/2020); and Pain management procedure (Left, 1/21/2021).    Social History:   reports that she has never smoked. She has never used smokeless tobacco. She reports current alcohol use. She reports that she does not use drugs.      Family History:  family history includes Cancer in her mother; Diabetes in her sister;  Heart Disease in her father, paternal aunt, and paternal uncle; High Blood Pressure in her brother, father, and sister; Stroke in her father.     Home Medications:  Were reviewed and are listed in nursing record and/or below  Home Medications           Prior to Admission medications    Medication Sig Start Date End Date Taking? Authorizing Provider   clopidogrel (PLAVIX) 75 MG tablet Take 1 tablet by mouth daily 3/16/21   Yes Kimber Clements MD   aspirin 81 MG EC tablet Take 1 tablet by mouth daily 12/11/20   Yes Daniel Riedel, APRN - CNP   atorvastatin (LIPITOR) 80 MG tablet Take 1 tablet by mouth nightly 12/11/20   Yes Daniel Riedel, APRN - CNP   sennosides-docusate sodium (SENOKOT-S) 8.6-50 MG tablet Take 1 tablet by mouth daily     Yes Historical Provider, MD   cyclobenzaprine (FLEXERIL) 10 MG tablet Take 10 mg by mouth 3 times daily as needed for Muscle spasms (patient unsure of dose)     Yes Historical Provider, MD   DULoxetine (CYMBALTA) 60 MG extended release capsule   7/22/17   Yes Historical Provider, MD   rOPINIRole (REQUIP) 2 MG tablet Take 3 mg by mouth nightly  9/16/15   Yes Historical Provider, MD   montelukast (SINGULAIR) 10 MG tablet Take 10 mg by mouth nightly.     Yes Historical Provider, MD   omeprazole (PRILOSEC) 20 MG capsule Take 20 mg by mouth nightly. 4/15/10   Yes Historical Provider, MD   albuterol (PROAIR HFA) 108 (90 BASE) MCG/ACT inhaler Inhale 2 puffs into the lungs every 6 hours as needed.     Yes Historical Provider, MD            CURRENT Medications:    Current Meds Link used for Sign Out Report   No current facility-administered medications for this visit.         Allergies:  Morphine, Scopolamine, Adhesive tape, Ampicillin, Nitrofurantoin, Percocet [oxycodone-acetaminophen], Propoxyphene, Vicodin [hydrocodone-acetaminophen], and Codeine               Review of Systems: SEE HPI   · Constitutional: no unanticipated weight loss. There's been no change in energy level, sleep pattern, or activity level. No fevers, chills. · Eyes: No visual changes or diplopia. No scleral icterus. · ENT: No Headaches, hearing loss or vertigo. No mouth sores or sore throat.   · Cardiovascular: No Chest pain, tightness or discomfort. · No Shortness of breath. No Dyspnea on exertion, Orthopnea, Paroxysmal nocturnal dyspnea or breathlessness at rest.  · No Palpitations. · No Syncope ('blackouts', 'faints', 'collapse') or dizziness. · Respiratory: No cough or wheezing, no sputum production. No hematemesis. · Gastrointestinal: No abdominal pain, appetite loss, blood in stools. No change in bowel or bladder habits. · Genitourinary: No dysuria, trouble voiding, or hematuria. · Musculoskeletal:  No gait disturbance, no joint complaints. · Integumentary: No rash or pruritis. · Neurological: No headache, diplopia, change in muscle strength, numbness or tingling. · Psychiatric: No anxiety or depression. · Endocrine: No temperature intolerance. No excessive thirst, fluid intake, or urination. No tremor. · Hematologic/Lymphatic: No abnormal bruising or bleeding, blood clots or swollen lymph nodes. · Allergic/Immunologic: No nasal congestion or hives.        Objective:      PHYSICAL EXAM:           Vitals:     10/29/21 0833   BP: (!) 140/84   Pulse:     SpO2:      Weight: (!) 334 lb (151.5 kg)       General Appearance:  Alert, cooperative, no distress, appears stated age. Head:  Normocephalic, without obvious abnormality, atraumatic. Eyes:  Pupils equal and round. No scleral icterus. Mouth: Moist mucosa, no pharyngeal erythema. Nose: Nares normal. No drainage or sinus tenderness. Neck: Supple, symmetrical, trachea midline. No adenopathy. No tenderness/mass/nodules. No carotid bruit or elevated JVD. Lungs:   Respiratory Effort: Normal   Auscultation: Clear to auscultation bilaterally, respirations unlabored. No wheeze, rales   Chest Wall:  No tenderness or deformity. Cardiovascular:     Pulses  Palpation: normal   Ascultation: Regular rate, S1/ S2 normal. No murmur, rub, or gallop.   2+ radial and pedal pulses, symmetric  Carotid  Femoral   Abdomen and Gastrointestinal:   Soft, non-tender, bowel sounds active. Liver and Spleen  Masses   Musculoskeletal: No muscle wasting  Back  Gait   Extremities: Extremities normal, atraumatic. No cyanosis or edema. No cyanosis clubbing         Skin: Inspection and palpation performed, no rashes or lesions. Pysch: Normal mood and affect. Alert and oriented to time place person   Neurologic: Normal gross motor and sensory exam.       Labs      All labs have been reviewed           Lab Results   Component Value Date     WBC 6.4 12/11/2020     RBC 4.48 12/11/2020     HGB 10.6 12/11/2020     HCT 33.6 12/11/2020     MCV 75.1 12/11/2020     RDW 17.4 12/11/2020      12/11/2020            Lab Results   Component Value Date      12/11/2020     K 4.2 12/11/2020      12/11/2020     CO2 22 12/11/2020     BUN 10 12/11/2020     CREATININE 0.6 12/11/2020     GFRAA >60 12/11/2020     GFRAA 114 06/07/2011     AGRATIO 1.4 02/11/2010     LABGLOM >60 12/11/2020     GLUCOSE 106 12/11/2020     PROT 7.7 02/11/2010     CALCIUM 9.1 12/11/2020     BILITOT 0.30 02/11/2010     ALKPHOS 78 02/11/2010     AST 22 02/11/2010     ALT 18 02/11/2010      No results found for: PTINR        Lab Results   Component Value Date     LABA1C 5.9 12/10/2020            Lab Results   Component Value Date     TROPONINI <0.01 12/10/2020         Cardiac, Vascular and Imaging Data all Personally Reviewed in Detail by Myself       EKG:      Echocardiogram:   ECHO 12/10/2020  A bubble study was performed and shows evidence of right to left shunting consistent with a patent foramen ovale or atrial septal defect. Normal LV size & wall motion; EF   60 %. Grade I diastolic dysfunction. The left atrium is at the upper limits of normal in size. Mild mitral regurgitation.     Stress Test:      Cath:     Other imaging:   MITALI 12/17/2020  Ejection fraction is visually estimated to be 55-60%. Patent foramen ovale was visualized.   Positive bubble study with right to left atria level shunt seen.     Assessment and Plan      PFO  TIA per neurology when she was seen on 12/10/2020. Neurology recommend PFO closure. Continues to have symptoms with migraines and dizziness. Will work on approval process for PFO closure.      TIA  With motor and sensory deficits of the right upper and lower extremity. PFO closure strongly recommended as she has no other reasons for TIA. This was also recommended by neurology.     Bilateral lower extremity edema  Unable to tolerate compression stockings due to allergy. Will possibly be a candidate for lymphapress. Initiate Lasix 40 mg daily. Repeat BMP in 10 days.      I had a detail discussion with the patient and the family members regarding the risk and benefit of the procedures. I explained to them the details of the procedure. I explained to them that the procedure will be performed using moderate sedation and local anaesthesia using lidocaine. I explained any risk of bleeding, perforation of the vessel, stroke, myocardial infarction or death. Also explained to the patient need for any emergent open heart surgery and CABG to save the patient's life. I have presented reasonable alternatives to the patient's proposed care, treatment, and services. The discussion I have done encompassed risks, benefits, and side effects related to the alternatives and the risks related to not receiving the proposed care, treatment, and services. The patient and the family members understood the risk and benefits and the details of procedure and would like to go ahead with the procedure. The patient gave informed consent.     Thank you for allowing us to participate in the care of James Ron.   Please do not hesitate to contact me if you have any questions.     Radha Bernal MD, MPH     [address] [place] [facility]  Ph: [phone]  Fax: [phone]

## 2021-12-10 NOTE — PROGRESS NOTES
[address]  Cardiology Consult    Tolu Morejon  1960 December 13, 2021      Reason for Referral: PFO    CC: \"I am recovering well. \"      Subjective:     History of Present Illness:    Tolu Morejon is a 56 y.o. patient with a PMH significant for anxiety,  Asthma, and migraines. She presented to the emergency room on 12/9/2020 with right upper extremity weakness and dysarthria. Today, she is here s/p PFO closure. She states that her groin is healing well. She continues to have swelling in her bilateral lower extremities. She is unable to wear compression stockings due to an allergy. Past Medical History:   has a past medical history of Anxiety, Asthma, Cancer ([address] GERD (gastroesophageal reflux disease), Herniated lumbar intervertebral disc, IBS (irritable bowel syndrome), Migraine, Nausea & vomiting, OA (osteoarthritis), Sleep apnea, and UTI (urinary tract infection). Surgical History:   has a past surgical history that includes Hysterectomy; Endometrial ablation; Tubal ligation; Cholecystectomy, laparoscopic; joint replacement; Breast lumpectomy; Soft Tissue Tumor Resection (4-16-10); other surgical history (6/20/11); Lumbar spine surgery; other surgical history (Right, 09/01/2017); Pain management procedure (Bilateral, 8/27/2020); Pain management procedure (Bilateral, 10/22/2020); and Pain management procedure (Left, 1/21/2021). Social History:   reports that she has never smoked. She has never used smokeless tobacco. She reports current alcohol use. She reports that she does not use drugs. Family History:  family history includes Cancer in her mother; Diabetes in her sister; Heart Disease in her father, paternal aunt, and paternal uncle; High Blood Pressure in her brother, father, and sister; Stroke in her father.     Home Medications:  Were reviewed and are listed in nursing record and/or below  Prior to Admission medications    Medication Sig Start Date End Date Taking? Authorizing Provider   VITAMIN D PO Take by mouth daily   Yes Historical Provider, MD   Ferrous Sulfate (IRON PO) Take by mouth daily   Yes Historical Provider, MD   furosemide (LASIX) 40 MG tablet Take 1 tablet by mouth daily 10/29/21  Yes Tahira Mckeon MD   clopidogrel (PLAVIX) 75 MG tablet Take 1 tablet by mouth daily 3/16/21  Yes Tahira Mckeon MD   aspirin 81 MG EC tablet Take 1 tablet by mouth daily 12/11/20  Yes KEISHA Irving CNP   atorvastatin (LIPITOR) 80 MG tablet Take 1 tablet by mouth nightly 12/11/20  Yes KEISHA Irving CNP   sennosides-docusate sodium (SENOKOT-S) 8.6-50 MG tablet Take 1 tablet by mouth daily   Yes Historical Provider, MD   cyclobenzaprine (FLEXERIL) 10 MG tablet Take 10 mg by mouth 3 times daily as needed for Muscle spasms (patient unsure of dose)   Yes Historical Provider, MD   DULoxetine (CYMBALTA) 60 MG extended release capsule Take 60 mg by mouth daily  7/22/17  Yes Historical Provider, MD   rOPINIRole (REQUIP) 2 MG tablet Take 3 mg by mouth nightly  9/16/15  Yes Historical Provider, MD   montelukast (SINGULAIR) 10 MG tablet Take 10 mg by mouth nightly. Yes Historical Provider, MD   omeprazole (PRILOSEC) 20 MG capsule Take 20 mg by mouth nightly. 4/15/10  Yes Historical Provider, MD   albuterol (PROAIR HFA) 108 (90 BASE) MCG/ACT inhaler Inhale 2 puffs into the lungs every 6 hours as needed. Yes Historical Provider, MD        CURRENT Medications:  No current facility-administered medications for this visit. Allergies:  Morphine, Scopolamine, Adhesive tape, Ampicillin, Nitrofurantoin, Percocet [oxycodone-acetaminophen], Propoxyphene, Vicodin [hydrocodone-acetaminophen], and Codeine           Review of Systems: SEE HPI   · Constitutional: no unanticipated weight loss. There's been no change in energy level, sleep pattern, or activity level. No fevers, chills. · Eyes: No visual changes or diplopia. No scleral icterus.   · ENT: No Headaches, hearing symmetric  Carotid  Femoral   Abdomen and Gastrointestinal:   Soft, non-tender, bowel sounds active. Liver and Spleen  Masses   Musculoskeletal: No muscle wasting  Back  Gait   Extremities: Extremities normal, atraumatic. No cyanosis or edema. No cyanosis clubbing       Skin: Inspection and palpation performed, no rashes or lesions. Pysch: Normal mood and affect. Alert and oriented to time place person   Neurologic: Normal gross motor and sensory exam.       Labs     All labs have been reviewed    Lab Results   Component Value Date    WBC 8.2 12/02/2021    RBC 5.16 12/02/2021    HGB 12.1 12/02/2021    HCT 38.2 12/02/2021    MCV 74.1 12/02/2021    RDW 18.6 12/02/2021     12/02/2021     Lab Results   Component Value Date     11/26/2021    K 3.8 11/26/2021    K 4.2 12/11/2020    CL 98 11/26/2021    CO2 23 11/26/2021    BUN 10 11/26/2021    CREATININE 0.7 11/26/2021    GFRAA >60 11/26/2021    GFRAA 114 06/07/2011    AGRATIO 1.4 02/11/2010    LABGLOM >60 11/26/2021    GLUCOSE 112 11/26/2021    PROT 7.7 02/11/2010    CALCIUM 9.0 11/26/2021    BILITOT 0.30 02/11/2010    ALKPHOS 78 02/11/2010    AST 22 02/11/2010    ALT 18 02/11/2010     No results found for: PTINR  Lab Results   Component Value Date    LABA1C 5.9 12/10/2020     Lab Results   Component Value Date    TROPONINI <0.01 12/10/2020       Cardiac, Vascular and Imaging Data all Personally Reviewed in Detail by Myself      EKG:     Echocardiogram:   ECHO 12/10/2020  A bubble study was performed and shows evidence of right to left shunting consistent with a patent foramen ovale or atrial septal defect. Normal LV size & wall motion; EF   60 %. Grade I diastolic dysfunction. The left atrium is at the upper limits of normal in size. Mild mitral regurgitation. ECHO (limited) 12/2/2021  Normal left ventricular size. Moderate concentric left ventricular  hypertrophy.   Normal left ventricular systolic function with an estimated ejection  fraction of

## 2021-12-13 ENCOUNTER — OFFICE VISIT (OUTPATIENT)
Dept: CARDIOLOGY CLINIC | Age: 61
End: 2021-12-13
Payer: COMMERCIAL

## 2021-12-13 VITALS
BODY MASS INDEX: 44.41 KG/M2 | HEART RATE: 104 BPM | WEIGHT: 293 LBS | OXYGEN SATURATION: 97 % | DIASTOLIC BLOOD PRESSURE: 84 MMHG | HEIGHT: 68 IN | SYSTOLIC BLOOD PRESSURE: 136 MMHG

## 2021-12-13 DIAGNOSIS — G45.9 TIA (TRANSIENT ISCHEMIC ATTACK): ICD-10-CM

## 2021-12-13 DIAGNOSIS — R60.0 BILATERAL LOWER EXTREMITY EDEMA: ICD-10-CM

## 2021-12-13 DIAGNOSIS — Q21.12 PFO (PATENT FORAMEN OVALE): Primary | ICD-10-CM

## 2021-12-13 PROCEDURE — 99213 OFFICE O/P EST LOW 20 MIN: CPT | Performed by: INTERNAL MEDICINE

## 2021-12-13 NOTE — PATIENT INSTRUCTIONS

## 2021-12-15 ENCOUNTER — CLINICAL DOCUMENTATION (OUTPATIENT)
Dept: OTHER | Age: 61
End: 2021-12-15

## 2021-12-20 ENCOUNTER — APPOINTMENT (OUTPATIENT)
Dept: CT IMAGING | Age: 61
DRG: 125 | End: 2021-12-20
Payer: COMMERCIAL

## 2021-12-20 ENCOUNTER — HOSPITAL ENCOUNTER (INPATIENT)
Age: 61
LOS: 3 days | Discharge: HOME | DRG: 125 | End: 2021-12-23
Attending: EMERGENCY MEDICINE | Admitting: STUDENT IN AN ORGANIZED HEALTH CARE EDUCATION/TRAINING PROGRAM
Payer: COMMERCIAL

## 2021-12-20 DIAGNOSIS — H54.62 VISION LOSS OF LEFT EYE: Primary | ICD-10-CM

## 2021-12-20 DIAGNOSIS — R51.9 LEFT TEMPORAL HEADACHE: ICD-10-CM

## 2021-12-20 LAB
ANION GAP SERPL CALCULATED.3IONS-SCNC: 9 MMOL/L (ref 3–16)
BASOPHILS ABSOLUTE: 0.1 K/UL (ref 0–0.2)
BASOPHILS RELATIVE PERCENT: 0.9 %
BUN BLDV-MCNC: 9 MG/DL (ref 7–20)
C-REACTIVE PROTEIN: 8.2 MG/L (ref 0–5.1)
CALCIUM SERPL-MCNC: 9.5 MG/DL (ref 8.3–10.6)
CHLORIDE BLD-SCNC: 102 MMOL/L (ref 99–110)
CO2: 25 MMOL/L (ref 21–32)
CREAT SERPL-MCNC: 0.6 MG/DL (ref 0.6–1.2)
EOSINOPHILS ABSOLUTE: 0.2 K/UL (ref 0–0.6)
EOSINOPHILS RELATIVE PERCENT: 3.4 %
GFR AFRICAN AMERICAN: >60
GFR NON-AFRICAN AMERICAN: >60
GLUCOSE BLD-MCNC: 93 MG/DL (ref 70–99)
HCT VFR BLD CALC: 39.4 % (ref 36–48)
HEMOGLOBIN: 12.2 G/DL (ref 12–16)
LYMPHOCYTES ABSOLUTE: 1.5 K/UL (ref 1–5.1)
LYMPHOCYTES RELATIVE PERCENT: 23.8 %
MCH RBC QN AUTO: 23.1 PG (ref 26–34)
MCHC RBC AUTO-ENTMCNC: 30.9 G/DL (ref 31–36)
MCV RBC AUTO: 74.7 FL (ref 80–100)
MONOCYTES ABSOLUTE: 0.5 K/UL (ref 0–1.3)
MONOCYTES RELATIVE PERCENT: 8.3 %
NEUTROPHILS ABSOLUTE: 4.1 K/UL (ref 1.7–7.7)
NEUTROPHILS RELATIVE PERCENT: 63.6 %
PDW BLD-RTO: 18.1 % (ref 12.4–15.4)
PLATELET # BLD: 251 K/UL (ref 135–450)
PMV BLD AUTO: 8.7 FL (ref 5–10.5)
POTASSIUM REFLEX MAGNESIUM: 4.2 MMOL/L (ref 3.5–5.1)
RBC # BLD: 5.28 M/UL (ref 4–5.2)
SEDIMENTATION RATE, ERYTHROCYTE: 26 MM/HR (ref 0–30)
SODIUM BLD-SCNC: 136 MMOL/L (ref 136–145)
WBC # BLD: 6.5 K/UL (ref 4–11)

## 2021-12-20 PROCEDURE — 70496 CT ANGIOGRAPHY HEAD: CPT

## 2021-12-20 PROCEDURE — 1200000000 HC SEMI PRIVATE

## 2021-12-20 PROCEDURE — 2580000003 HC RX 258: Performed by: NURSE PRACTITIONER

## 2021-12-20 PROCEDURE — 6360000004 HC RX CONTRAST MEDICATION: Performed by: EMERGENCY MEDICINE

## 2021-12-20 PROCEDURE — 36415 COLL VENOUS BLD VENIPUNCTURE: CPT

## 2021-12-20 PROCEDURE — 86140 C-REACTIVE PROTEIN: CPT

## 2021-12-20 PROCEDURE — 70450 CT HEAD/BRAIN W/O DYE: CPT

## 2021-12-20 PROCEDURE — 6360000002 HC RX W HCPCS: Performed by: EMERGENCY MEDICINE

## 2021-12-20 PROCEDURE — 85025 COMPLETE CBC W/AUTO DIFF WBC: CPT

## 2021-12-20 PROCEDURE — 80048 BASIC METABOLIC PNL TOTAL CA: CPT

## 2021-12-20 PROCEDURE — 99283 EMERGENCY DEPT VISIT LOW MDM: CPT

## 2021-12-20 PROCEDURE — 94761 N-INVAS EAR/PLS OXIMETRY MLT: CPT

## 2021-12-20 PROCEDURE — 85652 RBC SED RATE AUTOMATED: CPT

## 2021-12-20 PROCEDURE — 6370000000 HC RX 637 (ALT 250 FOR IP): Performed by: NURSE PRACTITIONER

## 2021-12-20 RX ORDER — SODIUM CHLORIDE 0.9 % (FLUSH) 0.9 %
5-40 SYRINGE (ML) INJECTION PRN
Status: DISCONTINUED | OUTPATIENT
Start: 2021-12-20 | End: 2021-12-23 | Stop reason: HOSPADM

## 2021-12-20 RX ORDER — ROPINIROLE 1 MG/1
3 TABLET, FILM COATED ORAL NIGHTLY
Status: DISCONTINUED | OUTPATIENT
Start: 2021-12-20 | End: 2021-12-23 | Stop reason: HOSPADM

## 2021-12-20 RX ORDER — SODIUM CHLORIDE 0.9 % (FLUSH) 0.9 %
5-40 SYRINGE (ML) INJECTION EVERY 12 HOURS SCHEDULED
Status: DISCONTINUED | OUTPATIENT
Start: 2021-12-20 | End: 2021-12-23 | Stop reason: HOSPADM

## 2021-12-20 RX ORDER — PREDNISOLONE ACETATE 10 MG/ML
1 SUSPENSION/ DROPS OPHTHALMIC 4 TIMES DAILY
Status: DISCONTINUED | OUTPATIENT
Start: 2021-12-20 | End: 2021-12-23 | Stop reason: HOSPADM

## 2021-12-20 RX ORDER — ONDANSETRON 2 MG/ML
4 INJECTION INTRAMUSCULAR; INTRAVENOUS EVERY 6 HOURS PRN
Status: DISCONTINUED | OUTPATIENT
Start: 2021-12-20 | End: 2021-12-23 | Stop reason: HOSPADM

## 2021-12-20 RX ORDER — ATORVASTATIN CALCIUM 10 MG/1
10 TABLET, FILM COATED ORAL NIGHTLY
Status: DISCONTINUED | OUTPATIENT
Start: 2021-12-20 | End: 2021-12-23 | Stop reason: HOSPADM

## 2021-12-20 RX ORDER — SODIUM CHLORIDE 9 MG/ML
25 INJECTION, SOLUTION INTRAVENOUS PRN
Status: DISCONTINUED | OUTPATIENT
Start: 2021-12-20 | End: 2021-12-23 | Stop reason: HOSPADM

## 2021-12-20 RX ORDER — ATORVASTATIN CALCIUM 10 MG/1
10 TABLET, FILM COATED ORAL NIGHTLY
COMMUNITY

## 2021-12-20 RX ORDER — MONTELUKAST SODIUM 10 MG/1
10 TABLET ORAL NIGHTLY
Status: DISCONTINUED | OUTPATIENT
Start: 2021-12-20 | End: 2021-12-23 | Stop reason: HOSPADM

## 2021-12-20 RX ORDER — ALBUTEROL SULFATE 90 UG/1
2 AEROSOL, METERED RESPIRATORY (INHALATION) EVERY 6 HOURS PRN
Status: DISCONTINUED | OUTPATIENT
Start: 2021-12-20 | End: 2021-12-23 | Stop reason: HOSPADM

## 2021-12-20 RX ORDER — SENNA AND DOCUSATE SODIUM 50; 8.6 MG/1; MG/1
1 TABLET, FILM COATED ORAL NIGHTLY
Status: DISCONTINUED | OUTPATIENT
Start: 2021-12-20 | End: 2021-12-23 | Stop reason: HOSPADM

## 2021-12-20 RX ORDER — ACETAMINOPHEN 650 MG/1
650 SUPPOSITORY RECTAL EVERY 6 HOURS PRN
Status: DISCONTINUED | OUTPATIENT
Start: 2021-12-20 | End: 2021-12-23 | Stop reason: HOSPADM

## 2021-12-20 RX ORDER — CLOPIDOGREL BISULFATE 75 MG/1
75 TABLET ORAL NIGHTLY
Status: DISCONTINUED | OUTPATIENT
Start: 2021-12-20 | End: 2021-12-23 | Stop reason: HOSPADM

## 2021-12-20 RX ORDER — DULOXETIN HYDROCHLORIDE 60 MG/1
60 CAPSULE, DELAYED RELEASE ORAL NIGHTLY
Status: DISCONTINUED | OUTPATIENT
Start: 2021-12-20 | End: 2021-12-23 | Stop reason: HOSPADM

## 2021-12-20 RX ORDER — ONDANSETRON 4 MG/1
4 TABLET, ORALLY DISINTEGRATING ORAL EVERY 8 HOURS PRN
Status: DISCONTINUED | OUTPATIENT
Start: 2021-12-20 | End: 2021-12-23 | Stop reason: HOSPADM

## 2021-12-20 RX ORDER — PANTOPRAZOLE SODIUM 40 MG/1
40 TABLET, DELAYED RELEASE ORAL
Status: DISCONTINUED | OUTPATIENT
Start: 2021-12-21 | End: 2021-12-23 | Stop reason: HOSPADM

## 2021-12-20 RX ORDER — ASPIRIN 81 MG/1
81 TABLET ORAL NIGHTLY
Status: DISCONTINUED | OUTPATIENT
Start: 2021-12-20 | End: 2021-12-23 | Stop reason: HOSPADM

## 2021-12-20 RX ORDER — DEXTRAN, HYPROMELLOSE 2910 .001; .003 ML/ML; ML/ML
1 SOLUTION OPHTHALMIC EVERY 4 HOURS
COMMUNITY
End: 2022-04-05

## 2021-12-20 RX ORDER — POLYETHYLENE GLYCOL 3350 17 G/17G
17 POWDER, FOR SOLUTION ORAL DAILY PRN
Status: DISCONTINUED | OUTPATIENT
Start: 2021-12-20 | End: 2021-12-23 | Stop reason: HOSPADM

## 2021-12-20 RX ORDER — PREDNISOLONE ACETATE 10 MG/ML
1 SUSPENSION/ DROPS OPHTHALMIC 4 TIMES DAILY
COMMUNITY
Start: 2021-12-15 | End: 2022-01-13 | Stop reason: ALTCHOICE

## 2021-12-20 RX ORDER — ACETAMINOPHEN 325 MG/1
650 TABLET ORAL EVERY 6 HOURS PRN
Status: DISCONTINUED | OUTPATIENT
Start: 2021-12-20 | End: 2021-12-23 | Stop reason: HOSPADM

## 2021-12-20 RX ORDER — FUROSEMIDE 40 MG/1
40 TABLET ORAL DAILY
Status: DISCONTINUED | OUTPATIENT
Start: 2021-12-21 | End: 2021-12-23 | Stop reason: HOSPADM

## 2021-12-20 RX ADMIN — IOPAMIDOL 75 ML: 755 INJECTION, SOLUTION INTRAVENOUS at 17:43

## 2021-12-20 RX ADMIN — ROPINIROLE HYDROCHLORIDE 3 MG: 1 TABLET, FILM COATED ORAL at 23:28

## 2021-12-20 RX ADMIN — DULOXETINE HYDROCHLORIDE 60 MG: 60 CAPSULE, DELAYED RELEASE ORAL at 23:29

## 2021-12-20 RX ADMIN — MONTELUKAST 10 MG: 10 TABLET, FILM COATED ORAL at 23:29

## 2021-12-20 RX ADMIN — SENNOSIDES AND DOCUSATE SODIUM 1 TABLET: 50; 8.6 TABLET ORAL at 23:28

## 2021-12-20 RX ADMIN — ATORVASTATIN CALCIUM 10 MG: 10 TABLET, FILM COATED ORAL at 23:29

## 2021-12-20 RX ADMIN — ASPIRIN 81 MG: 81 TABLET, COATED ORAL at 23:28

## 2021-12-20 RX ADMIN — HYDROCORTISONE SODIUM SUCCINATE 100 MG: 100 INJECTION, POWDER, FOR SOLUTION INTRAMUSCULAR; INTRAVENOUS at 20:49

## 2021-12-20 RX ADMIN — CLOPIDOGREL BISULFATE 75 MG: 75 TABLET ORAL at 23:29

## 2021-12-20 RX ADMIN — Medication 10 ML: at 23:32

## 2021-12-20 ASSESSMENT — ENCOUNTER SYMPTOMS
ABDOMINAL DISTENTION: 0
VOMITING: 0
DIARRHEA: 0
SHORTNESS OF BREATH: 0
SORE THROAT: 0
RESPIRATORY NEGATIVE: 1
NAUSEA: 0

## 2021-12-20 ASSESSMENT — PAIN SCALES - GENERAL: PAINLEVEL_OUTOF10: 0

## 2021-12-20 NOTE — ED NOTES
Difficult PIV stick. Blood drawn and sent to lab. ED staff member called to attempt UGPIV and will see pt shortly.       Stacy Benavides RN  12/20/21 8032

## 2021-12-20 NOTE — ED PROVIDER NOTES
[facility]  EMERGENCY DEPARTMENTENCOUNTER      Pt Name: Kerry Thornton  MRN: [id number]  [place] 1960  Date ofevaluation: 12/20/2021  Provider: Rod Knight MD    CHIEF COMPLAINT       Chief Complaint   Patient presents with    Eye Problem     losing vision over the last 2 wks; left eye. seen at [facility] today and was sent to the ER          HISTORY OF PRESENT ILLNESS   (Location/Symptom, Timing/Onset,Context/Setting, Quality, Duration, Modifying Factors, Severity)  Note limiting factors. Kerry Thornton is a 59 y.o. female  who  has a past medical history of Anxiety, Asthma, Cancer ([address] GERD (gastroesophageal reflux disease), Herniated lumbar intervertebral disc, IBS (irritable bowel syndrome), Migraine, Nausea & vomiting, OA (osteoarthritis), Sleep apnea, and UTI (urinary tract infection). 68-year-old female who presents with left eye vision loss which has been worsening over the last 2 weeks. Now almost complete loss of vision can see around the outside of her eye but complete blackness on the inside of her left eye. Patient also having some associated left-sided headache not necessarily worse with eating just mild temporal headache that is otherwise nonradiating. No other associated symptoms. No other neurologic symptoms. Denies numbness or weakness. No other recent trauma. No recent illness. Nothing seems to make the symptoms better or worse. Constant in nature and worsening at this point. Risk factors is age and chronic medical history. Patient was sent from ophthalmology today with a piece of paper that said rule out optic neuritis and/or concern for giant cell or temporal arteritis. NursingNotes were reviewed. REVIEW OF SYSTEMS    (2-9 systems for level 4, 10 or more for level 5)     Review of Systems   Constitutional: Negative. Negative for fever. HENT: Negative. Negative for sore throat.     Eyes: Positive for visual disturbance. Respiratory: Negative. Negative for shortness of breath. Cardiovascular: Negative. Negative for chest pain and palpitations. Gastrointestinal: Negative for abdominal distention, diarrhea, nausea and vomiting. Genitourinary: Negative. Musculoskeletal: Negative. Skin: Negative. Neurological: Positive for headaches. Negative for light-headedness. Hematological: Negative. Does not bruise/bleed easily. Except as noted above the remainder of the review of systems was reviewed and negative.        PAST MEDICAL HISTORY     Past Medical History:   Diagnosis Date    Anxiety     Asthma     low O 2 SATS POST-OP    Cancer (HCC)     melanoma    GERD (gastroesophageal reflux disease)     Herniated lumbar intervertebral disc     l4    IBS (irritable bowel syndrome)     Migraine     Nausea & vomiting     OA (osteoarthritis)     Sleep apnea     PT STATES \"PROBABLE\"    UTI (urinary tract infection)     FREQUENT         SURGICALHISTORY       Past Surgical History:   Procedure Laterality Date    BREAST LUMPECTOMY      CHOLECYSTECTOMY, LAPAROSCOPIC      ENDOMETRIAL ABLATION      HYSTERECTOMY      JOINT REPLACEMENT      BILAT KNEES    LUMBAR SPINE SURGERY      stimulator in place    OTHER SURGICAL HISTORY  6/20/11    lap gastric sleeve    OTHER SURGICAL HISTORY Right 09/01/2017    RIGHT PATELLA REVISION      PAIN MANAGEMENT PROCEDURE Bilateral 8/27/2020    BILATERAL SACROILIAC JOINT INJECTION SITE CONFIRMED BY FLUOROSCOPY performed by Ameya Adam MD at [address] [place] [address] Bilateral 10/22/2020    BILATERAL SACROILIAC JOINT INJECTION SITE CONFIRMED BY FLUOROSCOPY performed by Ameya Adam MD at [address] [place] [address] Left 1/21/2021    LEFT SACRAL ONE EPIDURAL STEROID INJECTION SITE CONFIRMED BY FLUOROSCOPY performed by Ameya Adam MD at [address] [place] [address]    resection of open wound and mass of suprapubic area    TUBAL LIGATION           CURRENT MEDICATIONS       Previous Medications    ALBUTEROL (PROAIR HFA) 108 (90 BASE) MCG/ACT INHALER    Inhale 2 puffs into the lungs every 6 hours as needed. ASPIRIN 81 MG EC TABLET    Take 1 tablet by mouth daily    ATORVASTATIN (LIPITOR) 80 MG TABLET    Take 1 tablet by mouth nightly    CLOPIDOGREL (PLAVIX) 75 MG TABLET    Take 1 tablet by mouth daily    CYCLOBENZAPRINE (FLEXERIL) 10 MG TABLET    Take 10 mg by mouth 3 times daily as needed for Muscle spasms (patient unsure of dose)    DULOXETINE (CYMBALTA) 60 MG EXTENDED RELEASE CAPSULE    Take 60 mg by mouth daily     FERROUS SULFATE (IRON PO)    Take by mouth daily    FUROSEMIDE (LASIX) 40 MG TABLET    Take 1 tablet by mouth daily    MONTELUKAST (SINGULAIR) 10 MG TABLET    Take 10 mg by mouth nightly. OMEPRAZOLE (PRILOSEC) 20 MG CAPSULE    Take 20 mg by mouth nightly.     ROPINIROLE (REQUIP) 2 MG TABLET    Take 3 mg by mouth nightly     SENNOSIDES-DOCUSATE SODIUM (SENOKOT-S) 8.6-50 MG TABLET    Take 1 tablet by mouth daily    VITAMIN D PO    Take by mouth daily            Morphine, Scopolamine, Adhesive tape, Ampicillin, Nitrofurantoin, Percocet [oxycodone-acetaminophen], Propoxyphene, Vicodin [hydrocodone-acetaminophen], and Codeine    FAMILY HISTORY       Family History   Problem Relation Age of Onset    Cancer Mother     Heart Disease Father     High Blood Pressure Father     Stroke Father     High Blood Pressure Sister     Diabetes Sister     High Blood Pressure Brother     Heart Disease Paternal Aunt     Heart Disease Paternal Uncle           SOCIAL HISTORY       Social History     Socioeconomic History    Marital status:      Spouse name: Not on file    Number of children: Not on file    Years of education: Not on file    Highest education level: Not on file   Occupational History    Not on file   Tobacco Use    Smoking status: Never Smoker    Smokeless tobacco: Never Used   Vaping Use    Vaping Use: Never used   Substance and Sexual Activity    Alcohol use: Yes     Comment: RARE    Drug use: No    Sexual activity: Yes     Partners: Male   Other Topics Concern    Not on file   Social History Narrative    Not on file     Social Determinants of Health     Financial Resource Strain:     Difficulty of Paying Living Expenses: Not on file   Food Insecurity:     Worried About Running Out of Food in the Last Year: Not on file    Brennon of Food in the Last Year: Not on file   Transportation Needs:     Lack of Transportation (Medical): Not on file    Lack of Transportation (Non-Medical): Not on file   Physical Activity:     Days of Exercise per Week: Not on file    Minutes of Exercise per Session: Not on file   Stress:     Feeling of Stress : Not on file   Social Connections:     Frequency of Communication with Friends and Family: Not on file    Frequency of Social Gatherings with Friends and Family: Not on file    Attends Religion Services: Not on file    Active Member of [address] [place] [address] or Organizations: Not on file    Attends Club or Organization Meetings: Not on file    Marital Status: Not on file   Intimate Partner Violence:     Fear of Current or Ex-Partner: Not on file    Emotionally Abused: Not on file    Physically Abused: Not on file    Sexually Abused: Not on file   Housing Stability:     Unable to Pay for Housing in the Last Year: Not on file    Number of [place] in the Last Year: Not on file    Unstable Housing in the Last Year: Not on file       SCREENINGS             PHYSICAL EXAM    (up to 7 for level 4, 8 or more for level 5)     ED Triage Vitals [12/20/21 1601]   BP Temp Temp src Pulse Resp SpO2 Height Weight   (!) 161/115 -- -- 108 16 100 % 5' 8\" (1.727 m) (!) 323 lb 10.2 oz (146.8 kg)       Physical Exam  Vitals and nursing note reviewed. Constitutional:       General: She is not in acute distress. Appearance: Normal appearance.  She is not toxic-appearing or diaphoretic. HENT:      Head: Normocephalic and atraumatic. Right Ear: Tympanic membrane and external ear normal.      Left Ear: Tympanic membrane and external ear normal.      Nose: Nose normal.      Mouth/Throat:      Mouth: Mucous membranes are moist.   Eyes:      General: Lids are normal. Lids are everted, no foreign bodies appreciated. No visual field deficit. Extraocular Movements: Extraocular movements intact. Right eye: Normal extraocular motion and no nystagmus. Left eye: Normal extraocular motion. Conjunctiva/sclera: Conjunctivae normal.      Right eye: Right conjunctiva is not injected. No chemosis, exudate or hemorrhage. Left eye: Left conjunctiva is not injected. No chemosis, exudate or hemorrhage. Funduscopic exam:     Right eye: No hemorrhage or exudate. Red reflex present. Left eye: No hemorrhage or exudate. Red reflex present. Comments: Pupils bilateral dilated likely due to dilation which took place at the eye doctor's office    Vision barely to light in the left eye on testing   Cardiovascular:      Rate and Rhythm: Normal rate and regular rhythm. Heart sounds: Normal heart sounds. Pulmonary:      Effort: Pulmonary effort is normal.      Breath sounds: Normal breath sounds. Abdominal:      General: Abdomen is flat. Bowel sounds are normal.      Palpations: Abdomen is soft. Tenderness: There is no abdominal tenderness. Skin:     General: Skin is warm and dry. Capillary Refill: Capillary refill takes less than 2 seconds. Neurological:      General: No focal deficit present. Mental Status: She is alert and oriented to person, place, and time. GCS: GCS eye subscore is 4. GCS verbal subscore is 5. GCS motor subscore is 6. Cranial Nerves: Cranial nerves are intact. No cranial nerve deficit, dysarthria or facial asymmetry. Sensory: Sensation is intact. No sensory deficit.       Motor: Motor [facility]  [address] [place] [address]   Phone [phone]   BASIC METABOLIC PANEL W/ REFLEX TO MG FOR LOW K    Narrative:     Performed at:  [facility]  [address] [place], [place] [address]   Phone [phone]       All other labs were within normal range or not returned as of this dictation. EMERGENCY DEPARTMENT COURSE and DIFFERENTIAL DIAGNOSIS/MDM:   Vitals:    Vitals:    12/20/21 1601 12/20/21 1628   BP: (!) 161/115 122/74   Pulse: 108    Resp: 16    SpO2: 100%    Weight: (!) 323 lb 10.2 oz (146.8 kg)    Height: 5' 8\" (1.727 m)        Patient was given thefollowing medications:  Medications   iopamidol (ISOVUE-370) 76 % injection 75 mL (75 mLs IntraVENous Given 12/20/21 1743)       ED COURSE & MEDICAL DECISION MAKING    Pertinent Labs & Imaging studies reviewed. (See chart for details)   -  Patient seen and evaluated in the emergency department. -  Triage and nursing notes reviewed and incorporated. -  Old chart records reviewed and incorporated. -  Differential diagnosis includes: Differential diagnosis: Temporal arteritis, optic neuritis, migraine, CVA, iritis, Uveitis, Conjunctivitis, Herpes Keratitis, Corneal Ulcer, Corneal Abrasion, Acute Angle Glaucoma, Orbital Cellulitis/Abscess, Periorbital/Preseptal Cellulitis, other. 56-year-old female with complete loss of vision in her left eye sent from ophthalmology with concern for optic neuritis versus temporal arteritis. I would also potentially be concern for a retinal artery infarction. Patient otherwise has normal neurologic exam besides her eye findings. Patient does have some pain which lowers the concern for CVA. Patient may also have optic neuritis as stated. Otherwise afebrile not tachycardic saturating well on room air. Patient shows no signs of sepsis. Patient's eyes were dilated but they were dilated at the ophthalmology office.   Will order CT head, ESR, CRP and plan for admission.    -  Work-up included:  See above  -  ED treatment included: See above  -  Results discussed with patient. REASSESSMENT     ED Course as of 12/20/21 1846   Mon Dec 20, 2021   Chloe Rangel Spoke with Dr. Sylvia Villasenor with vascular who would be willing to perform the biopsy inpatient if necessary. Plan at this time is for patient to be admitted. Labs show elevated CRP. CT head unremarkable. [SC]      ED Course User Index  [SC] Noble Pastrana MD         CRITICAL CARE TIME   Total Critical Care time was 21 minutes, excluding separately reportable procedures. There was a high probability of clinically significant/life threatening deterioration in the patient's condition which required my urgent intervention. CONSULTS:  IP CONSULT TO VASCULAR SURGERY    PROCEDURES:  Unless otherwise noted below, none     Procedures    FINAL IMPRESSION      1. Vision loss of left eye    2. Left temporal headache          DISPOSITION/PLAN   DISPOSITION Decision To Admit 12/20/2021 06:43:05 PM      PATIENT REFERREDTO:  No follow-up provider specified.     DISCHARGEMEDICATIONS:  New Prescriptions    No medications on file          (Please note that portions of this note were completed with a voice recognition program.  Efforts were made to edit the dictations but occasionally words are mis-transcribed.)    Noble Pastrana MD (electronically signed)  Attending Emergency Physician         Noble Pastrana MD  12/20/21 4038

## 2021-12-21 ENCOUNTER — APPOINTMENT (OUTPATIENT)
Dept: CT IMAGING | Age: 61
DRG: 125 | End: 2021-12-21
Payer: COMMERCIAL

## 2021-12-21 LAB
A/G RATIO: 1.2 (ref 1.1–2.2)
ALBUMIN SERPL-MCNC: 3.6 G/DL (ref 3.4–5)
ALP BLD-CCNC: 95 U/L (ref 40–129)
ALT SERPL-CCNC: 14 U/L (ref 10–40)
ANION GAP SERPL CALCULATED.3IONS-SCNC: 12 MMOL/L (ref 3–16)
APTT: 35.4 SEC (ref 26.2–38.6)
AST SERPL-CCNC: 12 U/L (ref 15–37)
BILIRUB SERPL-MCNC: 0.3 MG/DL (ref 0–1)
BUN BLDV-MCNC: 7 MG/DL (ref 7–20)
CALCIUM SERPL-MCNC: 9.5 MG/DL (ref 8.3–10.6)
CHLORIDE BLD-SCNC: 104 MMOL/L (ref 99–110)
CHOLESTEROL, FASTING: 119 MG/DL (ref 0–199)
CO2: 24 MMOL/L (ref 21–32)
CREAT SERPL-MCNC: <0.5 MG/DL (ref 0.6–1.2)
ESTIMATED AVERAGE GLUCOSE: 116.9 MG/DL
GFR AFRICAN AMERICAN: >60
GFR NON-AFRICAN AMERICAN: >60
GLUCOSE BLD-MCNC: 103 MG/DL (ref 70–99)
HBA1C MFR BLD: 5.7 %
HCT VFR BLD CALC: 35 % (ref 36–48)
HDLC SERPL-MCNC: 44 MG/DL (ref 40–60)
HEMOGLOBIN: 11.2 G/DL (ref 12–16)
INR BLD: 1.07 (ref 0.88–1.12)
LDL CHOLESTEROL CALCULATED: 59 MG/DL
MCH RBC QN AUTO: 23.9 PG (ref 26–34)
MCHC RBC AUTO-ENTMCNC: 32.1 G/DL (ref 31–36)
MCV RBC AUTO: 74.3 FL (ref 80–100)
PDW BLD-RTO: 18.1 % (ref 12.4–15.4)
PLATELET # BLD: 234 K/UL (ref 135–450)
PMV BLD AUTO: 8.3 FL (ref 5–10.5)
POTASSIUM REFLEX MAGNESIUM: 4.1 MMOL/L (ref 3.5–5.1)
PROTHROMBIN TIME: 12.1 SEC (ref 9.9–12.7)
RBC # BLD: 4.7 M/UL (ref 4–5.2)
SODIUM BLD-SCNC: 140 MMOL/L (ref 136–145)
TOTAL PROTEIN: 6.7 G/DL (ref 6.4–8.2)
TRIGLYCERIDE, FASTING: 79 MG/DL (ref 0–150)
VLDLC SERPL CALC-MCNC: 16 MG/DL
WBC # BLD: 4.8 K/UL (ref 4–11)

## 2021-12-21 PROCEDURE — 2580000003 HC RX 258: Performed by: STUDENT IN AN ORGANIZED HEALTH CARE EDUCATION/TRAINING PROGRAM

## 2021-12-21 PROCEDURE — 2580000003 HC RX 258: Performed by: NURSE PRACTITIONER

## 2021-12-21 PROCEDURE — 6360000004 HC RX CONTRAST MEDICATION: Performed by: STUDENT IN AN ORGANIZED HEALTH CARE EDUCATION/TRAINING PROGRAM

## 2021-12-21 PROCEDURE — 6360000002 HC RX W HCPCS: Performed by: STUDENT IN AN ORGANIZED HEALTH CARE EDUCATION/TRAINING PROGRAM

## 2021-12-21 PROCEDURE — 93882 EXTRACRANIAL UNI/LTD STUDY: CPT

## 2021-12-21 PROCEDURE — 1200000000 HC SEMI PRIVATE

## 2021-12-21 PROCEDURE — 85610 PROTHROMBIN TIME: CPT

## 2021-12-21 PROCEDURE — 71260 CT THORAX DX C+: CPT

## 2021-12-21 PROCEDURE — 83036 HEMOGLOBIN GLYCOSYLATED A1C: CPT

## 2021-12-21 PROCEDURE — APPNB30 APP NON BILLABLE TIME 0-30 MINS: Performed by: NURSE PRACTITIONER

## 2021-12-21 PROCEDURE — 85027 COMPLETE CBC AUTOMATED: CPT

## 2021-12-21 PROCEDURE — 6370000000 HC RX 637 (ALT 250 FOR IP): Performed by: NURSE PRACTITIONER

## 2021-12-21 PROCEDURE — 85730 THROMBOPLASTIN TIME PARTIAL: CPT

## 2021-12-21 PROCEDURE — 99221 1ST HOSP IP/OBS SF/LOW 40: CPT | Performed by: STUDENT IN AN ORGANIZED HEALTH CARE EDUCATION/TRAINING PROGRAM

## 2021-12-21 PROCEDURE — 94760 N-INVAS EAR/PLS OXIMETRY 1: CPT

## 2021-12-21 PROCEDURE — 80061 LIPID PANEL: CPT

## 2021-12-21 PROCEDURE — 80053 COMPREHEN METABOLIC PANEL: CPT

## 2021-12-21 PROCEDURE — 36415 COLL VENOUS BLD VENIPUNCTURE: CPT

## 2021-12-21 RX ADMIN — POLYVINYL ALCOHOL, POVIDONE 1 DROP: .5; .6 LIQUID OPHTHALMIC at 16:54

## 2021-12-21 RX ADMIN — POLYVINYL ALCOHOL, POVIDONE 1 DROP: .5; .6 LIQUID OPHTHALMIC at 04:49

## 2021-12-21 RX ADMIN — POLYVINYL ALCOHOL, POVIDONE 1 DROP: .5; .6 LIQUID OPHTHALMIC at 12:45

## 2021-12-21 RX ADMIN — SENNOSIDES AND DOCUSATE SODIUM 1 TABLET: 50; 8.6 TABLET ORAL at 20:33

## 2021-12-21 RX ADMIN — ROPINIROLE HYDROCHLORIDE 3 MG: 1 TABLET, FILM COATED ORAL at 20:32

## 2021-12-21 RX ADMIN — FUROSEMIDE 40 MG: 40 TABLET ORAL at 09:10

## 2021-12-21 RX ADMIN — IOPAMIDOL 75 ML: 755 INJECTION, SOLUTION INTRAVENOUS at 12:59

## 2021-12-21 RX ADMIN — POLYVINYL ALCOHOL, POVIDONE 1 DROP: .5; .6 LIQUID OPHTHALMIC at 00:33

## 2021-12-21 RX ADMIN — PREDNISOLONE ACETATE 1 DROP: 10 SUSPENSION/ DROPS OPHTHALMIC at 00:48

## 2021-12-21 RX ADMIN — MONTELUKAST 10 MG: 10 TABLET, FILM COATED ORAL at 20:33

## 2021-12-21 RX ADMIN — POLYVINYL ALCOHOL, POVIDONE 1 DROP: .5; .6 LIQUID OPHTHALMIC at 20:34

## 2021-12-21 RX ADMIN — PREDNISOLONE ACETATE 1 DROP: 10 SUSPENSION/ DROPS OPHTHALMIC at 16:54

## 2021-12-21 RX ADMIN — POLYVINYL ALCOHOL, POVIDONE 1 DROP: .5; .6 LIQUID OPHTHALMIC at 09:10

## 2021-12-21 RX ADMIN — ASPIRIN 81 MG: 81 TABLET, COATED ORAL at 20:33

## 2021-12-21 RX ADMIN — PREDNISOLONE ACETATE 1 DROP: 10 SUSPENSION/ DROPS OPHTHALMIC at 12:45

## 2021-12-21 RX ADMIN — DULOXETINE HYDROCHLORIDE 60 MG: 60 CAPSULE, DELAYED RELEASE ORAL at 20:33

## 2021-12-21 RX ADMIN — Medication 10 ML: at 09:10

## 2021-12-21 RX ADMIN — ATORVASTATIN CALCIUM 10 MG: 10 TABLET, FILM COATED ORAL at 20:33

## 2021-12-21 RX ADMIN — SODIUM CHLORIDE 1000 MG: 9 INJECTION, SOLUTION INTRAVENOUS at 19:53

## 2021-12-21 RX ADMIN — CLOPIDOGREL BISULFATE 75 MG: 75 TABLET ORAL at 20:33

## 2021-12-21 RX ADMIN — PREDNISOLONE ACETATE 1 DROP: 10 SUSPENSION/ DROPS OPHTHALMIC at 09:11

## 2021-12-21 RX ADMIN — PREDNISOLONE ACETATE 1 DROP: 10 SUSPENSION/ DROPS OPHTHALMIC at 20:34

## 2021-12-21 ASSESSMENT — PAIN SCALES - GENERAL: PAINLEVEL_OUTOF10: 0

## 2021-12-21 ASSESSMENT — ENCOUNTER SYMPTOMS
DIARRHEA: 0
CHEST TIGHTNESS: 0
ABDOMINAL PAIN: 0
NAUSEA: 0
BACK PAIN: 1
VOMITING: 0
SHORTNESS OF BREATH: 0

## 2021-12-21 NOTE — PROGRESS NOTES
4 Eyes Skin Assessment     NAME:  Emma Carmichael OF BIRTH:  1960  MEDICAL RECORD NUMBER:  [id number]    The patient is being assess for  Admission    I agree that 2 RN's have performed a thorough Head to Toe Skin Assessment on the patient. ALL assessment sites listed below have been assessed. Areas assessed by both nurses:    Head, Face, Ears, Shoulders, Back, Chest, Arms, Elbows, Hands, Sacrum. Buttock, Coccyx, Ischium and Legs. Feet and Heels        Does the Patient have a Wound? No noted wound(s)       Tom Prevention initiated:  No   Wound Care Orders initiated:  No    Pressure Injury (Stage 3,4, Unstageable, DTI, NWPT, and Complex wounds) if present place consult order under [de-identified] No    New and Established Ostomies if present place consult order under : No      Nurse 1 eSignature: Electronically signed by See Araya RN on 12/21/21 at 7:02 AM EST    **SHARE this note so that the co-signing nurse is able to place an eSignature**    Nurse 2 eSignature: Electronically signed by Jonathan Rosales RN on 12/22/21 at 7:01 AM EST       . Mother refusing 2 RN skin check at this time. Will re-attempt as able.

## 2021-12-21 NOTE — PROGRESS NOTES
Patient arrived to room 1277 with no complications. She is A/o x4, RA, VSS, denies pain. Evening assessment and medication given with no complications. Bed in lowest locked position, call light within reach. Pt refused bed alarm. She was educated on safety and told to call out if help is needed. Will continue to monitor throughout shift.

## 2021-12-21 NOTE — CONSULTS
[facility]  Consultation Note    12/21/2021 1:58 PM    Chief Complaint: Vision Difficulties in Left Eye     Reason for Consultation: Giant Cell Arteritis Evaluation    History of Present Illness  Patient is a 59 y.o. female who reported to the ED with complaints of Vision Difficulties in her Left Eye. She has a significant PMH of Migraine, Bilateral Knee Replacement, Lumbar Spine Surgery, Pain Stimulator, Atrial-Septal Defect Repair (earlier this month) and CVA (December 2020 - Right Arm and Speech mostly affected). She reports she began having issues with vision in her Left Eye about 2 weeks ago and describes her eye as feeling as though there is a \"film\" over her eye. She went to the Ophthalmologist when her symptoms began and was given a steroid eye drop to use. She went back to the Ophthamologist yesterday to follow up as her vision had worsened. She now describes her vision as being unable to see objects directly in front of her but able to see objects in her periphery of her Left Eye. She was instructed to come to the ED for further evaluation of her Left Eye. We have been consulted to evaluate the patient for Giant Cell Arteritis. At present, the patient is seen in stable condition. Review of Systems  Review of Systems   Constitutional: Positive for appetite change. Decreased appetite over the past month   Eyes: Positive for visual disturbance. Left Eye - unable to see objects directly in front of her, peripheral vision intact  Right Eye - denies visual changes   Respiratory: Negative for chest tightness and shortness of breath. Cardiovascular: Positive for leg swelling. Chronic Lower Extremity swelling after back surgeries L > R   Gastrointestinal: Negative for abdominal pain, diarrhea, nausea and vomiting. Musculoskeletal: Positive for back pain. Chronic - multiple back surgeries   Skin: Negative.     Neurological: Negative for speech difficulty, weakness and headaches. Previous CVA in December 2020, slight numbness to Left Temporal Area   Psychiatric/Behavioral: Negative for confusion.         Past Medical History:   Diagnosis Date    Anxiety     Asthma     low O 2 SATS POST-OP    Cancer (HCC)     melanoma    GERD (gastroesophageal reflux disease)     Herniated lumbar intervertebral disc     l4    IBS (irritable bowel syndrome)     Migraine     Nausea & vomiting     OA (osteoarthritis)     Sleep apnea     PT STATES \"PROBABLE\"    UTI (urinary tract infection)     FREQUENT       Past Surgical History:   Procedure Laterality Date    BREAST LUMPECTOMY      CHOLECYSTECTOMY, LAPAROSCOPIC      ENDOMETRIAL ABLATION      HYSTERECTOMY      JOINT REPLACEMENT      BILAT KNEES    LUMBAR SPINE SURGERY      stimulator in place    OTHER SURGICAL HISTORY  6/20/11    lap gastric sleeve    OTHER SURGICAL HISTORY Right 09/01/2017    RIGHT PATELLA REVISION      PAIN MANAGEMENT PROCEDURE Bilateral 8/27/2020    BILATERAL SACROILIAC JOINT INJECTION SITE CONFIRMED BY FLUOROSCOPY performed by Tiffanie Burns MD at [address] [place] [address] Bilateral 10/22/2020    BILATERAL SACROILIAC JOINT INJECTION SITE CONFIRMED BY FLUOROSCOPY performed by Tiffanie Burns MD at [address] [place] [address] Left 1/21/2021    LEFT SACRAL ONE EPIDURAL STEROID INJECTION SITE CONFIRMED BY FLUOROSCOPY performed by Tiffanie Burns MD at [address] [place] [address]    resection of open wound and mass of suprapubic area    TUBAL LIGATION         Allergies   Allergen Reactions    Morphine Hives and Nausea And Vomiting    Scopolamine Nausea Only    Adhesive Tape Other (See Comments)     Red skin      Ampicillin Hives, Itching and Nausea Only    Nitrofurantoin     Percocet [Oxycodone-Acetaminophen]     Propoxyphene     Vicodin [Hydrocodone-Acetaminophen]     Codeine Itching and Anxiety Heart Disease Father     High Blood Pressure Father     Stroke Father     High Blood Pressure Sister     Diabetes Sister     High Blood Pressure Brother     Heart Disease Paternal Aunt     Heart Disease Paternal Uncle        Vital Signs  Vitals:    12/20/21 2126 12/20/21 2150 12/21/21 0734 12/21/21 0830   BP:  (!) 155/89 134/84    Pulse:  82 89    Resp:  18 15    Temp:  97.7 °F (36.5 °C) 97.6 °F (36.4 °C)    TempSrc:  Oral Oral    SpO2: 100% 95% 98% 98%   Weight:  (!) 325 lb 2.9 oz (147.5 kg)     Height:  5' 8\" (1.727 m)         Physical Exam:  General: no apparent distress, alert and oriented x3, afebrile  Psychiatric: affect appropriate, cooperative  Head/Eyes/Ears/Nose/Throat: normocephalic, atraumatic, PERRLA, face symmetric, denies temporal tenderness - reports numbness, impaired central vision, intact peripheral vision of Left Eye  Neck: supple, symmetrical, trachea midline  Chest/Lungs: clear to auscultation bilaterally, no accessory muscle use  Cardiac: regular rate and rhythm  Abdomen: soft, nontender, active bowel sounds  Extremities: warm and well perfused, no signs of cyanosis or ischemia, +1 edema to RLE, +2 edema to LLE, bilateral upper and lower extremity motorsensory intact  Vascular exam:  -R Radial: +2  -L Radial: +2  -R DP: +2  -L DP: +2  Wounds: none    Labs  Lab Results   Component Value Date    WBC 4.8 12/21/2021    HGB 11.2 12/21/2021    HCT 35.0 12/21/2021    MCV 74.3 12/21/2021     12/21/2021     Lab Results   Component Value Date     12/21/2021    K 4.1 12/21/2021     12/21/2021    CO2 24 12/21/2021    BUN 7 12/21/2021    CREATININE <0.5 12/21/2021      No components found for: GLU    Scheduled Meds:    polyvinyl alcohol-povidone  1 drop Both Eyes Q4H    DULoxetine  60 mg Oral Nightly    furosemide  40 mg Oral Daily    montelukast  10 mg Oral Nightly    pantoprazole  40 mg Oral QAM AC    prednisoLONE acetate  1 drop Left Eye 4x Daily    rOPINIRole  3 mg Oral

## 2021-12-21 NOTE — CARE COORDINATION
INITIAL CASE MANAGEMENT ASSESSMENT    Reviewed chart, met with patient to assess possible discharge needs. Explained Case Management role/services. Living Situation: Patient lives in a two story townhouse with her spouse. They are in the process of moving into a one story home. ADLs: independent; works      DME: 4WW, cane, BSC--does not use     PT/OT Recs: n/a     Active Services: none     Transportation: active ; spouse      Medications: takes on her own    PCP: Darshan Hanks      PLAN/COMMENTS: [place] plans to return home at [place] and denied needs at the present time. SW/CM provided contact information for patient or family to call with any questions. SW/CM will follow and assist as needed.     Electronically signed by REID Fisher, GISELL, Case Management on 12/21/2021 at 4:43 PM  [place] [phone]

## 2021-12-21 NOTE — PLAN OF CARE
Problem: Falls - Risk of:  Goal: Will remain free from falls  Description: Will remain free from falls  Outcome: Ongoing  Note: Patient educated on fall prevention. Call light is within reach, bed locked in lowest position, personal items within reach, and bed alarm is on. Will round on patient per unit guidelines. Goal: Absence of physical injury  Description: Absence of physical injury  Outcome: Ongoing     Problem: Safety:  Goal: Free from accidental physical injury  Description: Free from accidental physical injury  Outcome: Ongoing  Goal: Free from intentional harm  Description: Free from intentional harm  Outcome: Ongoing     Problem: Daily Care:  Goal: Daily care needs are met  Description: Daily care needs are met  Outcome: Ongoing  Note: Patient assessed to determine ability to perform daily needs and ADLs. Patient assisted with any daily needs that were not able to be met individually by patient. [place] encouraged, although patient educated to ask for assistance when needed. Will continue to monitor and assist patient with meeting daily care needs as needed. Problem: Discharge Planning:  Goal: Patients continuum of care needs are met  Description: Patients continuum of care needs are met  Outcome: Ongoing  Note: Assessed patients knowledge of discharge. Will continue to work with patient on discharge planning and answer patient questions. Will consult case management and  as necessary.

## 2021-12-21 NOTE — PROGRESS NOTES
Hospitalist Progress Note      PCP: Compa Ayala    Date of Admission: 12/20/2021    Chief Complaint: left eye vision loss. Hospital Course:    59 y.o. female with PMHx of anxiety, asthma, melanoma, GERD presented to [facility] with 2-week history of vision loss to the left eye. Patient reports this was gradual over the last 2 weeks. Now almost complete loss of vision. Admitted for investigation and treatment of possible temporal arteritis. Followed by neurology and vascular surgery. Was incidentally found to have mediastinal LN with multiple lung nodules. Subjective:   Patient continues to report loss of vision in the left eye. Patient was informed about mediastinal LN. Medications:  Reviewed    Infusion Medications    sodium chloride       Scheduled Medications    polyvinyl alcohol-povidone  1 drop Both Eyes Q4H    DULoxetine  60 mg Oral Nightly    furosemide  40 mg Oral Daily    montelukast  10 mg Oral Nightly    pantoprazole  40 mg Oral QAM AC    prednisoLONE acetate  1 drop Left Eye 4x Daily    rOPINIRole  3 mg Oral Nightly    sennosides-docusate sodium  1 tablet Oral Nightly    atorvastatin  10 mg Oral Nightly    clopidogrel  75 mg Oral Nightly    sodium chloride flush  5-40 mL IntraVENous 2 times per day    enoxaparin  40 mg SubCUTAneous Daily    aspirin  81 mg Oral Nightly     PRN Meds: albuterol sulfate HFA, sodium chloride flush, sodium chloride, ondansetron **OR** ondansetron, polyethylene glycol, acetaminophen **OR** acetaminophen    No intake or output data in the 24 hours ending 12/21/21 0856    Physical Exam Performed:    /84   Pulse 89   Temp 97.6 °F (36.4 °C) (Oral)   Resp 15   Ht 5' 8\" (1.727 m)   Wt (!) 325 lb 2.9 oz (147.5 kg)   SpO2 98%   BMI 49.44 kg/m²     General appearance: No apparent distress, appears stated age and cooperative. HEENT: Pupils equal, round, and reactive to light. Conjunctivae/corneas clear.   Neck: Supple, with full range of motion. No jugular venous distention. Trachea midline. Respiratory:  Normal respiratory effort. Clear to auscultation, bilaterally without Rales/Wheezes/Rhonchi. Cardiovascular: Regular rate and rhythm with normal S1/S2 without murmurs, rubs or gallops. Abdomen: Soft, non-tender, non-distended with normal bowel sounds. Musculoskeletal: No clubbing, cyanosis or edema bilaterally. Full range of motion without deformity. Skin: Skin color, texture, turgor normal.  No rashes or lesions. Neurologic:    - left eye with decreased viusal acuity > peripheral.   - sensation and power are intact. - remainder of exam is unremarkable . Psychiatric: Alert and oriented, thought content appropriate, normal insight  Capillary Refill: Brisk,3 seconds, normal   Peripheral Pulses: +2 palpable, equal bilaterally       Labs:   Recent Labs     12/20/21  1630 12/21/21  0611   WBC 6.5 4.8   HGB 12.2 11.2*   HCT 39.4 35.0*    234     Recent Labs     12/20/21  1630 12/21/21  0611    140   K 4.2 4.1    104   CO2 25 24   BUN 9 7   CREATININE 0.6 <0.5*   CALCIUM 9.5 9.5     Recent Labs     12/21/21  0611   AST 12*   ALT 14   BILITOT 0.3   ALKPHOS 95     Recent Labs     12/21/21  0611   INR 1.07     No results for input(s): Michelle Mould in the last 72 hours. Urinalysis:      Lab Results   Component Value Date    NITRU Negative 12/02/2021    WBCUA 0-2 12/02/2021    BACTERIA 1+ 12/02/2021    RBCUA 0-2 12/02/2021    BLOODU Negative 12/02/2021    SPECGRAV >=1.030 12/02/2021    GLUCOSEU Negative 12/02/2021       Radiology:  CT HEAD WO CONTRAST   Final Result   No CT evidence of an acute infarct. No flow limiting stenosis or large vessel occlusion visualized within the   head or neck. Incidentally noted mediastinal adenopathy, new when compared to a prior exam   obtained on 12/10/2020. Advise CT of the chest for further evaluation.       RECOMMENDATIONS:   Unavailable         CTA HEAD NECK W CONTRAST   Final Result   No CT evidence of an acute infarct. No flow limiting stenosis or large vessel occlusion visualized within the   head or neck. Incidentally noted mediastinal adenopathy, new when compared to a prior exam   obtained on 12/10/2020. Advise CT of the chest for further evaluation. RECOMMENDATIONS:   Unavailable                 Assessment/Plan:    Active Hospital Problems    Diagnosis     Vision loss of left eye [H54.62]      Vision Loss Left Eye concern for giant cell arteritis vs optic neuritis  sent to ED by [facility] for possible temporal arteritis, CT head: no acute infarct, CTA head/neck: no flow limiting stenosis of head or neck   MRI - pt has spinal stimulator CAN NOT have mri  Plan :  - continue ASA, statin and plavix  - vascular consulted , US doppler ordered. - neurology consulted in ED: recommend giving hydrocortisone, MRI and biopsy  - check lipids, HBA1c  - will start on pulse steroids , methylprednisolone 1 g daily for 5 days ( discussed with neurology). - PPI GI prophylaxis ( patient is already on ). Mediastinal lymphadenopathy. Multiple lung nodules  Scattered multiple scattered subcentimeter PICC pulmonary nodules suspicious of malignancy as well as mediastinal adenopathy. Will order CT abdomen and pelvis to look for a possible primary. We will consider consulting pulmonology tomorrow based on CT results     History of CVA   History of PFO. Closed by device after stroke in 2020    Asthma  - continue inhaler  - continue Singulair     GERD  - continue prilosec     Anxiety  - continue cymbalta      DVT Prophylaxis: lovenox  Diet: Diet NPO  Code Status: Full Code    PT/OT Eval Status: ambulatory.      Dispo - pending clinical improvement      Pearlene Lesch, MD

## 2021-12-21 NOTE — H&P
Hospital Medicine History & Physical      PCP: Kenia Powers    Date of Admission: 12/20/2021    Date of Service: Pt seen/examined on 12/20/2021 and Admitted to Inpatient with expected LOS greater than two midnights due to medical therapy. Chief Complaint:  Vision loss of left eye      History Of Present Illness:      59 y.o. female with PMHx of anxiety, asthma, melanoma, GERD presented to [facility] with 2-week history of vision loss to the left eye. Patient reports this was gradual over the last 2 weeks. Now almost complete loss of vision. She was seen at [place] [address] today and sent to the ER for further evaluation. Patient reports slight numbness to the upper left side of the face just left of the eye. No pain with movement around the eye. Denies headache. No weakness or numbness of extremities. No recent trauma. No recent illness. No alleviating or exacerbating symptoms. Patient was referred to ED from ophthalmology with rule out optic neuritis and/or concern for giant cell temporal arteritis.     Past Medical History:          Diagnosis Date    Anxiety     Asthma     low O 2 SATS POST-OP    Cancer (HCC)     melanoma    GERD (gastroesophageal reflux disease)     Herniated lumbar intervertebral disc     l4    IBS (irritable bowel syndrome)     Migraine     Nausea & vomiting     OA (osteoarthritis)     Sleep apnea     PT STATES \"PROBABLE\"    UTI (urinary tract infection)     FREQUENT       Past Surgical History:          Procedure Laterality Date    BREAST LUMPECTOMY      CHOLECYSTECTOMY, LAPAROSCOPIC      ENDOMETRIAL ABLATION      HYSTERECTOMY      JOINT REPLACEMENT      BILAT KNEES    LUMBAR SPINE SURGERY      stimulator in place    OTHER SURGICAL HISTORY  6/20/11    lap gastric sleeve    OTHER SURGICAL HISTORY Right 09/01/2017    RIGHT PATELLA REVISION      PAIN MANAGEMENT PROCEDURE Bilateral 8/27/2020    BILATERAL SACROILIAC JOINT INJECTION SITE CONFIRMED BY FLUOROSCOPY performed by Ishaan Rodriguez MD at [facility] Bilateral 10/22/2020    BILATERAL SACROILIAC JOINT INJECTION SITE CONFIRMED BY FLUOROSCOPY performed by Ishaan Rodriguez MD at [address] Left 1/21/2021    LEFT SACRAL ONE EPIDURAL STEROID INJECTION SITE CONFIRMED BY FLUOROSCOPY performed by Ishaan Rodriguez MD at [address] RESECTION  4-16-10    resection of open wound and mass of suprapubic area    TUBAL LIGATION         Medications Prior to Admission:      Prior to Admission medications    Medication Sig Start Date End Date Taking?  Authorizing Provider   atorvastatin (LIPITOR) 10 MG tablet Take 10 mg by mouth nightly   Yes Historical Provider, MD   prednisoLONE acetate (PRED FORTE) 1 % ophthalmic suspension Place 1 drop into the left eye 4 times daily 12/15/21  Yes Historical Provider, MD   dextran 70-hypromellose (TEARS PURE) 0.1-0.3 % SOLN opthalmic solution Place 1 drop into both eyes every 4 hours   Yes Historical Provider, MD   VITAMIN D PO Take 1 tablet by mouth nightly    Yes Historical Provider, MD   Ferrous Sulfate (IRON PO) Take 1 tablet by mouth nightly    Yes Historical Provider, MD   furosemide (LASIX) 40 MG tablet Take 1 tablet by mouth daily 10/29/21  Yes Caridad Silva MD   clopidogrel (PLAVIX) 75 MG tablet Take 1 tablet by mouth daily  Patient taking differently: Take 75 mg by mouth nightly  3/16/21  Yes Caridad Silva MD   aspirin 81 MG EC tablet Take 1 tablet by mouth daily  Patient taking differently: Take 81 mg by mouth nightly  12/11/20  Yes KESIHA Mota - CNP   sennosides-docusate sodium (SENOKOT-S) 8.6-50 MG tablet Take 1 tablet by mouth nightly    Yes Historical Provider, MD   cyclobenzaprine (FLEXERIL) 10 MG tablet Take 10 mg by mouth nightly    Yes Historical Provider, MD   DULoxetine (CYMBALTA) 60 MG extended release capsule Take 60 mg by mouth nightly  7/22/17 Yes Historical Provider, MD   rOPINIRole (REQUIP) 3 MG tablet Take 3 mg by mouth nightly  9/16/15  Yes Historical Provider, MD   montelukast (SINGULAIR) 10 MG tablet Take 10 mg by mouth nightly. Yes Historical Provider, MD   omeprazole (PRILOSEC) 20 MG capsule Take 20 mg by mouth nightly. 4/15/10  Yes Historical Provider, MD   albuterol (PROAIR HFA) 108 (90 BASE) MCG/ACT inhaler Inhale 2 puffs into the lungs every 6 hours as needed. Yes Historical Provider, MD       Allergies:  Morphine, Scopolamine, Adhesive tape, Ampicillin, Nitrofurantoin, Percocet [oxycodone-acetaminophen], Propoxyphene, Vicodin [hydrocodone-acetaminophen], and Codeine    Social History:        TOBACCO:   reports that she has never smoked. She has never used smokeless tobacco.  ETOH:   reports current alcohol use. Family History:      Reviewed in detail positive as follows:        Problem Relation Age of Onset    Cancer Mother     Heart Disease Father     High Blood Pressure Father     Stroke Father     High Blood Pressure Sister     Diabetes Sister     High Blood Pressure Brother     Heart Disease Paternal Aunt     Heart Disease Paternal Uncle        REVIEW OF SYSTEMS:   Pertinent positives as noted in the HPI. All other systems reviewed and negative. PHYSICAL EXAM PERFORMED:    BP (!) 155/89   Pulse 82   Temp 97.7 °F (36.5 °C) (Oral)   Resp 18   Ht 5' 8\" (1.727 m)   Wt (!) 325 lb 2.9 oz (147.5 kg)   SpO2 95%   BMI 49.44 kg/m²     General appearance:  Well developed, well nourished,  female lying in hospital bed in no apparent distress, appears stated age and cooperative. HEENT:  Normal cephalic, atraumatic without obvious deformity. Pupils equal, round, and reactive to light. Conjunctivae/corneas clear. No scalp tenderness. Neck: Supple, with full range of motion. No jugular venous distention. Trachea midline. Respiratory:  Normal respiratory effort.  Clear to auscultation, bilaterally without accessory muscle use. Cardiovascular:  Regular rate and rhythm without murmurs, no lower extremity edema. Abdomen: Soft, obese abdomen, non-tender, non-distended, without rebound or guarding. Normal bowel sounds. Musculoskeletal:  Moves all extremities equally. Full range of motion without deformity. Skin: Skin warm, dry and intact. No rashes or lesions. Neurologic:  Neurovascularly intact without any focal sensory/motor deficits. Cranial nerves: II-XII intact, grossly non-focal.  Psychiatric:  Alert and oriented, thought content appropriate, normal insight  Capillary Refill: Brisk,< 3 seconds   Peripheral Pulses: +2 palpable, equal bilaterally       Labs:     Recent Labs     12/20/21  1630   WBC 6.5   HGB 12.2   HCT 39.4        Recent Labs     12/20/21  1630      K 4.2      CO2 25   BUN 9   CREATININE 0.6   CALCIUM 9.5     No results for input(s): AST, ALT, BILIDIR, BILITOT, ALKPHOS in the last 72 hours. No results for input(s): INR in the last 72 hours. No results for input(s): Shirley Dross in the last 72 hours. Urinalysis:      Lab Results   Component Value Date    NITRU Negative 12/02/2021    WBCUA 0-2 12/02/2021    BACTERIA 1+ 12/02/2021    RBCUA 0-2 12/02/2021    BLOODU Negative 12/02/2021    SPECGRAV >=1.030 12/02/2021    GLUCOSEU Negative 12/02/2021       Radiology:     CT HEAD WO CONTRAST   Final Result   No CT evidence of an acute infarct. No flow limiting stenosis or large vessel occlusion visualized within the   head or neck. Incidentally noted mediastinal adenopathy, new when compared to a prior exam   obtained on 12/10/2020. Advise CT of the chest for further evaluation. RECOMMENDATIONS:   Unavailable         CTA HEAD NECK W CONTRAST   Final Result   No CT evidence of an acute infarct. No flow limiting stenosis or large vessel occlusion visualized within the   head or neck.       Incidentally noted mediastinal adenopathy, new when compared to a prior exam obtained on 12/10/2020. Advise CT of the chest for further evaluation. RECOMMENDATIONS:   Unavailable         MRI BRAIN W WO CONTRAST    (Results Pending)       ASSESSMENT:    Active Hospital Problems    Diagnosis Date Noted    Vision loss of left eye [H54.62] 12/20/2021         PLAN:    Vision Loss Left Eye concern for giant cell arteritis vs optic neuritis  - sent to ED by [facility] for possible temporal arteritis  - CT head: no acute infarct  - CTA head/neck: no flow limiting stenosis of head or neck  - MRI - pt has spinal stimulator CAN NOT have mri  - continue ASA, statin and plavix  - vascular consulted in ED: Dr Shannan Iniguez is willing to perform biopsy did not give recommendations on steroids  - neurology consulted in ED: recommend giving hydrocortisone, MRI and biopsy  - check lipids, HBA1c    Asthma  - continue inhaler  - continue Singulair    GERD  - continue prilosec    Anxiety  - continue cymbalta    DVT Prophylaxis: Lovenox  Diet: Diet NPO  Code Status: Full Code    Dispo - Inpatient       [address], APRN - CNP    Thank you Lupe Castro for the opportunity to be involved in this patient's care. If you have any questions or concerns please feel free to contact me at [phone].

## 2021-12-21 NOTE — CONSULTS
Neurology Consult Note  Reason for Consult: concern for L eye vision loss, possible GCA    Chief complaint: vision loss L eye    Dr Ric Pepper MD asked me to see Meliza Chao in consultation for evaluation of concern for L eye vision loss, possible GCA    History of Present Illness:  Meliza Chao is a 59 y.o. female who presents with vision loss L eye. I obtained my information via interview w/ the patient, supplemented by chart review. The patient tells me that on 12/11 she noticed she was having some blurry vision in her L eye. She though initially that it may be related to her glasses though did not improve. The blurry vision worsened by Tuesday and she ended up seeing her eye doctor at [facility] that day. They reportedly did an array of tests and told her that she may have some \"inflammation\" in her eye and prescribed steroid drops. She says since she was prescribed the drops her visual decline has been more gradual.  It eventually got to the point it is now where she can see around the periphery in her L eye but the center looks like crumbled up toilet paper that impairs her vision. She was advised to go back to [facility] if this worsened, which she did yesterday and underwent more testing and was told to go to the ED in order to get an evaluation for possible GCA or optic neuritis. CT head was negative. Neurovascular imaging was unremarkable. ESR normal, CRP mildly elevated. She remains symptomatic. She denies any associated headaches w/ her vision changes. No temporal tenderness (says her L temple actually feels numb). No jaw claudication. She does have a hx of migraines w/ visual aura though nothing like this. Her headaches have been less in frequency following her recent stroke diagnosis. She had her PFO closed earlier this month.       Medical History:  Past Medical History:   Diagnosis Date    Anxiety     Asthma     low O 2 SATS POST-OP    Cancer ([facility] [address] melanoma    GERD (gastroesophageal reflux disease)     Herniated lumbar intervertebral disc     l4    IBS (irritable bowel syndrome)     Migraine     Nausea & vomiting     OA (osteoarthritis)     Sleep apnea     PT STATES \"PROBABLE\"    UTI (urinary tract infection)     FREQUENT     Past Surgical History:   Procedure Laterality Date    BREAST LUMPECTOMY      CHOLECYSTECTOMY, LAPAROSCOPIC      ENDOMETRIAL ABLATION      HYSTERECTOMY      JOINT REPLACEMENT      BILAT KNEES    LUMBAR SPINE SURGERY      stimulator in place    OTHER SURGICAL HISTORY  6/20/11    lap gastric sleeve    OTHER SURGICAL HISTORY Right 09/01/2017    RIGHT PATELLA REVISION      PAIN MANAGEMENT PROCEDURE Bilateral 8/27/2020    BILATERAL SACROILIAC JOINT INJECTION SITE CONFIRMED BY FLUOROSCOPY performed by Tahir Mcclure MD at [address] [place] [address] Bilateral 10/22/2020    BILATERAL SACROILIAC JOINT INJECTION SITE CONFIRMED BY FLUOROSCOPY performed by Tahir Mcclure MD at [address] [place] [address] Left 1/21/2021    LEFT SACRAL ONE EPIDURAL STEROID INJECTION SITE CONFIRMED BY FLUOROSCOPY performed by Tahir Mcclure MD at [address] [place], [place] [id number]-    resection of open wound and mass of suprapubic area    TUBAL LIGATION       Scheduled Meds:   polyvinyl alcohol-povidone  1 drop Both Eyes Q4H    DULoxetine  60 mg Oral Nightly    furosemide  40 mg Oral Daily    montelukast  10 mg Oral Nightly    pantoprazole  40 mg Oral QAM AC    prednisoLONE acetate  1 drop Left Eye 4x Daily    rOPINIRole  3 mg Oral Nightly    sennosides-docusate sodium  1 tablet Oral Nightly    atorvastatin  10 mg Oral Nightly    clopidogrel  75 mg Oral Nightly    sodium chloride flush  5-40 mL IntraVENous 2 times per day    enoxaparin  40 mg SubCUTAneous Daily    aspirin  81 mg Oral Nightly     Medications Prior to Admission:   atorvastatin (LIPITOR) 10 MG tablet, Take 10 mg by mouth nightly  prednisoLONE acetate (PRED FORTE) 1 % ophthalmic suspension, Place 1 drop into the left eye 4 times daily  dextran 70-hypromellose (TEARS PURE) 0.1-0.3 % SOLN opthalmic solution, Place 1 drop into both eyes every 4 hours  VITAMIN D PO, Take 1 tablet by mouth nightly   Ferrous Sulfate (IRON PO), Take 1 tablet by mouth nightly   furosemide (LASIX) 40 MG tablet, Take 1 tablet by mouth daily  clopidogrel (PLAVIX) 75 MG tablet, Take 1 tablet by mouth daily (Patient taking differently: Take 75 mg by mouth nightly )  aspirin 81 MG EC tablet, Take 1 tablet by mouth daily (Patient taking differently: Take 81 mg by mouth nightly )  sennosides-docusate sodium (SENOKOT-S) 8.6-50 MG tablet, Take 1 tablet by mouth nightly   cyclobenzaprine (FLEXERIL) 10 MG tablet, Take 10 mg by mouth nightly   DULoxetine (CYMBALTA) 60 MG extended release capsule, Take 60 mg by mouth nightly   rOPINIRole (REQUIP) 3 MG tablet, Take 3 mg by mouth nightly   montelukast (SINGULAIR) 10 MG tablet, Take 10 mg by mouth nightly. omeprazole (PRILOSEC) 20 MG capsule, Take 20 mg by mouth nightly. albuterol (PROAIR HFA) 108 (90 BASE) MCG/ACT inhaler, Inhale 2 puffs into the lungs every 6 hours as needed.     Allergies   Allergen Reactions    Morphine Hives and Nausea And Vomiting    Scopolamine Nausea Only    Adhesive Tape Other (See Comments)     Red skin      Ampicillin Hives, Itching and Nausea Only    Nitrofurantoin     Percocet [Oxycodone-Acetaminophen]     Propoxyphene     Vicodin [Hydrocodone-Acetaminophen]     Codeine Itching and Anxiety     \"Goes nuts\"     Family History   Problem Relation Age of Onset    Cancer Mother     Heart Disease Father     High Blood Pressure Father     Stroke Father     High Blood Pressure Sister     Diabetes Sister     High Blood Pressure Brother     Heart Disease Paternal Aunt     Heart Disease Paternal Uncle      Social History     Tobacco Use   Smoking Status Never Smoker Smokeless Tobacco Never Used     Social History     Substance and Sexual Activity   Drug Use No     Social History     Substance and Sexual Activity   Alcohol Use Yes    Comment: RARE     ROS:  Constitutional- No weight loss or fevers  Eyes- No diplopia. No photophobia. Ears/nose/throat- No dysphagia. No Dysarthria  Cardiovascular- No palpitations. No chest pain  Respiratory- No dyspnea. No Cough  Gastrointestinal- No Abdominal pain. No Vomiting. Genitourinary- No incontinence. No urinary retention  Musculoskeletal- No myalgia. No arthralgia  Skin- No rash. No easy bruising. Psychiatric- No depression. No anxiety  Endocrine- No diabetes. No thyroid issues. Hematologic- No bleeding difficulty. No fatigue  Neurologic- No weakness. No Headache. Exam:  Blood pressure 134/84, pulse 89, temperature 97.6 °F (36.4 °C), temperature source Oral, resp. rate 15, height 5' 8\" (1.727 m), weight (!) 325 lb 2.9 oz (147.5 kg), SpO2 98 %. Constitutional    Vital signs: BP, HR, and RR reviewed   General alert, no distress, well-nourished  Eyes: unable to visualize the fundi  Cardiovascular: no peripheral edema. Psychiatric: cooperative with examination, no psychotic behavior noted. Neurologic  Mental status:   orientation to person, place, time. General fund of knowledge grossly intact   Memory grossly intact   Attention intact as able to attend well to the exam     Language fluent in conversation   Comprehension intact; follows simple commands  Cranial nerves:   CN2: visual fields full in L eye. Intact vision in periphery of L eye. Central vision is impaired. CN 3,4,6: extraocular muscles intact. L pupils slightly larger than R, both round and reactive.     CN5: facial sensation symmetric   CN7: face symmetric without dysarthria  CN8: hearing grossly intact  CN9: palate elevated symmetrically  CN11: trap full strength on shoulder shrug  CN12: tongue midline with protrusion  Strength: good strength in all 4 extremities   Deep tendon reflexes: normal in all 4 extremities  Sensory: light touch intact in all 4 extremities. Cerebellar/coordination: finger nose finger normal without ataxia  Tone: normal in all 4 extremities  Gait: deferred at this time for comfort. Labs  CRP 8.2  ESR 26    Glucose 103  Na 140  K 4.1  BUN 7  Cr < 0.5    LDL 59    WBC 4.8K  Hg 11.2  Platelets 498    Studies  CT head w/o 12/20/21, independently reviewed  No acute intracranial abnormality. CTA head/neck 12/20/21, independently reviewed  No flow limiting stenosis or large vessel occlusion visualized within the   head or neck. Incidentally noted mediastinal adenopathy, new when compared to a prior exam   obtained on 12/10/2020.  Advise CT of the chest for further evaluation. Limited TTE 12/2/21   Normal left ventricular size. Moderate concentric left ventricular hypertrophy. Normal left ventricular systolic function with an estimated ejection fraction of 65%. There is a 35 mm pfo closure device that appears well seated. A bubble study was performed and fails to show evidence of shunting. Impression  1. Progressive monocular (L) vision loss. Apparently she was told by [facility] she had some sort of inflammation in the eye and was started on prednisolone drops which she suggests slowed the progression though still continued. I'm not sure of the etiology. She really has no other features to suggest GCA. 2.  Hx RUE weakness/sensory deficit. Diagnosed w/ clinical stroke 12/2020. 3.  PFO. Recently closed. 4.  Mediastinal adenopathy as noted on CTA. Per Hospitalist.      Recommendations  1. MRI orbits/brain w/wo would be helpful. I will order though I'm not sure if this will be able to be completed or not. She does have a spinal cord stimulator that is no longer functioning. It appears this was unable to be obtained last time as she didn't have the remote to put the device in safe mode. Should re-check.     2. There are plans for a TA biopsy per vascular. 3.  Will follow up this afternoon.       Cooper Peck NP  [address] [place] [address] [address] Neurology    A copy of this note was provided for Dr Сергей Durham MD

## 2021-12-21 NOTE — PLAN OF CARE
Problem: Falls - Risk of:  Goal: Will remain free from falls  Description: Will remain free from falls  12/21/2021 0814 by Roman Barthel, RN  Outcome: Ongoing   Fall risk assessment completed. Fall precautions in place. Call light within reach. Pt educated on calling for assistance before getting up. Walkway free of clutter. Will continue to monitor. Electronically signed by Roman Barthel, RN on 12/21/2021 at 8:15 AM    Problem: Falls - Risk of:  Goal: Absence of physical injury  Description: Absence of physical injury  12/21/2021 0814 by Roman Barthel, RN  Outcome: Ongoing   Pt is free of injury. No injury noted. Fall precautions in place. Call light within reach. Will monitor. Electronically signed by Roman Barthel, RN on 12/21/2021 at 8:15 AM    Problem: Safety:  Goal: Free from accidental physical injury  Description: Free from accidental physical injury  12/21/2021 0814 by Roman Barthel, RN  Outcome: Ongoing   Pt is free of injury. No injury noted. Fall precautions in place. Call light within reach. Will monitor. Electronically signed by Roman Barthel, RN on 12/21/2021 at 8:15 AM    Problem: Safety:  Goal: Free from intentional harm  Description: Free from intentional harm  12/21/2021 0814 by Roman Barthel, RN  Outcome: Ongoing   Pt is free of intentional harm. No intentions noted. Will monitor. Electronically signed by Roman Barthel, RN on 12/21/2021 at 8:15 AM    Problem: Daily Care:  Goal: Daily care needs are met  Description: Daily care needs are met  12/21/2021 0814 by Roman Barthel, RN  Outcome: Ongoing   Daily care needs have been met by staff and patient. Will continue to monitor needs.   Electronically signed by Roman Barthel, RN on 12/21/2021 at 8:15 AM    Problem: Discharge Planning:  Goal: Patients continuum of care needs are met  Description: Patients continuum of care needs are met  12/21/2021 0814 by Roman Barthel, RN  Outcome: Ongoing   No plans for discharge at this time, SW to follow for d/c needs. Will continue to monitor and reassess.  Electronically signed by Felipa Lraa RN on 12/21/2021 at 8:16 AM

## 2021-12-21 NOTE — ED NOTES
Report given to [facility]. All questions answered.       Korey Oliver RN  12/20/21 2118

## 2021-12-21 NOTE — PROGRESS NOTES
Pharmacy Medication Reconciliation Note     List of medications patient is currently taking is complete. Source of information:   1. Patient   2. EMR    Notes regarding home medications:   1. Patient received some of her morning home medication doses today before presenting to the ER. Pt takes most of her medications in the evening.       [id number] [facility] [place], Pharmacy Intern  12/20/2021 8:13 PM

## 2021-12-22 ENCOUNTER — TELEPHONE (OUTPATIENT)
Dept: PULMONOLOGY | Age: 61
End: 2021-12-22

## 2021-12-22 ENCOUNTER — APPOINTMENT (OUTPATIENT)
Dept: INTERVENTIONAL RADIOLOGY/VASCULAR | Age: 61
DRG: 125 | End: 2021-12-22
Payer: COMMERCIAL

## 2021-12-22 ENCOUNTER — APPOINTMENT (OUTPATIENT)
Dept: CT IMAGING | Age: 61
DRG: 125 | End: 2021-12-22
Payer: COMMERCIAL

## 2021-12-22 LAB
APPEARANCE CSF: CLEAR
APPEARANCE CSF: CLEAR
CLOT EVALUATION CSF: ABNORMAL
CLOT EVALUATION CSF: ABNORMAL
COLOR CSF: COLORLESS
COLOR CSF: COLORLESS
FOLATE: 14.77 NG/ML (ref 4.78–24.2)
GLUCOSE, CSF: 107 MG/DL (ref 40–80)
NO DIFFERENTIAL CSF: ABNORMAL
NO DIFFERENTIAL CSF: ABNORMAL
PROTEIN CSF: 32 MG/DL (ref 15–45)
RBC CSF: 36 /CUMM
RBC CSF: 7 /CUMM
REASON FOR REJECTION: NORMAL
REASON FOR REJECTION: NORMAL
REJECTED TEST: NORMAL
REJECTED TEST: NORMAL
TSH REFLEX FT4: 0.92 UIU/ML (ref 0.27–4.2)
TUBE NUMBER CSF: ABNORMAL
TUBE NUMBER CSF: ABNORMAL
VITAMIN B-12: >2000 PG/ML (ref 211–911)
VOLUME CSF: 2.5 ML
VOLUME CSF: 2.5 ML
WBC CSF: 10 /CUMM (ref 0–5)
WBC CSF: 10 /CUMM (ref 0–5)

## 2021-12-22 PROCEDURE — 84443 ASSAY THYROID STIM HORMONE: CPT

## 2021-12-22 PROCEDURE — 82607 VITAMIN B-12: CPT

## 2021-12-22 PROCEDURE — 36415 COLL VENOUS BLD VENIPUNCTURE: CPT

## 2021-12-22 PROCEDURE — APPSS30 APP SPLIT SHARED TIME 16-30 MINUTES: Performed by: NURSE PRACTITIONER

## 2021-12-22 PROCEDURE — 62328 DX LMBR SPI PNXR W/FLUOR/CT: CPT

## 2021-12-22 PROCEDURE — 86592 SYPHILIS TEST NON-TREP QUAL: CPT

## 2021-12-22 PROCEDURE — 0065U SYFLS TST NONTREPONEMAL ANTB: CPT

## 2021-12-22 PROCEDURE — 86780 TREPONEMA PALLIDUM: CPT

## 2021-12-22 PROCEDURE — 82784 ASSAY IGA/IGD/IGG/IGM EACH: CPT

## 2021-12-22 PROCEDURE — 94760 N-INVAS EAR/PLS OXIMETRY 1: CPT

## 2021-12-22 PROCEDURE — 74177 CT ABD & PELVIS W/CONTRAST: CPT

## 2021-12-22 PROCEDURE — 84157 ASSAY OF PROTEIN OTHER: CPT

## 2021-12-22 PROCEDURE — 6360000002 HC RX W HCPCS: Performed by: STUDENT IN AN ORGANIZED HEALTH CARE EDUCATION/TRAINING PROGRAM

## 2021-12-22 PROCEDURE — APPNB30 APP NON BILLABLE TIME 0-30 MINS: Performed by: NURSE PRACTITIONER

## 2021-12-22 PROCEDURE — 2580000003 HC RX 258: Performed by: STUDENT IN AN ORGANIZED HEALTH CARE EDUCATION/TRAINING PROGRAM

## 2021-12-22 PROCEDURE — 82164 ANGIOTENSIN I ENZYME TEST: CPT

## 2021-12-22 PROCEDURE — 82042 OTHER SOURCE ALBUMIN QUAN EA: CPT

## 2021-12-22 PROCEDURE — 6360000004 HC RX CONTRAST MEDICATION: Performed by: STUDENT IN AN ORGANIZED HEALTH CARE EDUCATION/TRAINING PROGRAM

## 2021-12-22 PROCEDURE — 87390 HIV-1 AG IA: CPT

## 2021-12-22 PROCEDURE — 86702 HIV-2 ANTIBODY: CPT

## 2021-12-22 PROCEDURE — 82746 ASSAY OF FOLIC ACID SERUM: CPT

## 2021-12-22 PROCEDURE — 86255 FLUORESCENT ANTIBODY SCREEN: CPT

## 2021-12-22 PROCEDURE — 99223 1ST HOSP IP/OBS HIGH 75: CPT | Performed by: INTERNAL MEDICINE

## 2021-12-22 PROCEDURE — 82040 ASSAY OF SERUM ALBUMIN: CPT

## 2021-12-22 PROCEDURE — 2580000003 HC RX 258: Performed by: NURSE PRACTITIONER

## 2021-12-22 PROCEDURE — 86701 HIV-1ANTIBODY: CPT

## 2021-12-22 PROCEDURE — 2709999900 IR LUMBAR PUNCTURE FOR DIAGNOSIS

## 2021-12-22 PROCEDURE — 6360000002 HC RX W HCPCS: Performed by: NURSE PRACTITIONER

## 2021-12-22 PROCEDURE — 86038 ANTINUCLEAR ANTIBODIES: CPT

## 2021-12-22 PROCEDURE — 82945 GLUCOSE OTHER FLUID: CPT

## 2021-12-22 PROCEDURE — 1200000000 HC SEMI PRIVATE

## 2021-12-22 PROCEDURE — 83921 ORGANIC ACID SINGLE QUANT: CPT

## 2021-12-22 PROCEDURE — 83916 OLIGOCLONAL BANDS: CPT

## 2021-12-22 PROCEDURE — 009U3ZX DRAINAGE OF SPINAL CANAL, PERCUTANEOUS APPROACH, DIAGNOSTIC: ICD-10-PCS | Performed by: STUDENT IN AN ORGANIZED HEALTH CARE EDUCATION/TRAINING PROGRAM

## 2021-12-22 PROCEDURE — 6370000000 HC RX 637 (ALT 250 FOR IP): Performed by: NURSE PRACTITIONER

## 2021-12-22 PROCEDURE — 84182 PROTEIN WESTERN BLOT TEST: CPT

## 2021-12-22 PROCEDURE — 89050 BODY FLUID CELL COUNT: CPT

## 2021-12-22 RX ADMIN — DULOXETINE HYDROCHLORIDE 60 MG: 60 CAPSULE, DELAYED RELEASE ORAL at 20:32

## 2021-12-22 RX ADMIN — ROPINIROLE HYDROCHLORIDE 3 MG: 1 TABLET, FILM COATED ORAL at 20:32

## 2021-12-22 RX ADMIN — IOPAMIDOL 75 ML: 755 INJECTION, SOLUTION INTRAVENOUS at 13:56

## 2021-12-22 RX ADMIN — MONTELUKAST 10 MG: 10 TABLET, FILM COATED ORAL at 20:32

## 2021-12-22 RX ADMIN — PREDNISOLONE ACETATE 1 DROP: 10 SUSPENSION/ DROPS OPHTHALMIC at 12:56

## 2021-12-22 RX ADMIN — IOHEXOL 50 ML: 240 INJECTION, SOLUTION INTRATHECAL; INTRAVASCULAR; INTRAVENOUS; ORAL at 13:56

## 2021-12-22 RX ADMIN — POLYVINYL ALCOHOL, POVIDONE 1 DROP: .5; .6 LIQUID OPHTHALMIC at 01:09

## 2021-12-22 RX ADMIN — PREDNISOLONE ACETATE 1 DROP: 10 SUSPENSION/ DROPS OPHTHALMIC at 20:34

## 2021-12-22 RX ADMIN — ASPIRIN 81 MG: 81 TABLET, COATED ORAL at 20:32

## 2021-12-22 RX ADMIN — POLYVINYL ALCOHOL, POVIDONE 1 DROP: .5; .6 LIQUID OPHTHALMIC at 05:05

## 2021-12-22 RX ADMIN — POLYVINYL ALCOHOL, POVIDONE 1 DROP: .5; .6 LIQUID OPHTHALMIC at 20:34

## 2021-12-22 RX ADMIN — FUROSEMIDE 40 MG: 40 TABLET ORAL at 10:10

## 2021-12-22 RX ADMIN — PREDNISOLONE ACETATE 1 DROP: 10 SUSPENSION/ DROPS OPHTHALMIC at 11:07

## 2021-12-22 RX ADMIN — Medication 10 ML: at 20:33

## 2021-12-22 RX ADMIN — ATORVASTATIN CALCIUM 10 MG: 10 TABLET, FILM COATED ORAL at 20:32

## 2021-12-22 RX ADMIN — SODIUM CHLORIDE 1000 MG: 9 INJECTION, SOLUTION INTRAVENOUS at 15:02

## 2021-12-22 RX ADMIN — POLYVINYL ALCOHOL, POVIDONE 1 DROP: .5; .6 LIQUID OPHTHALMIC at 12:54

## 2021-12-22 RX ADMIN — PANTOPRAZOLE SODIUM 40 MG: 40 TABLET, DELAYED RELEASE ORAL at 06:41

## 2021-12-22 RX ADMIN — SENNOSIDES AND DOCUSATE SODIUM 1 TABLET: 50; 8.6 TABLET ORAL at 20:32

## 2021-12-22 RX ADMIN — ENOXAPARIN SODIUM 40 MG: 100 INJECTION SUBCUTANEOUS at 10:11

## 2021-12-22 RX ADMIN — POLYVINYL ALCOHOL, POVIDONE 1 DROP: .5; .6 LIQUID OPHTHALMIC at 10:11

## 2021-12-22 ASSESSMENT — PAIN SCALES - GENERAL: PAINLEVEL_OUTOF10: 0

## 2021-12-22 NOTE — PROGRESS NOTES
Nightly  furosemide (LASIX) tablet 40 mg, 40 mg, Oral, Daily  montelukast (SINGULAIR) tablet 10 mg, 10 mg, Oral, Nightly  pantoprazole (PROTONIX) tablet 40 mg, 40 mg, Oral, QAM AC  prednisoLONE acetate (PRED FORTE) 1 % ophthalmic suspension 1 drop, 1 drop, Left Eye, 4x Daily  rOPINIRole (REQUIP) tablet 3 mg, 3 mg, Oral, Nightly  sennosides-docusate sodium (SENOKOT-S) 8.6-50 MG tablet 1 tablet, 1 tablet, Oral, Nightly  atorvastatin (LIPITOR) tablet 10 mg, 10 mg, Oral, Nightly  clopidogrel (PLAVIX) tablet 75 mg, 75 mg, Oral, Nightly  sodium chloride flush 0.9 % injection 5-40 mL, 5-40 mL, IntraVENous, 2 times per day  sodium chloride flush 0.9 % injection 5-40 mL, 5-40 mL, IntraVENous, PRN  0.9 % sodium chloride infusion, 25 mL, IntraVENous, PRN  enoxaparin (LOVENOX) injection 40 mg, 40 mg, SubCUTAneous, Daily  ondansetron (ZOFRAN-ODT) disintegrating tablet 4 mg, 4 mg, Oral, Q8H PRN **OR** ondansetron (ZOFRAN) injection 4 mg, 4 mg, IntraVENous, Q6H PRN  polyethylene glycol (GLYCOLAX) packet 17 g, 17 g, Oral, Daily PRN  acetaminophen (TYLENOL) tablet 650 mg, 650 mg, Oral, Q6H PRN **OR** acetaminophen (TYLENOL) suppository 650 mg, 650 mg, Rectal, Q6H PRN  aspirin EC tablet 81 mg, 81 mg, Oral, Nightly    Medications Prior to Admission:   atorvastatin (LIPITOR) 10 MG tablet, Take 10 mg by mouth nightly  prednisoLONE acetate (PRED FORTE) 1 % ophthalmic suspension, Place 1 drop into the left eye 4 times daily  dextran 70-hypromellose (TEARS PURE) 0.1-0.3 % SOLN opthalmic solution, Place 1 drop into both eyes every 4 hours  VITAMIN D PO, Take 1 tablet by mouth nightly   Ferrous Sulfate (IRON PO), Take 1 tablet by mouth nightly   furosemide (LASIX) 40 MG tablet, Take 1 tablet by mouth daily  clopidogrel (PLAVIX) 75 MG tablet, Take 1 tablet by mouth daily (Patient taking differently: Take 75 mg by mouth nightly )  aspirin 81 MG EC tablet, Take 1 tablet by mouth daily (Patient taking differently: Take 81 mg by mouth nightly )  sennosides-docusate sodium (SENOKOT-S) 8.6-50 MG tablet, Take 1 tablet by mouth nightly   cyclobenzaprine (FLEXERIL) 10 MG tablet, Take 10 mg by mouth nightly   DULoxetine (CYMBALTA) 60 MG extended release capsule, Take 60 mg by mouth nightly   rOPINIRole (REQUIP) 3 MG tablet, Take 3 mg by mouth nightly   montelukast (SINGULAIR) 10 MG tablet, Take 10 mg by mouth nightly. omeprazole (PRILOSEC) 20 MG capsule, Take 20 mg by mouth nightly. albuterol (PROAIR HFA) 108 (90 BASE) MCG/ACT inhaler, Inhale 2 puffs into the lungs every 6 hours as needed. Allergies   Allergen Reactions    Morphine Hives and Nausea And Vomiting    Scopolamine Nausea Only    Adhesive Tape Other (See Comments)     Red skin      Ampicillin Hives, Itching and Nausea Only    Nitrofurantoin     Percocet [Oxycodone-Acetaminophen]     Propoxyphene     Vicodin [Hydrocodone-Acetaminophen]     Codeine Itching and Anxiety     \"Goes nuts\"     ROS:  Constitutional- No weight loss or fevers  Eyes- No diplopia. No photophobia. Ears/nose/throat- No dysphagia. No Dysarthria  Cardiovascular- No palpitations. No chest pain  Respiratory- No dyspnea. No Cough  Gastrointestinal- No Abdominal pain. No Vomiting. Genitourinary- No incontinence. No urinary retention  Musculoskeletal- No myalgia. No arthralgia  Skin- No rash. No easy bruising. Psychiatric- No depression. No anxiety  Endocrine- No diabetes. No thyroid issues. Hematologic- No bleeding difficulty. No fatigue  Neurologic- No weakness. No Headache. Exam:  Blood pressure (!) 157/82, pulse 73, temperature 97.9 °F (36.6 °C), temperature source Oral, resp. rate 18, height 5' 8\" (1.727 m), weight (!) 315 lb 7.7 oz (143.1 kg), SpO2 96 %. Constitutional    Vital signs: BP, HR, and RR reviewed   General alert, no distress  Eyes: unable to visualize the fundi  Cardiovascular: no peripheral edema. Psychiatric: cooperative with examination, no psychotic behavior noted.   Neurologic  Mental status: loss.  Unclear etiology. Seems less likely to be GCA. Optic neuritis is on the differential which can be seen in autoimmune inflammatory/demyelinating neurologic conditions. She was also found to have some adenopathy and pulmonary nodules which could broaden the differential to include paraneoplastic or sarcoidosis, the latter favored by Pulmonology. Recommendations  1. Continue course of high dose steroids. 2.  We may consider CSF.       Arnie Desai NP  [address] [place] [address] [address] Neurology    A copy of this note was provided for Dr Swapna Arora MD

## 2021-12-22 NOTE — PROGRESS NOTES
Checking on patient Q2H for nutrition needs, hygiene needs, comfort measures, mobility, fall risk interventions, and safe environment. All precautions and interventions in place. Educated patient on use of call light and telephone. Patient verbalizes understanding. Call light/telephone in reach.   Electronically signed by Melvin Horvath RN on 12/22/2021 at 7:55 AM

## 2021-12-22 NOTE — CONSULTS
PATIENT IS SEEN AT THE REQUEST OF DR. Angel for mediastinal lymph nodes, nodules    CONSULTING PHYSICIAN: Pedrito    HISTORY OF PRESENT ILLNESS:  This is a 59 y.o. female who presented to the ED on 12/20 with a CC of vision loss. Per ED notes she has been having visual problems in the left with pain and headache. She was admitted for said issues. CT chest ordered for hilar adenopathy noted on CTA of the head and neck. Consult requested. She has a history of asthma that comes and goes but not other issues. Never smoked. No systemic changes.   Was told her eye was inflamed but not sure why      Established Pulmonologist:  None    PAST MEDICAL HISTORY:  Past Medical History:   Diagnosis Date    Anxiety     Asthma     low O 2 SATS POST-OP    Cancer (HCC)     melanoma    GERD (gastroesophageal reflux disease)     Herniated lumbar intervertebral disc     l4    IBS (irritable bowel syndrome)     Migraine     Nausea & vomiting     OA (osteoarthritis)     Sleep apnea     PT STATES \"PROBABLE\"    UTI (urinary tract infection)     FREQUENT       PAST SURGICAL HISTORY:  Past Surgical History:   Procedure Laterality Date    BREAST LUMPECTOMY      CHOLECYSTECTOMY, LAPAROSCOPIC      ENDOMETRIAL ABLATION      HYSTERECTOMY      JOINT REPLACEMENT      BILAT KNEES    LUMBAR SPINE SURGERY      stimulator in place    OTHER SURGICAL HISTORY  6/20/11    lap gastric sleeve    OTHER SURGICAL HISTORY Right 09/01/2017    RIGHT PATELLA REVISION      PAIN MANAGEMENT PROCEDURE Bilateral 8/27/2020    BILATERAL SACROILIAC JOINT INJECTION SITE CONFIRMED BY FLUOROSCOPY performed by Domitila Brandt MD at [address] [facility] Bilateral 10/22/2020    BILATERAL SACROILIAC JOINT INJECTION SITE CONFIRMED BY FLUOROSCOPY performed by Domitila Brandt MD at [facility] [facility] Left 1/21/2021    LEFT SACRAL ONE EPIDURAL STEROID INJECTION SITE CONFIRMED BY FLUOROSCOPY performed by Macie Aguila MD at [address] [place] [address]  4-16-10    resection of open wound and mass of suprapubic area    TUBAL LIGATION         FAMILY HISTORY:  family history includes Cancer in her mother; Diabetes in her sister; Heart Disease in her father, paternal aunt, and paternal uncle; High Blood Pressure in her brother, father, and sister; Stroke in her father. SOCIAL HISTORY:   reports that she has never smoked. She has never used smokeless tobacco.    Scheduled Meds:   methylPREDNISolone  1,000 mg IntraVENous Daily    polyvinyl alcohol-povidone  1 drop Both Eyes Q4H    DULoxetine  60 mg Oral Nightly    furosemide  40 mg Oral Daily    montelukast  10 mg Oral Nightly    pantoprazole  40 mg Oral QAM AC    prednisoLONE acetate  1 drop Left Eye 4x Daily    rOPINIRole  3 mg Oral Nightly    sennosides-docusate sodium  1 tablet Oral Nightly    atorvastatin  10 mg Oral Nightly    clopidogrel  75 mg Oral Nightly    sodium chloride flush  5-40 mL IntraVENous 2 times per day    enoxaparin  40 mg SubCUTAneous Daily    aspirin  81 mg Oral Nightly       Continuous Infusions:   sodium chloride         PRN Meds:  albuterol sulfate HFA, sodium chloride flush, sodium chloride, ondansetron **OR** ondansetron, polyethylene glycol, acetaminophen **OR** acetaminophen    ALLERGIES:  Patient is allergic to morphine, scopolamine, adhesive tape, ampicillin, nitrofurantoin, percocet [oxycodone-acetaminophen], propoxyphene, vicodin [hydrocodone-acetaminophen], and codeine. REVIEW OF SYSTEMS:  Constitutional: Negative for fever or chills  HENT: Negative for sore throat  Eyes: Visual changes (slightly better today)  Respiratory:  On and off wheezing  Cardiovascular: Negative for chest pain  Gastrointestinal: Negative for vomiting, diarrhea   Genitourinary: Negative for hematuria, negative for dysuria  Musculoskeletal: Negative for arthralgias   Skin: Negative for rash  Neurological: Negative for syncope  Hematological: Negative for adenopathy  Extremities:  Chronic LE swelling     PHYSICAL EXAM:  Blood pressure (!) 157/82, pulse 73, temperature 97.9 °F (36.6 °C), temperature source Oral, resp. rate 18, height 5' 8\" (1.727 m), weight (!) 315 lb 7.7 oz (143.1 kg), SpO2 96 %.'  Gen: No distress. Eyes: PERRL. No sclera icterus. No conjunctival injection. ENT: No discharge. Pharynx clear. Mallampati IV  Neck: Trachea midline. No obvious mass. Resp: Diminished  CV: Regular rate. Regular rhythm. No murmur or rub. GI: Non-tender. Non-distended. No hernia. BS present. Skin: Warm and dry. No nodule on exposed extremities. Lymph: No cervical LAD. No supraclavicular LAD. M/S: No cyanosis. No joint deformity. Neuro: Awake. Alert. Moves all four extremities. EXT:   + edema, no clubbing    LABS:  CBC:   Recent Labs     12/20/21  1630 12/21/21  0611   WBC 6.5 4.8   HGB 12.2 11.2*   HCT 39.4 35.0*   MCV 74.7* 74.3*    234     BMP:   Recent Labs     12/20/21  1630 12/21/21  0611    140   K 4.2 4.1    104   CO2 25 24   BUN 9 7   CREATININE 0.6 <0.5*     LIVER PROFILE:   Recent Labs     12/21/21  0611   AST 12*   ALT 14   BILITOT 0.3   ALKPHOS 95     PT/INR:   Recent Labs     12/21/21  0611   PROTIME 12.1   INR 1.07     APTT:   Recent Labs     12/21/21  0611   APTT 35.4     UA:No results for input(s): NITRITE, COLORU, PHUR, LABCAST, WBCUA, RBCUA, MUCUS, TRICHOMONAS, YEAST, BACTERIA, CLARITYU, SPECGRAV, LEUKOCYTESUR, UROBILINOGEN, BILIRUBINUR, BLOODU, GLUCOSEU, AMORPHOUS in the last 72 hours. Invalid input(s): KETONESU  No results for input(s): PHART, WNW2TAC, PO2ART in the last 72 hours. Cultures:  None    PFTs:  None       ECHO:  Normal left ventricular size. Moderate concentric left ventricular   hypertrophy. Normal left ventricular systolic function with an estimated ejection   fraction of 65%. There is a 35 mm pfo closure device that appears well seated.    A bubble study was performed and fails to show evidence of shunting. ABG:  None      Chest CT:  Chest imaging was reviewed by me and showed mediastinal adenopathy, small bilateral nodules, small areas of emphysema     I reviewed all the above labs and studies pertaining to this visit. ASSESSMENT/PLAN:  · Mediastinal Adenopathy (was present on CTA of neck 12/20 in the paratracheal region per my review, unable to say if in hilar region as it did not go far enough into the lungs)  · Unclear. Favor sarcoidosis (especilly with ocular involvement) vs melanoma (history of this) vs lymphoma vs benign/reactive  · Unable to do biopsy due to plavix and aspirin. She would prefer to wait until after holidays as she has trips planned  · I will arrange a follow up in January to plan this. · Pulmonary Nodules   · I don't feel these are necessarily mets. May not even be related to the nodes. Could be granulomas due to sarcoidosis as well.     · Asthma  · TALI  · Uses home CPAP    DVT prophylaxis  Increase lovenox to bid due to BMI    Outpatient EBUS after holidays as she is on anti-coagulation     Sandra Villasenor DO  [facility]

## 2021-12-22 NOTE — PROGRESS NOTES
Hospitalist Progress Note      PCP: Dmitry Simpson    Date of Admission: 12/20/2021    Chief Complaint: left eye vision loss. Hospital Course:    59 y.o. female with PMHx of anxiety, asthma, melanoma, GERD presented to [facility] with 2-week history of vision loss to the left eye. Patient reports this was gradual over the last 2 weeks. Now almost complete loss of vision. Admitted for investigation and treatment of possible temporal arteritis. Followed by neurology and vascular surgery. Was incidentally found to have mediastinal LN with multiple lung nodules. Subjective:   Patient reported slight improvement of central vision in the left eye.      Medications:  Reviewed    Infusion Medications    sodium chloride       Scheduled Medications    enoxaparin  40 mg SubCUTAneous BID    methylPREDNISolone  1,000 mg IntraVENous Daily    polyvinyl alcohol-povidone  1 drop Both Eyes Q4H    DULoxetine  60 mg Oral Nightly    furosemide  40 mg Oral Daily    montelukast  10 mg Oral Nightly    pantoprazole  40 mg Oral QAM AC    prednisoLONE acetate  1 drop Left Eye 4x Daily    rOPINIRole  3 mg Oral Nightly    sennosides-docusate sodium  1 tablet Oral Nightly    atorvastatin  10 mg Oral Nightly    clopidogrel  75 mg Oral Nightly    sodium chloride flush  5-40 mL IntraVENous 2 times per day    aspirin  81 mg Oral Nightly     PRN Meds: albuterol sulfate HFA, sodium chloride flush, sodium chloride, ondansetron **OR** ondansetron, polyethylene glycol, acetaminophen **OR** acetaminophen      Intake/Output Summary (Last 24 hours) at 12/22/2021 1424  Last data filed at 12/22/2021 1129  Gross per 24 hour   Intake 300 ml   Output --   Net 300 ml       Physical Exam Performed:    BP (!) 157/82   Pulse 74   Temp 97.9 °F (36.6 °C) (Oral)   Resp 18   Ht 5' 8\" (1.727 m)   Wt (!) 315 lb 7.7 oz (143.1 kg)   SpO2 96%   BMI 47.97 kg/m²     General appearance: No apparent distress, appears stated age and cooperative. HEENT: Pupils equal, round, and reactive to light. Conjunctivae/corneas clear. Neck: Supple, with full range of motion. No jugular venous distention. Trachea midline. Respiratory:  Normal respiratory effort. Clear to auscultation, bilaterally without Rales/Wheezes/Rhonchi. Cardiovascular: Regular rate and rhythm with normal S1/S2 without murmurs, rubs or gallops. Abdomen: Soft, non-tender, non-distended with normal bowel sounds. Musculoskeletal: No clubbing, cyanosis or edema bilaterally. Full range of motion without deformity. Skin: Skin color, texture, turgor normal.  No rashes or lesions. Neurologic:    - left eye with decreased viusal acuity > peripheral.   - sensation and power are intact. - remainder of exam is unremarkable . Psychiatric: Alert and oriented, thought content appropriate, normal insight  Capillary Refill: Brisk,3 seconds, normal   Peripheral Pulses: +2 palpable, equal bilaterally       Labs:   Recent Labs     12/20/21  1630 12/21/21  0611   WBC 6.5 4.8   HGB 12.2 11.2*   HCT 39.4 35.0*    234     Recent Labs     12/20/21  1630 12/21/21  0611    140   K 4.2 4.1    104   CO2 25 24   BUN 9 7   CREATININE 0.6 <0.5*   CALCIUM 9.5 9.5     Recent Labs     12/21/21  0611   AST 12*   ALT 14   BILITOT 0.3   ALKPHOS 95     Recent Labs     12/21/21  0611   INR 1.07     No results for input(s): Cherre Gash in the last 72 hours. Urinalysis:      Lab Results   Component Value Date    NITRU Negative 12/02/2021    WBCUA 0-2 12/02/2021    BACTERIA 1+ 12/02/2021    RBCUA 0-2 12/02/2021    BLOODU Negative 12/02/2021    SPECGRAV >=1.030 12/02/2021    GLUCOSEU Negative 12/02/2021       Radiology:  VL DUP CAROTID BILATERAL   Final Result      CT CHEST W CONTRAST   Final Result   Abnormal mediastinal adenopathy and scattered subcentimeter noncalcified   pulmonary nodules raising the question of underlying metastatic disease.       RECOMMENDATIONS:   Unavailable CT HEAD WO CONTRAST   Final Result   No CT evidence of an acute infarct. No flow limiting stenosis or large vessel occlusion visualized within the   head or neck. Incidentally noted mediastinal adenopathy, new when compared to a prior exam   obtained on 12/10/2020. Advise CT of the chest for further evaluation. RECOMMENDATIONS:   Unavailable         CTA HEAD NECK W CONTRAST   Final Result   No CT evidence of an acute infarct. No flow limiting stenosis or large vessel occlusion visualized within the   head or neck. Incidentally noted mediastinal adenopathy, new when compared to a prior exam   obtained on 12/10/2020. Advise CT of the chest for further evaluation. RECOMMENDATIONS:   Unavailable         CT ABDOMEN PELVIS W IV CONTRAST Additional Contrast? Oral    (Results Pending)           Assessment/Plan:    Active Hospital Problems    Diagnosis     Mediastinal adenopathy [R59.0]     Pulmonary nodules [R91.8]     Vision loss of left eye [H54.62]     TALI (obstructive sleep apnea) [G47.33]      Vision Loss Left Eye concern for giant cell arteritis vs optic neuritis  sent to ED by [facility] for possible temporal arteritis, CT head: no acute infarct, CTA head/neck: no flow limiting stenosis of head or neck   MRI - pt has spinal stimulator CAN NOT have mri. Seen by vascular and neurology, given no headache, modest ESR and normal US, vascular will hold off on biopsy. Given hilar lesion, optic complication of sarcoidosis was raised by pulmonology. Plan :  - continue ASA, statin and plavix  - appreciate neurology input. -pulse steroids , methylprednisolone 1 g daily for 5 days ( Day 2/5). - PPI GI prophylaxis ( patient is already on ). Mediastinal lymphadenopathy. Multiple lung nodules  Scattered multiple scattered subcentimeter pulmonary nodules suspicious of malignancy as well as mediastinal adenopathy. Will order CT abdomen and pelvis to look for a possible primary.   Seen by pulmonology, input highly appriciated, scheduled for EBUS as outpatient. Above maybe secondary to sarcoidosis rather than malignancy.      History of CVA   History of PFO. Closed by device after stroke in 2020    Asthma  - continue inhaler  - continue Singulair     GERD  - continue prilosec.      Anxiety  - continue cymbalta      DVT Prophylaxis: lovenox  Diet: ADULT DIET; Regular  Code Status: Full Code    PT/OT Eval Status: ambulatory.      Dispo - pending clinical improvement ( will communicate to  about possibility for outpatient IV steroids )       Bruce Valles MD

## 2021-12-22 NOTE — PLAN OF CARE
Problem: Falls - Risk of:  Goal: Will remain free from falls  Description: Will remain free from falls  Outcome: Ongoing  Goal: Absence of physical injury  Description: Absence of physical injury  Outcome: Ongoing     Problem: Safety:  Goal: Free from accidental physical injury  Description: Free from accidental physical injury  Outcome: Ongoing  Goal: Free from intentional harm  Description: Free from intentional harm  Outcome: Ongoing     Problem: Daily Care:  Goal: Daily care needs are met  Description: Daily care needs are met  Outcome: Ongoing

## 2021-12-22 NOTE — PLAN OF CARE
Problem: Falls - Risk of:  Goal: Will remain free from falls  Description: Will remain free from falls  12/22/2021 0752 by Melvin Horvath RN  Outcome: Ongoing  Note: Fall risk assessment completed . Fall precautions in place, bed alarm on, side rails 2/4 up, call light in reach, educated pt on calling for assistance when needed, room clear of clutter. Pt verbalized understanding. 12/22/2021 0403 by Sharda Balderas RN  Outcome: Ongoing  Goal: Absence of physical injury  Description: Absence of physical injury  12/22/2021 0752 by Melvin Horvath RN  Outcome: Ongoing  12/22/2021 0403 by Sharda Balderas RN  Outcome: Ongoing     Problem: Safety:  Goal: Free from accidental physical injury  Description: Free from accidental physical injury  12/22/2021 0752 by Melvin Horvath RN  Outcome: Ongoing  12/22/2021 0403 by Sharda Balderas RN  Outcome: Ongoing  Goal: Free from intentional harm  Description: Free from intentional harm  12/22/2021 0752 by Melvin Horvath RN  Outcome: Ongoing  12/22/2021 0403 by Sharda Balderas RN  Outcome: Ongoing     Problem: Daily Care:  Goal: Daily care needs are met  Description: Daily care needs are met  12/22/2021 0752 by Melvin Horvath RN  Outcome: Ongoing  Note: Checking on patient Q2H for nutrition needs, hygiene needs, comfort measures, mobility, fall risk interventions, and safe environment. All precautions and interventions in place. Educated patient on use of call light and telephone. Patient verbalizes understanding. Call light/telephone in reach.     12/22/2021 0403 by Sharda Balderas RN  Outcome: Ongoing     Problem: Discharge Planning:  Goal: Patients continuum of care needs are met  Description: Patients continuum of care needs are met  Outcome: Ongoing

## 2021-12-22 NOTE — PROGRESS NOTES
Patient resting in bed. Alert and oriented x4. Denies blurry vision in right eye, still unable to see out of left eye. Bed in low position. Lost PIV access overnight and unable to regain access. Will pass on to day RN. Call light in reach. Jemal Pearce RN

## 2021-12-22 NOTE — PROGRESS NOTES
[facility]  Progress Note    12/22/2021 8:54 AM    Chief Complaint: Vision Difficulties in Left Eye      Reason for Consultation: Giant Cell Arteritis Evaluation    Subjective: Pt seen resting in bed, reports no change to Left Eye vision, updated on results of Temporal Artery US, questions answered    Vital Signs:  Vitals:    12/21/21 1940 12/22/21 0652 12/22/21 0729 12/22/21 0758   BP: (!) 150/88  (!) 157/82    Pulse: 83  73    Resp: 16 20 18   Temp: 98.1 °F (36.7 °C)  97.9 °F (36.6 °C)    TempSrc: Oral  Oral    SpO2: 98%  93% 96%   Weight:  (!) 315 lb 7.7 oz (143.1 kg)     Height:           I/O:    Intake/Output Summary (Last 24 hours) at 12/22/2021 0854  Last data filed at 12/21/2021 1514  Gross per 24 hour   Intake 240 ml   Output --   Net 240 ml       Physical Exam:   General: no apparent distress, alert and oriented x3, afebrile  Head/Eyes/Ears/Nose/Throat: normocephalic, atraumatic, face symmetric, denies temporal tenderness - reports numbness, impaired central vision, intact peripheral vision of Left Eye  Chest/Lungs: no accessory muscle use  Cardiac: regular rate and rhythm  Abdomen: soft, non-tender  Extremities: warm and well perfused, no signs of cyanosis or ischemia, +1 edema to RLE, +2 edema to LLE, bilateral upper and lower extremity motorsensory intact    Labs:   Lab Results   Component Value Date     12/21/2021    K 4.1 12/21/2021     12/21/2021    CO2 24 12/21/2021    BUN 7 12/21/2021    CREATININE <0.5 12/21/2021    GFRAA >60 12/21/2021    GFRAA 114 06/07/2011    LABGLOM >60 12/21/2021    GLUCOSE 103 12/21/2021    CALCIUM 9.5 12/21/2021     Lab Results   Component Value Date    WBC 4.8 12/21/2021    RBC 4.70 12/21/2021    HGB 11.2 12/21/2021    HCT 35.0 12/21/2021    MCV 74.3 12/21/2021    RDW 18.1 12/21/2021     12/21/2021     Lab Results   Component Value Date    INR 1.07 12/21/2021    PROTIME 12.1 12/21/2021        Scheduled Meds:    methylPREDNISolone  1,000 mg IntraVENous Daily    polyvinyl alcohol-povidone  1 drop Both Eyes Q4H    DULoxetine  60 mg Oral Nightly    furosemide  40 mg Oral Daily    montelukast  10 mg Oral Nightly    pantoprazole  40 mg Oral QAM AC    prednisoLONE acetate  1 drop Left Eye 4x Daily    rOPINIRole  3 mg Oral Nightly    sennosides-docusate sodium  1 tablet Oral Nightly    atorvastatin  10 mg Oral Nightly    clopidogrel  75 mg Oral Nightly    sodium chloride flush  5-40 mL IntraVENous 2 times per day    enoxaparin  40 mg SubCUTAneous Daily    aspirin  81 mg Oral Nightly     Continuous Infusions:    sodium chloride         Imaging:  Bilateral Carotid Duplex - Temporal Artery Evaluation - 12/21/2021  Summary     Patent bilateral temporal arteries with no consistent evidence of increased intima-medial thickness in both vessels. Possible slight thickening appreciated in the left temporal artery but normal lumen diameter suggests this is likely an insignificant finding. Assessment:   -Left Eye Central Vision loss - peripheral vision intact  -Denies Temporal Tenderness - reports numbness  -CVA - December 2020  -Atrial-Septal Defect - closed on 12/2/2021 with Dr. Greg Tenorio  -Duplex without overt evidence of inflammation  -Sed Rate - 26    Plan:  -No further Vascular Surgery interventions planned    We will sign off for now, but please do not hesitate to contact us with any questions. Thank you for the consultation. All pertinent information and plan of care discussed with Dr. Ross Gamboa. All questions and concerns were addressed with the patient. I have discussed the above stated plan with patient. The patient verbalized understanding and agreed with the plan.     Thank you for allowing to us to participate in the care of Jonathan Holm      Electronically signed by KEISHA Boyd - CNP on 12/22/2021 at 8:54 AM

## 2021-12-23 VITALS
SYSTOLIC BLOOD PRESSURE: 157 MMHG | TEMPERATURE: 97.9 F | OXYGEN SATURATION: 95 % | HEART RATE: 103 BPM | BODY MASS INDEX: 44.41 KG/M2 | HEIGHT: 68 IN | DIASTOLIC BLOOD PRESSURE: 98 MMHG | RESPIRATION RATE: 22 BRPM | WEIGHT: 293 LBS

## 2021-12-23 LAB
ALBUMIN CSF: 21.4 MG/DL (ref 10–30)
ALBUMIN SERPL-MCNC: 3723 MG/DL (ref 3400–5000)
ANTI-NUCLEAR ANTIBODY (ANA): NEGATIVE
CSF INTERPRETATION: NORMAL
IGG CSF: 2.2 MG/DL (ref 0–6)
IGG INDEX: 0.44 (ref 0.2–0.7)
IGG SYNTHESIS RATE CSF: -3.3 MG/DAY (ref -9.9–3.3)
IGG: 865 MG/DL (ref 700–1600)
OLIGOCLONAL BANDS CSF: 5
OLIGOCLONAL BANDS: 5
PROTEIN CSF: 32 MG/DL (ref 15–45)
TOTAL SYPHILLIS IGG/IGM: NORMAL

## 2021-12-23 PROCEDURE — 2580000003 HC RX 258: Performed by: STUDENT IN AN ORGANIZED HEALTH CARE EDUCATION/TRAINING PROGRAM

## 2021-12-23 PROCEDURE — 94761 N-INVAS EAR/PLS OXIMETRY MLT: CPT

## 2021-12-23 PROCEDURE — 2580000003 HC RX 258: Performed by: NURSE PRACTITIONER

## 2021-12-23 PROCEDURE — 6370000000 HC RX 637 (ALT 250 FOR IP): Performed by: NURSE PRACTITIONER

## 2021-12-23 PROCEDURE — 6360000002 HC RX W HCPCS: Performed by: STUDENT IN AN ORGANIZED HEALTH CARE EDUCATION/TRAINING PROGRAM

## 2021-12-23 RX ORDER — PREDNISONE 50 MG/1
1250 TABLET ORAL DAILY
Qty: 50 TABLET | Refills: 0 | Status: SHIPPED | OUTPATIENT
Start: 2021-12-24 | End: 2021-12-26

## 2021-12-23 RX ADMIN — Medication 10 ML: at 09:23

## 2021-12-23 RX ADMIN — SODIUM CHLORIDE 1000 MG: 9 INJECTION, SOLUTION INTRAVENOUS at 11:51

## 2021-12-23 RX ADMIN — PREDNISOLONE ACETATE 1 DROP: 10 SUSPENSION/ DROPS OPHTHALMIC at 11:52

## 2021-12-23 RX ADMIN — PREDNISOLONE ACETATE 1 DROP: 10 SUSPENSION/ DROPS OPHTHALMIC at 09:22

## 2021-12-23 RX ADMIN — POLYVINYL ALCOHOL, POVIDONE 1 DROP: .5; .6 LIQUID OPHTHALMIC at 09:22

## 2021-12-23 RX ADMIN — POLYVINYL ALCOHOL, POVIDONE 1 DROP: .5; .6 LIQUID OPHTHALMIC at 01:00

## 2021-12-23 RX ADMIN — FUROSEMIDE 40 MG: 40 TABLET ORAL at 09:22

## 2021-12-23 RX ADMIN — POLYVINYL ALCOHOL, POVIDONE 1 DROP: .5; .6 LIQUID OPHTHALMIC at 04:56

## 2021-12-23 RX ADMIN — PANTOPRAZOLE SODIUM 40 MG: 40 TABLET, DELAYED RELEASE ORAL at 05:38

## 2021-12-23 RX ADMIN — POLYVINYL ALCOHOL, POVIDONE 1 DROP: .5; .6 LIQUID OPHTHALMIC at 11:52

## 2021-12-23 RX ADMIN — SODIUM CHLORIDE 25 ML: 9 INJECTION, SOLUTION INTRAVENOUS at 11:51

## 2021-12-23 ASSESSMENT — PAIN SCALES - GENERAL: PAINLEVEL_OUTOF10: 0

## 2021-12-23 NOTE — PLAN OF CARE
Problem: Falls - Risk of:  Goal: Will remain free from falls  Description: Will remain free from falls  Outcome: Ongoing  Note: Fall risk assessment completed. Fall precautions in place. Call light within reach. Pt educated on calling for assistance. Walkway free of clutter. Will continue to monitor. Goal: Absence of physical injury  Description: Absence of physical injury  Outcome: Ongoing  Note: Pt is free of injury. No injury noted. Fall precautions in place. Call light within reach. Will monitor. Problem: Safety:  Goal: Free from accidental physical injury  Description: Free from accidental physical injury  Outcome: Ongoing  Note: Pt is free of injury. No injury noted. Fall precautions in place. Call light within reach. Will monitor. Goal: Free from intentional harm  Description: Free from intentional harm  Outcome: Ongoing  Note: Pt is free of intentional harm. No intentions noted. Will monitor. Problem: Daily Care:  Goal: Daily care needs are met  Description: Daily care needs are met  Outcome: Ongoing  Note: Pt daily care needs are being met by patient and staff. Will monitor. Problem: Discharge Planning:  Goal: Patients continuum of care needs are met  Description: Patients continuum of care needs are met  Outcome: Ongoing  Note: Pt FAIZAN needs are being met. SW and medical team following. Will monitor.

## 2021-12-23 NOTE — PROGRESS NOTES
Neurology Progress Note    Updates  No significant events overnight. Her vision is improving.       Medical History:  Past Medical History:   Diagnosis Date    Anxiety     Asthma     low O 2 SATS POST-OP    Cancer (HCC)     melanoma    GERD (gastroesophageal reflux disease)     Herniated lumbar intervertebral disc     l4    IBS (irritable bowel syndrome)     Migraine     Nausea & vomiting     OA (osteoarthritis)     Sleep apnea     PT STATES \"PROBABLE\"    UTI (urinary tract infection)     FREQUENT     Past Surgical History:   Procedure Laterality Date    BREAST LUMPECTOMY      CHOLECYSTECTOMY, LAPAROSCOPIC      ENDOMETRIAL ABLATION      HYSTERECTOMY      JOINT REPLACEMENT      BILAT KNEES    LUMBAR SPINE SURGERY      stimulator in place    OTHER SURGICAL HISTORY  6/20/11    lap gastric sleeve    OTHER SURGICAL HISTORY Right 09/01/2017    RIGHT PATELLA REVISION      PAIN MANAGEMENT PROCEDURE Bilateral 8/27/2020    BILATERAL SACROILIAC JOINT INJECTION SITE CONFIRMED BY FLUOROSCOPY performed by Skip Luu MD at [address] [place] [address] Bilateral 10/22/2020    BILATERAL SACROILIAC JOINT INJECTION SITE CONFIRMED BY FLUOROSCOPY performed by Skip Luu MD at [address] [place] [address] Left 1/21/2021    LEFT SACRAL ONE EPIDURAL STEROID INJECTION SITE CONFIRMED BY FLUOROSCOPY performed by Skip Luu MD at [address] [place] [address]    resection of open wound and mass of suprapubic area    TUBAL LIGATION       Current Facility-Administered Medications:   [Held by provider] enoxaparin (LOVENOX) injection 40 mg, 40 mg, SubCUTAneous, BID  methylPREDNISolone sodium (SOLU-MEDROL) 1,000 mg in sodium chloride 0.9 % 250 mL IVPB, 1,000 mg, IntraVENous, Daily  albuterol sulfate  (90 Base) MCG/ACT inhaler 2 puff, 2 puff, Inhalation, Q6H PRN  polyvinyl alcohol-povidone 5-6 MG/ML opthalmic solution 1 drop, 1 drop, Both Eyes, Q4H  DULoxetine (CYMBALTA) extended release capsule 60 mg, 60 mg, Oral, Nightly  furosemide (LASIX) tablet 40 mg, 40 mg, Oral, Daily  montelukast (SINGULAIR) tablet 10 mg, 10 mg, Oral, Nightly  pantoprazole (PROTONIX) tablet 40 mg, 40 mg, Oral, QAM AC  prednisoLONE acetate (PRED FORTE) 1 % ophthalmic suspension 1 drop, 1 drop, Left Eye, 4x Daily  rOPINIRole (REQUIP) tablet 3 mg, 3 mg, Oral, Nightly  sennosides-docusate sodium (SENOKOT-S) 8.6-50 MG tablet 1 tablet, 1 tablet, Oral, Nightly  atorvastatin (LIPITOR) tablet 10 mg, 10 mg, Oral, Nightly  [Held by provider] clopidogrel (PLAVIX) tablet 75 mg, 75 mg, Oral, Nightly  sodium chloride flush 0.9 % injection 5-40 mL, 5-40 mL, IntraVENous, 2 times per day  sodium chloride flush 0.9 % injection 5-40 mL, 5-40 mL, IntraVENous, PRN  0.9 % sodium chloride infusion, 25 mL, IntraVENous, PRN  ondansetron (ZOFRAN-ODT) disintegrating tablet 4 mg, 4 mg, Oral, Q8H PRN **OR** ondansetron (ZOFRAN) injection 4 mg, 4 mg, IntraVENous, Q6H PRN  polyethylene glycol (GLYCOLAX) packet 17 g, 17 g, Oral, Daily PRN  acetaminophen (TYLENOL) tablet 650 mg, 650 mg, Oral, Q6H PRN **OR** acetaminophen (TYLENOL) suppository 650 mg, 650 mg, Rectal, Q6H PRN  aspirin EC tablet 81 mg, 81 mg, Oral, Nightly    Medications Prior to Admission:   atorvastatin (LIPITOR) 10 MG tablet, Take 10 mg by mouth nightly  prednisoLONE acetate (PRED FORTE) 1 % ophthalmic suspension, Place 1 drop into the left eye 4 times daily  dextran 70-hypromellose (TEARS PURE) 0.1-0.3 % SOLN opthalmic solution, Place 1 drop into both eyes every 4 hours  VITAMIN D PO, Take 1 tablet by mouth nightly   Ferrous Sulfate (IRON PO), Take 1 tablet by mouth nightly   furosemide (LASIX) 40 MG tablet, Take 1 tablet by mouth daily  clopidogrel (PLAVIX) 75 MG tablet, Take 1 tablet by mouth daily (Patient taking differently: Take 75 mg by mouth nightly )  aspirin 81 MG EC tablet, Take 1 tablet by mouth daily (Patient taking differently: Take 81 mg by mouth nightly )  sennosides-docusate sodium (SENOKOT-S) 8.6-50 MG tablet, Take 1 tablet by mouth nightly   cyclobenzaprine (FLEXERIL) 10 MG tablet, Take 10 mg by mouth nightly   DULoxetine (CYMBALTA) 60 MG extended release capsule, Take 60 mg by mouth nightly   rOPINIRole (REQUIP) 3 MG tablet, Take 3 mg by mouth nightly   montelukast (SINGULAIR) 10 MG tablet, Take 10 mg by mouth nightly. omeprazole (PRILOSEC) 20 MG capsule, Take 20 mg by mouth nightly. albuterol (PROAIR HFA) 108 (90 BASE) MCG/ACT inhaler, Inhale 2 puffs into the lungs every 6 hours as needed. Allergies   Allergen Reactions    Morphine Hives and Nausea And Vomiting    Scopolamine Nausea Only    Adhesive Tape Other (See Comments)     Red skin      Ampicillin Hives, Itching and Nausea Only    Nitrofurantoin     Percocet [Oxycodone-Acetaminophen]     Propoxyphene     Vicodin [Hydrocodone-Acetaminophen]     Codeine Itching and Anxiety     \"Goes nuts\"     ROS:  Constitutional- No weight loss or fevers  Eyes- No diplopia. No photophobia. Ears/nose/throat- No dysphagia. No Dysarthria  Cardiovascular- No palpitations. No chest pain  Respiratory- No dyspnea. No Cough  Gastrointestinal- No Abdominal pain. No Vomiting. Genitourinary- No incontinence. No urinary retention  Musculoskeletal- No myalgia. No arthralgia  Skin- No rash. No easy bruising. Psychiatric- No depression. No anxiety  Endocrine- No diabetes. No thyroid issues. Hematologic- No bleeding difficulty. No fatigue  Neurologic- No weakness. No Headache. Exam:  Blood pressure (!) 157/98, pulse 163, temperature 97.9 °F (36.6 °C), temperature source Oral, resp. rate 22, height 5' 8\" (1.727 m), weight (!) 322 lb 15.6 oz (146.5 kg), SpO2 95 %. Constitutional    Vital signs: BP, HR, and RR reviewed   General alert, no distress  Eyes: unable to visualize the fundi  Cardiovascular: no peripheral edema.   Psychiatric: cooperative with examination, no psychotic behavior noted. Neurologic  Mental status:   orientation to person, place   Attention intact as able to attend well to the exam     Language fluent in conversation   Comprehension intact; follows simple commands  Cranial nerves:   CN2: visual fields full in R eye. Periphery of L eye remains intact. Central vision is improving. She is capable of reading the time appropriately on the wall clock. CN 3,4,6: extraocular muscles intact. CN7: face symmetric without dysarthria  CN8: hearing grossly intact  CN12: tongue midline with protrusion  Strength: good strength in all 4 extremities   Sensory: light touch intact in all 4 extremities. Cerebellar/coordination: finger nose finger normal without ataxia  Tone: normal in all 4 extremities  Gait: deferred at this time for comfort. Labs  CRP 8.2  ESR 26    HgA1c 5.7  LDL 59    Folate 14.77  B12 > 2000    TSH Reflex FT4 - 0.92    CSF 12/22/21  Tube 1 - 10 WBC, 36 RBC  Tube 3 - 10 WBC, 7 RBC    Protein 32  Glucose 107    IgG index 0.44   IgG Synthesis Rate, CSF -3.3   IgG, CSF 2.20     Studies  Carotid US 12/21/21   Patent bilateral temporal arteries with no consistent evidence of increased    intima-medial thickness in both vessels. Possible slight thickening    appreciated in the left temporal artery but normal lumen diameter suggests    this is likely an insignificant finding. CT head w/o 12/20/21  No acute intracranial abnormality. CTA head/neck 12/20/21  No flow limiting stenosis or large vessel occlusion visualized within the   head or neck. Incidentally noted mediastinal adenopathy, new when compared to a prior exam   obtained on 12/10/2020.  Advise CT of the chest for further evaluation. CT chest 12/21/21  Abnormal mediastinal adenopathy and scattered subcentimeter noncalcified   pulmonary nodules raising the question of underlying metastatic disease. Limited TTE 12/2/21   Normal left ventricular size.  Moderate concentric left ventricular hypertrophy. Normal left ventricular systolic function with an estimated ejection fraction of 65%. There is a 35 mm pfo closure device that appears well seated. A bubble study was performed and fails to show evidence of shunting. Impression  1. Progressive monocular (L) vision loss. This is improving w/ high dose steroids. Optic neuritis is on the differential which can be seen in autoimmune inflammatory/demyelinating neurologic conditions. She was also found to have some adenopathy and pulmonary nodules which could broaden the differential to include paraneoplastic or sarcoidosis, the latter favored by Pulmonology. Recommendations  1. Continue 5 day course of high dose steroids. If being discharged home she was agreeable to take 1250 mg prednisone PO daily for the final 2 days. 2.  If any worsening of symptoms she was advised to call her eye doctor or come back to the ED. 3.  Preliminary CSF is unrevealing. The remainder of these studies, along w/ serum labs, can be followed up outpatient. 4.  She can follow up w/ Dr. Maciel Hyman in 1 month. She also has plans to see neuro ophthalmology which is appropriate. 5.  OT driving evaluation prior to resuming. No objection to discharge if otherwise medically ready. Please call back w/ any additional questions or concerns. Thank you.       Aide Cruz NP  [address] [place] [address] [address] Neurology    A copy of this note was provided for Dr Srinath Olsen MD

## 2021-12-23 NOTE — DISCHARGE SUMMARY
Hospital Medicine Discharge Summary    Patient ID: Tahmina Schreiber      Patient's PCP: Compa Ayala    Admit Date: 12/20/2021     Discharge Date:   12/23/21    Admitting Provider: Irvin Mahajan MD     Discharge Provider: Jorge Lugo MD     Discharge Diagnoses: Active Hospital Problems    Diagnosis     Mediastinal adenopathy [R59.0]     Pulmonary nodules [R91.8]     Vision loss of left eye [H54.62]     TALI (obstructive sleep apnea) [G47.33]        The patient was seen and examined on day of discharge and this discharge summary is in conjunction with any daily progress note from day of discharge. Hospital Course:    59 y. o. female with PMHx of anxiety, asthma, melanoma, GERD presented to [facility] 2-week history of vision loss to the left eye.  Patient reports this was gradual over the last 2 weeks.  Now almost complete loss of vision. Admitted for investigation and treatment of possible temporal arteritis.      Followed by neurology and vascular surgery. Was incidentally found to have mediastinal LN with multiple lung nodules. Vision Loss Left Eye concern for giant cell arteritis vs optic neuritis  sent to ED by [facility] for possible temporal arteritis, CT head: no acute infarct, CTA head/neck: no flow limiting stenosis of head or neck   MRI - pt has spinal stimulator CAN NOT have mri. Seen by vascular and neurology, given no headache, modest ESR and normal US, vascular will hold off on biopsy. Given hilar lesion, optic complication of sarcoidosis was raised by pulmonology. -pulse steroids , methylprednisolone 1 g daily for 5 days ( Day 3/5), will receive 1250 mg prednisone PO daily for the final 2 days. Vision has improved with the start of steroids. CSF done largely unremarkable, follow pending work up in out patient clinic.   - PPI GI prophylaxis ( patient is already on ).    Mediastinal lymphadenopathy.    Multiple lung nodules  Scattered multiple scattered subcentimeter pulmonary nodules suspicious of malignancy as well as mediastinal adenopathy. Will order CT abdomen and pelvis to look for a possible primary. Seen by pulmonology, input highly appriciated, scheduled for EBUS as outpatient. Above maybe secondary to sarcoidosis rather than malignancy.      History of CVA   History of PFO. Closed by device after stroke in 2020     Asthma  - continue inhaler  - continue Singulair     GERD  - continue prilosec.      Anxiety  - continue cymbalta          Physical Exam Performed:     BP (!) 157/98   Pulse 163   Temp 97.9 °F (36.6 °C) (Oral)   Resp 22   Ht 5' 8\" (1.727 m)   Wt (!) 322 lb 15.6 oz (146.5 kg)   SpO2 95%   BMI 49.11 kg/m²     General appearance: No apparent distress, appears stated age and cooperative. HEENT: Pupils equal, round, and reactive to light. Conjunctivae/corneas clear. Neck: Supple, with full range of motion. No jugular venous distention. Trachea midline. Respiratory:  Normal respiratory effort. Clear to auscultation, bilaterally without Rales/Wheezes/Rhonchi. Cardiovascular: Regular rate and rhythm with normal S1/S2 without murmurs, rubs or gallops. Abdomen: Soft, non-tender, non-distended with normal bowel sounds. Musculoskeletal: No clubbing, cyanosis or edema bilaterally. Full range of motion without deformity. Skin: Skin color, texture, turgor normal.  No rashes or lesions. Neurologic:    - left eye with decreased viusal acuity > peripheral.   - sensation and power are intact. - remainder of exam is unremarkable . Psychiatric: Alert and oriented, thought content appropriate, normal insight  Capillary Refill: Brisk,3 seconds, normal   Peripheral Pulses: +2 palpable, equal bilaterally     Labs:  For convenience and continuity at follow-up the following most recent labs are provided:      CBC:    Lab Results   Component Value Date    WBC 4.8 12/21/2021    HGB 11.2 12/21/2021    HCT 35.0 12/21/2021     12/21/2021 Renal:    Lab Results   Component Value Date     12/21/2021    K 4.1 12/21/2021     12/21/2021    CO2 24 12/21/2021    BUN 7 12/21/2021    CREATININE <0.5 12/21/2021    CALCIUM 9.5 12/21/2021         Significant Diagnostic Studies    Radiology:   IR LUMBAR PUNCTURE FOR DIAGNOSIS   Final Result   Successful fluoroscopic-guided lumbar puncture. CT ABDOMEN PELVIS W IV CONTRAST Additional Contrast? Oral   Final Result   1. Colonic diverticulosis without evidence diverticulitis. 2. Bilateral nonobstructive nephrolithiasis. 3. Splenomegaly. RECOMMENDATIONS:   Unavailable         VL DUP CAROTID BILATERAL   Final Result      CT CHEST W CONTRAST   Final Result   Abnormal mediastinal adenopathy and scattered subcentimeter noncalcified   pulmonary nodules raising the question of underlying metastatic disease. RECOMMENDATIONS:   Unavailable         CT HEAD WO CONTRAST   Final Result   No CT evidence of an acute infarct. No flow limiting stenosis or large vessel occlusion visualized within the   head or neck. Incidentally noted mediastinal adenopathy, new when compared to a prior exam   obtained on 12/10/2020. Advise CT of the chest for further evaluation. RECOMMENDATIONS:   Unavailable         CTA HEAD NECK W CONTRAST   Final Result   No CT evidence of an acute infarct. No flow limiting stenosis or large vessel occlusion visualized within the   head or neck. Incidentally noted mediastinal adenopathy, new when compared to a prior exam   obtained on 12/10/2020. Advise CT of the chest for further evaluation. RECOMMENDATIONS:   Unavailable                Consults:     IP CONSULT TO VASCULAR SURGERY  IP CONSULT TO NEUROLOGY  IP CONSULT TO PULMONOLOGY    Disposition:  Home      Condition at Discharge: Stable    Discharge Instructions/Follow-up:    - 1250 mg prednisone PO daily for the next 2 days.    - visual assessment prior to resuming driving   - follow up with pulmonology for biopsy. Code Status:  Full Code     Activity: activity as tolerated    Diet: regular diet      Discharge Medications:     Current Discharge Medication List           Details   predniSONE (DELTASONE) 50 MG tablet Take 25 tablets by mouth daily for 2 days  Qty: 50 tablet, Refills: 0              Details   atorvastatin (LIPITOR) 10 MG tablet Take 10 mg by mouth nightly      prednisoLONE acetate (PRED FORTE) 1 % ophthalmic suspension Place 1 drop into the left eye 4 times daily      dextran 70-hypromellose (TEARS PURE) 0.1-0.3 % SOLN opthalmic solution Place 1 drop into both eyes every 4 hours      VITAMIN D PO Take 1 tablet by mouth nightly       Ferrous Sulfate (IRON PO) Take 1 tablet by mouth nightly       furosemide (LASIX) 40 MG tablet Take 1 tablet by mouth daily  Qty: 30 tablet, Refills: 5      clopidogrel (PLAVIX) 75 MG tablet Take 1 tablet by mouth daily  Qty: 90 tablet, Refills: 3      aspirin 81 MG EC tablet Take 1 tablet by mouth daily  Qty: 30 tablet, Refills: 3      sennosides-docusate sodium (SENOKOT-S) 8.6-50 MG tablet Take 1 tablet by mouth nightly       cyclobenzaprine (FLEXERIL) 10 MG tablet Take 10 mg by mouth nightly       DULoxetine (CYMBALTA) 60 MG extended release capsule Take 60 mg by mouth nightly       rOPINIRole (REQUIP) 3 MG tablet Take 3 mg by mouth nightly       montelukast (SINGULAIR) 10 MG tablet Take 10 mg by mouth nightly. omeprazole (PRILOSEC) 20 MG capsule Take 20 mg by mouth nightly. albuterol (PROAIR HFA) 108 (90 BASE) MCG/ACT inhaler Inhale 2 puffs into the lungs every 6 hours as needed. Time Spent on discharge is more than 20 minutes in the examination, evaluation, counseling and review of medications and discharge plan. Signed:    Bruce Valles MD   12/23/2021      Thank you Bertin Galvin for the opportunity to be involved in this patient's care.  If you have any questions or concerns please feel free to contact me at [phone].

## 2021-12-23 NOTE — PLAN OF CARE
Problem: Falls - Risk of:  Goal: Will remain free from falls  Description: Will remain free from falls  Outcome: Ongoing  Note: Fall risk assessment completed. Fall precautions in place. Call light within reach. Pt educated on calling for assistance before getting up. Walkway free of clutter. Will continue to monitor. Goal: Absence of physical injury  Description: Absence of physical injury  Outcome: Ongoing  Note: Pt is free of injury. No injury noted. Fall precautions in place. Call light within reach. Will monitor. Problem: Safety:  Goal: Free from accidental physical injury  Description: Free from accidental physical injury  Outcome: Ongoing  Note: Pt is free of injury. No injury noted. Fall precautions in place. Call light within reach. Will monitor. Goal: Free from intentional harm  Description: Free from intentional harm  Outcome: Ongoing  Note: Pt is free of intentional harm. No intentions noted. Will monitor.         Problem: Daily Care:  Goal: Daily care needs are met  Description: Daily care needs are met  Outcome: Ongoing  Note: Patients care needs will be met this shift       Problem: Discharge Planning:  Goal: Patients continuum of care needs are met  Description: Patients continuum of care needs are met  Outcome: Ongoing  Note: Patients care needs will be met this shift     Electronically signed by Francisco Rutherford RN on 12/22/2021 at 10:00 PM

## 2021-12-23 NOTE — PROGRESS NOTES
Pt discharged to home. Patient ambulated to main entrance with RN. Accompanied by daughter that is waiting at the main entrance. Transported in personal vehicle. Discharge instructions and personal belongings given to pt. Rx sent electronically to [facility]. Explanation of discharge medications and instructions understood by verbal statement. No questions, comments or concerns at this time.  Electronically signed by Chelsy Rivas RN on 12/23/2021 at 3:15 PM

## 2021-12-23 NOTE — PROGRESS NOTES
Pt A&O in the bed. Pt tolerating PO intake well. AM medications administered without complications. Pt has no complaints of pain, nausea or vomiting. Pt states that she can see the clock on the wall with her left eye. Improving greatly. Will continue to monitor. Pt UAL in the room. Call light within reach. Able to make needs known. Fall precautions in place. Will monitor.  Electronically signed by Corneila Milner RN on 12/23/2021 at 12:34 PM

## 2021-12-24 LAB — ANGIOTENSIN CONVERTING ENZYME: 58 U/L (ref 9–67)

## 2021-12-25 LAB
HIV AG/AB: NORMAL
HIV ANTIGEN: NORMAL
HIV-1 ANTIBODY: NORMAL
HIV-2 AB: NORMAL
METHYLMALONIC ACID: 0.19 UMOL/L (ref 0–0.4)
VDRL CSF SCREEN: NON REACTIVE

## 2021-12-28 LAB
ANGIOTENSIN CONVERTING ENZYME CSF: 1.4 U/L (ref 0–2.5)
Lab: NORMAL
REPORT: NORMAL
THIS TEST SENT TO: NORMAL

## 2021-12-28 NOTE — TELEPHONE ENCOUNTER
Patient has a follow up scheduled 1/14/21 with Dr. Mirian Lamas and the pre-operative clearance can be completed during the office visit since her procedure is not yet scheduled. show

## 2021-12-31 LAB
Lab: NORMAL
REPORT: NORMAL
THIS TEST SENT TO: NORMAL

## 2022-01-06 LAB — REPORT: NORMAL

## 2022-01-12 ENCOUNTER — OFFICE VISIT (OUTPATIENT)
Dept: PULMONOLOGY | Age: 62
End: 2022-01-12
Payer: COMMERCIAL

## 2022-01-12 VITALS
TEMPERATURE: 97.6 F | HEART RATE: 112 BPM | BODY MASS INDEX: 44.41 KG/M2 | RESPIRATION RATE: 16 BRPM | HEIGHT: 68 IN | OXYGEN SATURATION: 99 % | WEIGHT: 293 LBS | SYSTOLIC BLOOD PRESSURE: 140 MMHG | DIASTOLIC BLOOD PRESSURE: 84 MMHG

## 2022-01-12 DIAGNOSIS — R59.0 MEDIASTINAL ADENOPATHY: ICD-10-CM

## 2022-01-12 DIAGNOSIS — G47.33 OSA (OBSTRUCTIVE SLEEP APNEA): ICD-10-CM

## 2022-01-12 DIAGNOSIS — R91.8 PULMONARY NODULES: Primary | ICD-10-CM

## 2022-01-12 PROCEDURE — 99213 OFFICE O/P EST LOW 20 MIN: CPT | Performed by: INTERNAL MEDICINE

## 2022-01-12 ASSESSMENT — ENCOUNTER SYMPTOMS: RESPIRATORY NEGATIVE: 1

## 2022-01-12 NOTE — PROGRESS NOTES
[address] [place], [facility] CRIT[facility]   Pippa Landry (:  1960) is a 59 y.o. female,Established patient, here for evaluation of the following chief complaint(s):  Follow-Up from Hospital (? EBUS)         ASSESSMENT/PLAN:  1. Pulmonary nodules  Assessment & Plan:  -Small scattered, low suspicion for malignancy but will need to be followed.  -Could representSequela of sarcoid pending EMS  2. TALI (obstructive sleep apnea)  3. Mediastinal adenopathy  Assessment & Plan:   -Bulky mediastinal adenopathy has been present since last CT scan of the neck  -Planning for EBUS next week with Dr. Carisa Jensen  -She says she will discuss with Dr. Yoon Torres this week of holding Plavix 5 days prior to procedure date      Return in about 4 weeks (around 2022). Subjective   SUBJECTIVE/OBJECTIVE:  Hospital follow-up for consult for mediastinal adenopathy and small pulmonary nodules. Patient remains asymptomatic from a respiratory standpoint.  -Has scheduled ophthalmology visit this Friday      Review of Systems   Constitutional: Negative. Respiratory: Negative. Cardiovascular: Negative. Musculoskeletal: Negative. Neurological: Negative. Psychiatric/Behavioral: Negative. Objective   Physical Exam    Gen:  No acute distress. Eyes: PERRL. EOMI. Anicteric sclera. No conjunctival injection. ENT: No discharge. oropharynx clear. External appearance of ears and nose normal.  Neck: Trachea midline. No mass   Resp:  No crackles. No wheezes. No rhonchi. No dullness on percussion. CV: Regular rate. Regular rhythm. No murmur or rub. No edema. GI: Soft, Non-tender. Non-distended. +BS  Skin: Warm, dry, w/o erythema. Lymph: No cervical or supraclavicular LAD. M/S: No cyanosis. No clubbing. Neuro:  no focal neurologic deficit. Moves all extremities  Psych: Awake and alert, Oriented x 3. Judgement and insight appropriate. Mood stable.       On this date 2022 I have spent 22 minutes reviewing previous notes, test results and face to face with the patient discussing the diagnosis and importance of compliance with the treatment plan as well as documenting on the day of the visit. An electronic signature was used to authenticate this note.     --Rei Whitmore MD

## 2022-01-12 NOTE — ASSESSMENT & PLAN NOTE
-Small scattered, low suspicion for malignancy but will need to be followed.  -Could representSequela of sarcoid pending EMS

## 2022-01-12 NOTE — ASSESSMENT & PLAN NOTE
-Bulky mediastinal adenopathy has been present since last CT scan of the neck  -Planning for EBUS next week with Dr. Chantel Sharma  -She says she will discuss with Dr. Mica Tarango this week of holding Plavix 5 days prior to procedure date

## 2022-01-13 NOTE — PROGRESS NOTES
[address] [facility] time____________        Surgery time____________    Take the following medications with a sip of water:    Do not eat or drink anything after 12:00 midnight prior to your surgery. This includes water chewing gum, mints and ice chips. You may brush your teeth and gargle the morning of your surgery, but do not swallow the water     Please see your family doctor/pediatrician for a history and physical and/or concerning medications. Bring any test results/reports from your physicians office. If you are under the care of a heart doctor or specialist doctor, please be aware that you may be asked to them for clearance    You may be asked to stop blood thinners such as Coumadin, Plavix, Fragmin, Lovenox, etc., or any anti-inflammatories such as:  Aspirin, Ibuprofen, Advil, Naproxen prior to your surgery. We also ask that you stop any OTC medications such as fish oil, vitamin E, glucosamine, garlic, Multivitamins, COQ 10, etc.    We ask that you do not smoke 24 hours prior to surgery  We ask that you do not  drink any alcoholic beverages 24 hours prior to surgery     You must make arrangements for a responsible adult to take you home after your surgery. For your safety you will not be allowed to leave alone or drive yourself home. Your surgery will be cancelled if you do not have a ride home. Also for your safety, it is strongly suggested that someone stay with you the first 24 hours after your surgery. A parent or legal guardian must accompany a child scheduled for surgery and plan to stay at the hospital until the child is discharged. Please do not bring other children with you. For your comfort, please wear simple loose fitting clothing to the hospital.  Please do not bring valuables.     Do not wear any make-up or nail polish on your fingers or toes      For your safety, please do not wear any jewelry or body piercing's on the day of surgery. All jewelry must be removed. If you have dentures, they will be removed before going to operating room. For your convenience, we will provide you with a container. If you wear contact lenses or glasses, they will be removed, please bring a case for them. If you have a living will and a durable power of  for healthcare, please bring in a copy. As part of our patient safety program to minimize surgical site infections, we ask you to do the following:    · Please notify your surgeon if you develop any illness between         now and the  day of your surgery. · This includes a cough, cold, fever, sore throat, nausea,         or vomiting, and diarrhea, etc.  ·  Please notify your surgeon if you experience dizziness, shortness         of breath or blurred vision between now and the time of your surgery. Do not shave your operative site 96 hours prior to surgery. For face and neck surgery, men may use an electric razor 48 hours   prior to surgery. You may shower the night before surgery or the morning of   your surgery with an antibacterial soap. You will need to bring a photo ID and insurance card    [facility] has an onsite pharmacy, would you like to utilize our pharmacy     If you will be staying overnight and use a C-pap machine, please bring   your C-pap to hospital     Our goal is to provide you with excellent care, therefore, visitors will be limited to two(2) in the room at a time so that we may focus on providing this care for you. Please contact pre-admission testing if you have any further questions. [facility] phone number:  [phone] [facility] fax number:  [phone]  Please note these are generalized instructions for all surgical cases, you may be provided with more specific instructions according to your surgery. Covid testing to be done @  If positive---Pt instructed to notify MD ASAP   If negative--pt was instructed to bring results DOP/DOS

## 2022-01-19 ENCOUNTER — ANESTHESIA EVENT (OUTPATIENT)
Dept: ENDOSCOPY | Age: 62
End: 2022-01-19
Payer: COMMERCIAL

## 2022-01-20 ENCOUNTER — HOSPITAL ENCOUNTER (OUTPATIENT)
Age: 62
Setting detail: OUTPATIENT SURGERY
Discharge: HOME | End: 2022-01-20
Attending: INTERNAL MEDICINE | Admitting: INTERNAL MEDICINE
Payer: COMMERCIAL

## 2022-01-20 ENCOUNTER — ANESTHESIA (OUTPATIENT)
Dept: ENDOSCOPY | Age: 62
End: 2022-01-20
Payer: COMMERCIAL

## 2022-01-20 VITALS
OXYGEN SATURATION: 98 % | HEIGHT: 68 IN | HEART RATE: 106 BPM | BODY MASS INDEX: 44.41 KG/M2 | SYSTOLIC BLOOD PRESSURE: 131 MMHG | WEIGHT: 293 LBS | TEMPERATURE: 97.3 F | RESPIRATION RATE: 16 BRPM | DIASTOLIC BLOOD PRESSURE: 88 MMHG

## 2022-01-20 LAB — SARS-COV-2, NAAT: DETECTED

## 2022-01-20 PROCEDURE — 87635 SARS-COV-2 COVID-19 AMP PRB: CPT

## 2022-01-20 RX ORDER — SODIUM CHLORIDE 0.9 % (FLUSH) 0.9 %
10 SYRINGE (ML) INJECTION PRN
Status: DISCONTINUED | OUTPATIENT
Start: 2022-01-20 | End: 2022-01-20 | Stop reason: HOSPADM

## 2022-01-20 RX ORDER — APREPITANT 40 MG/1
40 CAPSULE ORAL ONCE
Status: CANCELLED | OUTPATIENT
Start: 2022-01-20

## 2022-01-20 RX ORDER — SODIUM CHLORIDE 9 MG/ML
INJECTION, SOLUTION INTRAVENOUS CONTINUOUS
Status: DISCONTINUED | OUTPATIENT
Start: 2022-01-20 | End: 2022-01-20 | Stop reason: HOSPADM

## 2022-01-20 RX ORDER — SODIUM CHLORIDE 0.9 % (FLUSH) 0.9 %
10 SYRINGE (ML) INJECTION EVERY 12 HOURS SCHEDULED
Status: DISCONTINUED | OUTPATIENT
Start: 2022-01-20 | End: 2022-01-20 | Stop reason: HOSPADM

## 2022-01-20 RX ORDER — SODIUM CHLORIDE 9 MG/ML
25 INJECTION, SOLUTION INTRAVENOUS PRN
Status: DISCONTINUED | OUTPATIENT
Start: 2022-01-20 | End: 2022-01-20 | Stop reason: HOSPADM

## 2022-01-20 ASSESSMENT — LIFESTYLE VARIABLES: SMOKING_STATUS: 0

## 2022-01-20 ASSESSMENT — PAIN - FUNCTIONAL ASSESSMENT: PAIN_FUNCTIONAL_ASSESSMENT: 0-10

## 2022-01-20 NOTE — PROGRESS NOTES
Patient had + rapid COVID test prior to procedure. She said her  tested positive on 1/3 and two sons had COVID as well. She feels fine. [facility] policy is to wait 14 days.   Will have to reschedule her DO Fernando [facility]  [phone]

## 2022-01-20 NOTE — ANESTHESIA PRE PROCEDURE
Diagnosis Date    Anxiety     Asthma     low O 2 SATS POST-OP    Cancer (HCC)     melanoma    GERD (gastroesophageal reflux disease)     Herniated lumbar intervertebral disc     l4    IBS (irritable bowel syndrome)     Migraine     Nausea & vomiting     OA (osteoarthritis)     Sleep apnea     PT STATES \"PROBABLE\"    UTI (urinary tract infection)     FREQUENT       Past Surgical History:        Procedure Laterality Date    BREAST LUMPECTOMY      CHOLECYSTECTOMY, LAPAROSCOPIC      ENDOMETRIAL ABLATION      HYSTERECTOMY      JOINT REPLACEMENT      BILAT KNEES    LUMBAR SPINE SURGERY      stimulator in place    OTHER SURGICAL HISTORY  6/20/11    lap gastric sleeve    OTHER SURGICAL HISTORY Right 09/01/2017    RIGHT PATELLA REVISION      PAIN MANAGEMENT PROCEDURE Bilateral 8/27/2020    BILATERAL SACROILIAC JOINT INJECTION SITE CONFIRMED BY FLUOROSCOPY performed by Baltazar Mancilla MD at [address] [facility] Bilateral 10/22/2020    BILATERAL SACROILIAC JOINT INJECTION SITE CONFIRMED BY FLUOROSCOPY performed by Baltazar Mancilla MD at [facility] [facility] Left 1/21/2021    LEFT SACRAL ONE EPIDURAL STEROID INJECTION SITE CONFIRMED BY FLUOROSCOPY performed by Baltazar Mancilla MD at [address] SOFT TISSUE TUMOR RESECTION  4-16-10    resection of open wound and mass of suprapubic area    TUBAL LIGATION         Social History:    Social History     Tobacco Use    Smoking status: Never Smoker    Smokeless tobacco: Never Used   Substance Use Topics    Alcohol use: Yes     Comment: RARE                                Counseling given: Not Answered      Vital Signs (Current):   Vitals:    01/13/22 1254 01/20/22 1142   BP:  131/88   Pulse:  106   Resp:  16   Temp:  97.3 °F (36.3 °C)   TempSrc:  Temporal   SpO2:  98%   Weight: (!) 314 lb (142.4 kg) (!) 306 lb 7 oz (139 kg)   Height: 5' 8\" (1.727 m) 5' 8\" (1.727 m) Detected 11/26/2021    COVID19 Not Detected 12/11/2020           Anesthesia Evaluation     history of anesthetic complications: PONV. Airway: Mallampati: IV  TM distance: >3 FB     Mouth opening: > = 3 FB Dental:      Comment: Fair dentition; denies loose teeth    Pulmonary:   (+) sleep apnea:  asthma:     (-) rhonchi, wheezes, rales and not a current smoker                          ROS comment: CT Chest:  Abnormal mediastinal adenopathy and scattered subcentimeter noncalcified pulmonary nodules raising the question of underlying metastatic disease. Cardiovascular:  Exercise tolerance: no interval change,   (+) hyperlipidemia    (-) hypertension, orthopnea,  CHINCHILLA, murmur, weak pulses and peripheral edema      Rhythm: regular  Rate: normal                 ROS comment: + PFO, s/p closure device  + Moderate LVH    Echocardiogram:  Summary   Normal left ventricular size. Moderate concentric left ventricular hypertrophy. Normal left ventricular systolic function with an estimated ejection fraction of 65%. There is a 35 mm pfo closure device that appears well seated. A bubble study was performed and fails to show evidence of shunting. Neuro/Psych:   (+) CVA:, TIA, headaches: migraine headaches,    (-) seizures            ROS comment: Chronic back pain with lumbar radiculopathy  s/p spinal cord stimulator GI/Hepatic/Renal:   (+) GERD:, morbid obesity (s/p gastric sleeve)     (-) liver disease and no renal disease       Endo/Other:    (+) blood dyscrasia (DAPT; last Hgb: 11.2): anticoagulation therapy and anemia, arthritis: OA., .    (-) diabetes mellitus, hypothyroidism, hyperthyroidism               Abdominal:   (+) obese (Moribund),     Abdomen: soft. Vascular: Other Findings:           Anesthesia Plan      MAC     ASA 3     (Reports history of PONV and allergy to scopolamine patch. Emend ordered in preop. NPO appropriate; denies active nausea / GERD.)  Induction: intravenous.     MIPS: Postoperative opioids intended and Prophylactic antiemetics administered. Anesthetic plan and risks discussed with patient. Use of blood products discussed with patient whom consented to blood products. Plan discussed with CRNA. This pre-anesthesia assessment may be used as a history and physical.    DOS STAFF ADDENDUM:    Pt seen and examined, chart reviewed (including anesthesia, drug and allergy history). No interval changes to history and physical examination. Anesthetic plan, risks, benefits, alternatives, and personnel involved discussed with patient. Patient verbalized an understanding and agrees to proceed.       Rissa Peck MD  January 20, 2022  11:51 AM

## 2022-01-21 ENCOUNTER — TELEPHONE (OUTPATIENT)
Dept: PULMONOLOGY | Age: 62
End: 2022-01-21

## 2022-01-21 NOTE — TELEPHONE ENCOUNTER
Patient had + rapid COVID test prior to procedure. She said her  tested positive on 1/3 and two sons had COVID as well. She feels fine. [facility] policy is to wait 14 days.   Will have to reschedule her ANICETOUS     William Rincon, DO [facility]  [phone]

## 2022-01-25 RX ORDER — PREDNISONE 50 MG/1
10 TABLET ORAL DAILY
COMMUNITY
End: 2022-04-27

## 2022-01-25 NOTE — PROGRESS NOTES
[address] [facility] time___1400_________        Surgery time_____1530_______    Take the following medications with a sip of water:    Do not eat or drink anything after 12:00 midnight prior to your surgery. This includes water chewing gum, mints and ice chips. You may brush your teeth and gargle the morning of your surgery, but do not swallow the water     Please see your family doctor/pediatrician for a history and physical and/or concerning medications. Bring any test results/reports from your physicians office. If you are under the care of a heart doctor or specialist doctor, please be aware that you may be asked to them for clearance    You may be asked to stop blood thinners such as Coumadin, Plavix, Fragmin, Lovenox, etc., or any anti-inflammatories such as:  Aspirin, Ibuprofen, Advil, Naproxen prior to your surgery. We also ask that you stop any OTC medications such as fish oil, vitamin E, glucosamine, garlic, Multivitamins, COQ 10, etc.    We ask that you do not smoke 24 hours prior to surgery  We ask that you do not  drink any alcoholic beverages 24 hours prior to surgery     You must make arrangements for a responsible adult to take you home after your surgery. For your safety you will not be allowed to leave alone or drive yourself home. Your surgery will be cancelled if you do not have a ride home. Also for your safety, it is strongly suggested that someone stay with you the first 24 hours after your surgery. A parent or legal guardian must accompany a child scheduled for surgery and plan to stay at the hospital until the child is discharged. Please do not bring other children with you. For your comfort, please wear simple loose fitting clothing to the hospital.  Please do not bring valuables.     Do not wear any make-up or nail polish on your fingers or toes      For your safety, please do not wear any jewelry or body piercing's on the day of surgery. All jewelry must be removed. If you have dentures, they will be removed before going to operating room. For your convenience, we will provide you with a container. If you wear contact lenses or glasses, they will be removed, please bring a case for them. If you have a living will and a durable power of  for healthcare, please bring in a copy. As part of our patient safety program to minimize surgical site infections, we ask you to do the following:    · Please notify your surgeon if you develop any illness between         now and the  day of your surgery. · This includes a cough, cold, fever, sore throat, nausea,         or vomiting, and diarrhea, etc.  ·  Please notify your surgeon if you experience dizziness, shortness         of breath or blurred vision between now and the time of your surgery. Do not shave your operative site 96 hours prior to surgery. For face and neck surgery, men may use an electric razor 48 hours   prior to surgery. You may shower the night before surgery or the morning of   your surgery with an antibacterial soap. You will need to bring a photo ID and insurance card    [facility] has an onsite pharmacy, would you like to utilize our pharmacy     If you will be staying overnight and use a C-pap machine, please bring   your C-pap to hospital     Our goal is to provide you with excellent care, therefore, visitors will be limited to two(2) in the room at a time so that we may focus on providing this care for you. Please contact pre-admission testing if you have any further questions. [facility] phone number:  [phone] [facility] fax number:  [phone]  Please note these are generalized instructions for all surgical cases, you may be provided with more specific instructions according to your surgery.

## 2022-01-31 NOTE — TELEPHONE ENCOUNTER
Last ov: 12/13/21  Last labs: 12/20/21  Next appointment:  Last RF:10/29/21 30 tab 5 RF    New pharmacy

## 2022-02-01 RX ORDER — FUROSEMIDE 40 MG/1
40 TABLET ORAL DAILY
Qty: 90 TABLET | Refills: 1 | Status: SHIPPED | OUTPATIENT
Start: 2022-02-01 | End: 2022-08-26

## 2022-02-02 ENCOUNTER — TELEPHONE (OUTPATIENT)
Dept: PULMONOLOGY | Age: 62
End: 2022-02-02

## 2022-02-02 ENCOUNTER — ANESTHESIA EVENT (OUTPATIENT)
Dept: ENDOSCOPY | Age: 62
End: 2022-02-02
Payer: COMMERCIAL

## 2022-02-02 RX ORDER — SODIUM CHLORIDE 9 MG/ML
25 INJECTION, SOLUTION INTRAVENOUS PRN
Status: CANCELLED | OUTPATIENT
Start: 2022-02-02

## 2022-02-02 RX ORDER — SODIUM CHLORIDE 0.9 % (FLUSH) 0.9 %
10 SYRINGE (ML) INJECTION EVERY 12 HOURS SCHEDULED
Status: CANCELLED | OUTPATIENT
Start: 2022-02-02

## 2022-02-02 RX ORDER — SODIUM CHLORIDE 0.9 % (FLUSH) 0.9 %
10 SYRINGE (ML) INJECTION PRN
Status: CANCELLED | OUTPATIENT
Start: 2022-02-02

## 2022-02-02 RX ORDER — SODIUM CHLORIDE 9 MG/ML
INJECTION, SOLUTION INTRAVENOUS CONTINUOUS
Status: CANCELLED | OUTPATIENT
Start: 2022-02-02

## 2022-02-02 NOTE — TELEPHONE ENCOUNTER
Called [facility] and will reschedule Bronch from tomorrow 2/3/ to 2/15/22 @ 12:00 due to weather.  [facility] will arrive at 10:30am.  Please fax original order with current date of 2/3/ crossed out with added new date/time of  2/15/2022 and time to [facility]

## 2022-02-03 ENCOUNTER — ANESTHESIA (OUTPATIENT)
Dept: ENDOSCOPY | Age: 62
End: 2022-02-03
Payer: COMMERCIAL

## 2022-02-04 NOTE — PROGRESS NOTES
Patient had been re-scheduled from 2/3 due to a positive Covid test on 1/20. Remains asymptomatic.      Was instructed arrival time 1030 due to the fact she states \"a hard stick\"

## 2022-02-04 NOTE — PROGRESS NOTES
[address] [facility] time____1030________        Surgery time___1200_________    Take the following medications with a sip of water:    Do not eat or drink anything after 12:00 midnight prior to your surgery. This includes water chewing gum, mints and ice chips. You may brush your teeth and gargle the morning of your surgery, but do not swallow the water     Please see your family doctor/pediatrician for a history and physical and/or concerning medications. Bring any test results/reports from your physicians office. If you are under the care of a heart doctor or specialist doctor, please be aware that you may be asked to them for clearance    You may be asked to stop blood thinners such as Coumadin, Plavix, Fragmin, Lovenox, etc., or any anti-inflammatories such as:  Aspirin, Ibuprofen, Advil, Naproxen prior to your surgery. We also ask that you stop any OTC medications such as fish oil, vitamin E, glucosamine, garlic, Multivitamins, COQ 10, etc.    We ask that you do not smoke 24 hours prior to surgery  We ask that you do not  drink any alcoholic beverages 24 hours prior to surgery     You must make arrangements for a responsible adult to take you home after your surgery. For your safety you will not be allowed to leave alone or drive yourself home. Your surgery will be cancelled if you do not have a ride home. Also for your safety, it is strongly suggested that someone stay with you the first 24 hours after your surgery. A parent or legal guardian must accompany a child scheduled for surgery and plan to stay at the hospital until the child is discharged. Please do not bring other children with you. For your comfort, please wear simple loose fitting clothing to the hospital.  Please do not bring valuables.     Do not wear any make-up or nail polish on your fingers or toes      For your safety, please do not wear any jewelry or body piercing's on the day of surgery. All jewelry must be removed. If you have dentures, they will be removed before going to operating room. For your convenience, we will provide you with a container. If you wear contact lenses or glasses, they will be removed, please bring a case for them. If you have a living will and a durable power of  for healthcare, please bring in a copy. As part of our patient safety program to minimize surgical site infections, we ask you to do the following:    · Please notify your surgeon if you develop any illness between         now and the  day of your surgery. · This includes a cough, cold, fever, sore throat, nausea,         or vomiting, and diarrhea, etc.  ·  Please notify your surgeon if you experience dizziness, shortness         of breath or blurred vision between now and the time of your surgery. Do not shave your operative site 96 hours prior to surgery. For face and neck surgery, men may use an electric razor 48 hours   prior to surgery. You may shower the night before surgery or the morning of   your surgery with an antibacterial soap. You will need to bring a photo ID and insurance card    [facility] has an onsite pharmacy, would you like to utilize our pharmacy     If you will be staying overnight and use a C-pap machine, please bring   your C-pap to hospital     Our goal is to provide you with excellent care, therefore, visitors will be limited to two(2) in the room at a time so that we may focus on providing this care for you. Please contact pre-admission testing if you have any further questions. [facility] phone number:  [phone] [facility] fax number:  [phone]  Please note these are generalized instructions for all surgical cases, you may be provided with more specific instructions according to your surgery.

## 2022-02-15 ENCOUNTER — HOSPITAL ENCOUNTER (OUTPATIENT)
Age: 62
Setting detail: OUTPATIENT SURGERY
Discharge: HOME | End: 2022-02-15
Attending: INTERNAL MEDICINE | Admitting: INTERNAL MEDICINE
Payer: COMMERCIAL

## 2022-02-15 VITALS
HEART RATE: 88 BPM | SYSTOLIC BLOOD PRESSURE: 143 MMHG | RESPIRATION RATE: 14 BRPM | OXYGEN SATURATION: 96 % | TEMPERATURE: 98 F | HEIGHT: 68 IN | BODY MASS INDEX: 44.41 KG/M2 | DIASTOLIC BLOOD PRESSURE: 78 MMHG | WEIGHT: 293 LBS

## 2022-02-15 VITALS
DIASTOLIC BLOOD PRESSURE: 96 MMHG | SYSTOLIC BLOOD PRESSURE: 160 MMHG | OXYGEN SATURATION: 97 % | RESPIRATION RATE: 11 BRPM

## 2022-02-15 PROCEDURE — 3700000000 HC ANESTHESIA ATTENDED CARE: Performed by: INTERNAL MEDICINE

## 2022-02-15 PROCEDURE — 2720000010 HC SURG SUPPLY STERILE: Performed by: INTERNAL MEDICINE

## 2022-02-15 PROCEDURE — 3609020000 HC BRONCHOSCOPY W/EBUS FNA: Performed by: INTERNAL MEDICINE

## 2022-02-15 PROCEDURE — 88173 CYTOPATH EVAL FNA REPORT: CPT

## 2022-02-15 PROCEDURE — 88312 SPECIAL STAINS GROUP 1: CPT

## 2022-02-15 PROCEDURE — 7100000011 HC PHASE II RECOVERY - ADDTL 15 MIN: Performed by: INTERNAL MEDICINE

## 2022-02-15 PROCEDURE — 88177 CYTP FNA EVAL EA ADDL: CPT

## 2022-02-15 PROCEDURE — 3700000001 HC ADD 15 MINUTES (ANESTHESIA): Performed by: INTERNAL MEDICINE

## 2022-02-15 PROCEDURE — 88185 FLOWCYTOMETRY/TC ADD-ON: CPT

## 2022-02-15 PROCEDURE — 6370000000 HC RX 637 (ALT 250 FOR IP): Performed by: INTERNAL MEDICINE

## 2022-02-15 PROCEDURE — 2500000003 HC RX 250 WO HCPCS

## 2022-02-15 PROCEDURE — 2580000003 HC RX 258

## 2022-02-15 PROCEDURE — 88184 FLOWCYTOMETRY/ TC 1 MARKER: CPT

## 2022-02-15 PROCEDURE — C1725 CATH, TRANSLUMIN NON-LASER: HCPCS | Performed by: INTERNAL MEDICINE

## 2022-02-15 PROCEDURE — 7100000010 HC PHASE II RECOVERY - FIRST 15 MIN: Performed by: INTERNAL MEDICINE

## 2022-02-15 PROCEDURE — 31652 BRONCH EBUS SAMPLNG 1/2 NODE: CPT | Performed by: INTERNAL MEDICINE

## 2022-02-15 PROCEDURE — 88305 TISSUE EXAM BY PATHOLOGIST: CPT

## 2022-02-15 PROCEDURE — 7100000001 HC PACU RECOVERY - ADDTL 15 MIN: Performed by: INTERNAL MEDICINE

## 2022-02-15 PROCEDURE — 6360000002 HC RX W HCPCS

## 2022-02-15 PROCEDURE — 7100000000 HC PACU RECOVERY - FIRST 15 MIN: Performed by: INTERNAL MEDICINE

## 2022-02-15 PROCEDURE — 2709999900 HC NON-CHARGEABLE SUPPLY: Performed by: INTERNAL MEDICINE

## 2022-02-15 PROCEDURE — 88172 CYTP DX EVAL FNA 1ST EA SITE: CPT

## 2022-02-15 PROCEDURE — 3609027000 HC BRONCHOSCOPY: Performed by: INTERNAL MEDICINE

## 2022-02-15 RX ORDER — MIDAZOLAM HYDROCHLORIDE 1 MG/ML
INJECTION INTRAMUSCULAR; INTRAVENOUS PRN
Status: DISCONTINUED | OUTPATIENT
Start: 2022-02-15 | End: 2022-02-15 | Stop reason: SDUPTHER

## 2022-02-15 RX ORDER — FENTANYL CITRATE 50 UG/ML
INJECTION, SOLUTION INTRAMUSCULAR; INTRAVENOUS PRN
Status: DISCONTINUED | OUTPATIENT
Start: 2022-02-15 | End: 2022-02-15 | Stop reason: SDUPTHER

## 2022-02-15 RX ORDER — ONDANSETRON 2 MG/ML
INJECTION INTRAMUSCULAR; INTRAVENOUS PRN
Status: DISCONTINUED | OUTPATIENT
Start: 2022-02-15 | End: 2022-02-15 | Stop reason: SDUPTHER

## 2022-02-15 RX ORDER — HYDRALAZINE HYDROCHLORIDE 20 MG/ML
5 INJECTION INTRAMUSCULAR; INTRAVENOUS
Status: DISCONTINUED | OUTPATIENT
Start: 2022-02-15 | End: 2022-02-15 | Stop reason: HOSPADM

## 2022-02-15 RX ORDER — MORPHINE SULFATE 2 MG/ML
2 INJECTION, SOLUTION INTRAMUSCULAR; INTRAVENOUS EVERY 5 MIN PRN
Status: DISCONTINUED | OUTPATIENT
Start: 2022-02-15 | End: 2022-02-15 | Stop reason: HOSPADM

## 2022-02-15 RX ORDER — SODIUM CHLORIDE 9 MG/ML
INJECTION, SOLUTION INTRAVENOUS CONTINUOUS PRN
Status: DISCONTINUED | OUTPATIENT
Start: 2022-02-15 | End: 2022-02-15 | Stop reason: SDUPTHER

## 2022-02-15 RX ORDER — SUCCINYLCHOLINE/SOD CL,ISO/PF 200MG/10ML
SYRINGE (ML) INTRAVENOUS PRN
Status: DISCONTINUED | OUTPATIENT
Start: 2022-02-15 | End: 2022-02-15 | Stop reason: SDUPTHER

## 2022-02-15 RX ORDER — MAGNESIUM CARB/ALUMINUM HYDROX 105-160MG
TABLET,CHEWABLE ORAL PRN
Status: DISCONTINUED | OUTPATIENT
Start: 2022-02-15 | End: 2022-02-15 | Stop reason: ALTCHOICE

## 2022-02-15 RX ORDER — MEPERIDINE HYDROCHLORIDE 25 MG/ML
12.5 INJECTION INTRAMUSCULAR; INTRAVENOUS; SUBCUTANEOUS EVERY 5 MIN PRN
Status: DISCONTINUED | OUTPATIENT
Start: 2022-02-15 | End: 2022-02-15 | Stop reason: HOSPADM

## 2022-02-15 RX ORDER — PROPOFOL 10 MG/ML
INJECTION, EMULSION INTRAVENOUS PRN
Status: DISCONTINUED | OUTPATIENT
Start: 2022-02-15 | End: 2022-02-15 | Stop reason: SDUPTHER

## 2022-02-15 RX ORDER — ROCURONIUM BROMIDE 10 MG/ML
INJECTION, SOLUTION INTRAVENOUS PRN
Status: DISCONTINUED | OUTPATIENT
Start: 2022-02-15 | End: 2022-02-15 | Stop reason: SDUPTHER

## 2022-02-15 RX ORDER — DEXAMETHASONE SODIUM PHOSPHATE 4 MG/ML
INJECTION, SOLUTION INTRA-ARTICULAR; INTRALESIONAL; INTRAMUSCULAR; INTRAVENOUS; SOFT TISSUE PRN
Status: DISCONTINUED | OUTPATIENT
Start: 2022-02-15 | End: 2022-02-15 | Stop reason: SDUPTHER

## 2022-02-15 RX ORDER — ONDANSETRON 2 MG/ML
4 INJECTION INTRAMUSCULAR; INTRAVENOUS
Status: DISCONTINUED | OUTPATIENT
Start: 2022-02-15 | End: 2022-02-15 | Stop reason: HOSPADM

## 2022-02-15 RX ORDER — LABETALOL HYDROCHLORIDE 5 MG/ML
5 INJECTION, SOLUTION INTRAVENOUS EVERY 10 MIN PRN
Status: DISCONTINUED | OUTPATIENT
Start: 2022-02-15 | End: 2022-02-15 | Stop reason: HOSPADM

## 2022-02-15 RX ORDER — LIDOCAINE HYDROCHLORIDE 20 MG/ML
INJECTION, SOLUTION EPIDURAL; INFILTRATION; INTRACAUDAL; PERINEURAL PRN
Status: DISCONTINUED | OUTPATIENT
Start: 2022-02-15 | End: 2022-02-15 | Stop reason: SDUPTHER

## 2022-02-15 RX ORDER — MORPHINE SULFATE 2 MG/ML
1 INJECTION, SOLUTION INTRAMUSCULAR; INTRAVENOUS EVERY 5 MIN PRN
Status: DISCONTINUED | OUTPATIENT
Start: 2022-02-15 | End: 2022-02-15 | Stop reason: HOSPADM

## 2022-02-15 RX ADMIN — PROPOFOL 160 MG: 10 INJECTION, EMULSION INTRAVENOUS at 12:31

## 2022-02-15 RX ADMIN — ONDANSETRON 4 MG: 2 INJECTION INTRAMUSCULAR; INTRAVENOUS at 13:19

## 2022-02-15 RX ADMIN — Medication 140 MG: at 12:32

## 2022-02-15 RX ADMIN — MIDAZOLAM 2 MG: 1 INJECTION INTRAMUSCULAR; INTRAVENOUS at 12:25

## 2022-02-15 RX ADMIN — SUGAMMADEX 200 MG: 100 INJECTION, SOLUTION INTRAVENOUS at 13:19

## 2022-02-15 RX ADMIN — FENTANYL CITRATE 50 MCG: 50 INJECTION INTRAMUSCULAR; INTRAVENOUS at 12:29

## 2022-02-15 RX ADMIN — FENTANYL CITRATE 50 MCG: 50 INJECTION INTRAMUSCULAR; INTRAVENOUS at 12:42

## 2022-02-15 RX ADMIN — ROCURONIUM BROMIDE 30 MG: 10 INJECTION INTRAVENOUS at 12:40

## 2022-02-15 RX ADMIN — ONDANSETRON 4 MG: 2 INJECTION INTRAMUSCULAR; INTRAVENOUS at 12:36

## 2022-02-15 RX ADMIN — SODIUM CHLORIDE: 9 INJECTION, SOLUTION INTRAVENOUS at 12:18

## 2022-02-15 RX ADMIN — LIDOCAINE HYDROCHLORIDE 60 MG: 20 INJECTION, SOLUTION EPIDURAL; INFILTRATION; INTRACAUDAL; PERINEURAL at 12:31

## 2022-02-15 RX ADMIN — FAMOTIDINE 10 MG: 10 INJECTION, SOLUTION INTRAVENOUS at 12:54

## 2022-02-15 RX ADMIN — DEXAMETHASONE SODIUM PHOSPHATE 10 MG: 4 INJECTION, SOLUTION INTRAMUSCULAR; INTRAVENOUS at 12:36

## 2022-02-15 ASSESSMENT — PULMONARY FUNCTION TESTS
PIF_VALUE: 35
PIF_VALUE: 1
PIF_VALUE: 35
PIF_VALUE: 20
PIF_VALUE: 35
PIF_VALUE: 35
PIF_VALUE: 10
PIF_VALUE: 5
PIF_VALUE: 13
PIF_VALUE: 13
PIF_VALUE: 35
PIF_VALUE: 14
PIF_VALUE: 35
PIF_VALUE: 31
PIF_VALUE: 35
PIF_VALUE: 35
PIF_VALUE: 0
PIF_VALUE: 29
PIF_VALUE: 35
PIF_VALUE: 2
PIF_VALUE: 35
PIF_VALUE: 31
PIF_VALUE: 24
PIF_VALUE: 24
PIF_VALUE: 35
PIF_VALUE: 29
PIF_VALUE: 35
PIF_VALUE: 35
PIF_VALUE: 14
PIF_VALUE: 35
PIF_VALUE: 35
PIF_VALUE: 28
PIF_VALUE: 27
PIF_VALUE: 2
PIF_VALUE: 27
PIF_VALUE: 35
PIF_VALUE: 31
PIF_VALUE: 8
PIF_VALUE: 0
PIF_VALUE: 35

## 2022-02-15 ASSESSMENT — LIFESTYLE VARIABLES: SMOKING_STATUS: 0

## 2022-02-15 ASSESSMENT — PAIN SCALES - GENERAL
PAINLEVEL_OUTOF10: 0

## 2022-02-15 ASSESSMENT — PAIN - FUNCTIONAL ASSESSMENT: PAIN_FUNCTIONAL_ASSESSMENT: 0-10

## 2022-02-15 NOTE — ANESTHESIA PRE PROCEDURE
Department of Anesthesiology  Preprocedure Note       Name:  Gabriele Funez   Age:  59 y.o.  :  1960                                          MRN:  [id number]         Date:  2/15/2022      Surgeon: Rena Ramirez):  Nava Padgett DO    Procedure: Procedure(s):  BRONCHOSCOPY WITH ENDOBRONCHIAL ULTRASOUND; POSSIBLE FINE NEEDLE BIOPSY WITH PATHOLOGY    Medications prior to admission:   Prior to Admission medications    Medication Sig Start Date End Date Taking? Authorizing Provider   furosemide (LASIX) 40 MG tablet Take 1 tablet by mouth daily 22   Cindy Nowak MD   predniSONE (DELTASONE) 50 MG tablet Take 60 mg by mouth daily    Historical Provider, MD   atorvastatin (LIPITOR) 10 MG tablet Take 10 mg by mouth nightly    Historical Provider, MD   dextran 70-hypromellose (TEARS PURE) 0.1-0.3 % SOLN opthalmic solution Place 1 drop into both eyes every 4 hours    Historical Provider, MD   VITAMIN D PO Take 1 tablet by mouth nightly     Historical Provider, MD   Ferrous Sulfate (IRON PO) Take 1 tablet by mouth nightly     Historical Provider, MD   clopidogrel (PLAVIX) 75 MG tablet Take 1 tablet by mouth daily  Patient taking differently: Take 75 mg by mouth nightly Pt instructed by myla to stop taking 5 days prior 3/16/21   Cindy Nowak MD   aspirin 81 MG EC tablet Take 1 tablet by mouth daily  Patient taking differently: Take 81 mg by mouth nightly  20   Khadra Littlejohn APRN - AMANDA   sennosides-docusate sodium (SENOKOT-S) 8.6-50 MG tablet Take 1 tablet by mouth nightly     Historical Provider, MD   cyclobenzaprine (FLEXERIL) 10 MG tablet Take 10 mg by mouth nightly     Historical Provider, MD   DULoxetine (CYMBALTA) 60 MG extended release capsule Take 60 mg by mouth nightly  17   Historical Provider, MD   rOPINIRole (REQUIP) 3 MG tablet Take 3 mg by mouth nightly  9/16/15   Historical Provider, MD   montelukast (SINGULAIR) 10 MG tablet Take 10 mg by mouth nightly.     Historical Provider, MD   omeprazole (PRILOSEC) 20 MG capsule Take 20 mg by mouth nightly. 4/15/10   Historical Provider, MD   albuterol (PROAIR HFA) 108 (90 BASE) MCG/ACT inhaler Inhale 2 puffs into the lungs every 6 hours as needed. Historical Provider, MD       Current medications:    No current outpatient medications on file. No current facility-administered medications for this visit. Allergies:     Allergies   Allergen Reactions    Morphine Hives and Nausea And Vomiting    Scopolamine Nausea Only    Adhesive Tape Other (See Comments)     Red skin      Ampicillin Hives, Itching and Nausea Only    Nitrofurantoin     Percocet [Oxycodone-Acetaminophen]     Propoxyphene     Vicodin [Hydrocodone-Acetaminophen]     Codeine Itching and Anxiety     \"Goes nuts\"       Problem List:    Patient Active Problem List   Diagnosis Code    Surgical wound infection T81.49XA    Asthma J45.909    TALI (obstructive sleep apnea) G47.33    OA (osteoarthritis) of knee M17.10    Morbid obesity due to excess calories ([facility]) E66.01    Status post partial gastrectomy Z90.3    Right knee pain M25.561    Low back pain with radiation M54.50    Disc degeneration, lumbar M51.36    Neuropathy of right lower extremity G57.91    Status post total right knee replacement Z96.651    Sacroiliitis, not elsewhere classified ([facility] [address] M46.1    Stroke-like symptoms R29.90    Cerebrovascular accident (CVA) due to embolism of cerebral artery ([facility]) I63.40    PFO (patent foramen ovale) Q21.1    Lumbar radiculopathy M54.16    TIA (transient ischemic attack) G45.9    Vision loss of left eye H54.62    Mediastinal adenopathy R59.0    Pulmonary nodules R91.8       Past Medical History:        Diagnosis Date    Anxiety     Asthma     low O 2 SATS POST-OP    Cancer ([facility])     melanoma    GERD (gastroesophageal reflux disease)     Herniated lumbar intervertebral disc     l4    IBS (irritable bowel syndrome)     Migraine     Nausea & vomiting     OA (osteoarthritis)     PONV (postoperative nausea and vomiting)     Sleep apnea     PT STATES \"PROBABLE\"    UTI (urinary tract infection)     FREQUENT       Past Surgical History:        Procedure Laterality Date    BREAST LUMPECTOMY      CHOLECYSTECTOMY, LAPAROSCOPIC      ENDOMETRIAL ABLATION      HYSTERECTOMY      JOINT REPLACEMENT      BILAT KNEES    LUMBAR SPINE SURGERY      stimulator in place    OTHER SURGICAL HISTORY  6/20/11    lap gastric sleeve    OTHER SURGICAL HISTORY Right 09/01/2017    RIGHT PATELLA REVISION      PAIN MANAGEMENT PROCEDURE Bilateral 8/27/2020    BILATERAL SACROILIAC JOINT INJECTION SITE CONFIRMED BY FLUOROSCOPY performed by Michael Wing MD at [address] Bilateral 10/22/2020    BILATERAL SACROILIAC JOINT INJECTION SITE CONFIRMED BY FLUOROSCOPY performed by Michael Wing MD at [address] Left 1/21/2021    LEFT SACRAL ONE EPIDURAL STEROID INJECTION SITE CONFIRMED BY FLUOROSCOPY performed by Michael Wing MD at [address] RESECTION  4-16-10    resection of open wound and mass of suprapubic area    TUBAL LIGATION         Social History:    Social History     Tobacco Use    Smoking status: Never Smoker    Smokeless tobacco: Never Used   Substance Use Topics    Alcohol use: Yes     Comment: RARE                                Counseling given: Not Answered      Vital Signs (Current): There were no vitals filed for this visit.                                            BP Readings from Last 3 Encounters:   02/15/22 (!) 169/92   01/20/22 131/88   01/12/22 (!) 140/84       NPO Status:                                                                                 BMI:   Wt Readings from Last 3 Encounters:   02/15/22 (!) 310 lb 8 oz (140.8 kg)   01/20/22 (!) 306 lb 7 oz (139 kg)   01/12/22 (!) 314 lb 3.2 oz (142.5 kg)     There is no height or weight on file to calculate BMI.    CBC:   Lab Results   Component Value Date    WBC 4.8 12/21/2021    RBC 4.70 12/21/2021    HGB 11.2 12/21/2021    HCT 35.0 12/21/2021    MCV 74.3 12/21/2021    RDW 18.1 12/21/2021     12/21/2021       CMP:   Lab Results   Component Value Date     12/21/2021    K 4.1 12/21/2021     12/21/2021    CO2 24 12/21/2021    BUN 7 12/21/2021    CREATININE <0.5 12/21/2021    GFRAA >60 12/21/2021    GFRAA 114 06/07/2011    AGRATIO 1.2 12/21/2021    LABGLOM >60 12/21/2021    GLUCOSE 103 12/21/2021    PROT 6.7 12/21/2021    PROT 7.7 02/11/2010    CALCIUM 9.5 12/21/2021    BILITOT 0.3 12/21/2021    ALKPHOS 95 12/21/2021    AST 12 12/21/2021    ALT 14 12/21/2021       POC Tests: No results for input(s): POCGLU, POCNA, POCK, POCCL, POCBUN, POCHEMO, POCHCT in the last 72 hours. Coags:   Lab Results   Component Value Date    PROTIME 12.1 12/21/2021    INR 1.07 12/21/2021    APTT 35.4 12/21/2021       HCG (If Applicable):   Lab Results   Component Value Date    PREGTESTUR Neg 02/17/2010        ABGs: No results found for: PHART, PO2ART, EUR1DJX, YMB3DFF, BEART, Q5AWPACC     Type & Screen (If Applicable):  Lab Results   Component Value Date    LABABO A 06/07/2011    LABRH Negative 06/07/2011       Drug/Infectious Status (If Applicable):  No results found for: HIV, HEPCAB    COVID-19 Screening (If Applicable):   Lab Results   Component Value Date    COVID19 DETECTED 01/20/2022    COVID19 Not Detected 12/11/2020           Anesthesia Evaluation     history of anesthetic complications: PONV. Airway: Mallampati: IV  TM distance: >3 FB     Mouth opening: > = 3 FB Dental:      Comment: Fair dentition; denies loose teeth    Pulmonary:   (+) sleep apnea:  asthma:     (-) not a current smoker                          ROS comment: CT Chest:  Abnormal mediastinal adenopathy and scattered subcentimeter noncalcified pulmonary nodules raising the question of underlying metastatic disease. Cardiovascular:  Exercise tolerance: no interval change,   (+) hyperlipidemia    (-) hypertension, orthopnea and  CHINCHILLA             ROS comment: + PFO, s/p closure device  + Moderate LVH    Echocardiogram:  Summary   Normal left ventricular size. Moderate concentric left ventricular hypertrophy. Normal left ventricular systolic function with an estimated ejection fraction of 65%. There is a 35 mm pfo closure device that appears well seated. A bubble study was performed and fails to show evidence of shunting. Neuro/Psych:   (+) CVA:, TIA, headaches: migraine headaches,    (-) seizures            ROS comment: Chronic back pain with lumbar radiculopathy  s/p spinal cord stimulator GI/Hepatic/Renal:   (+) GERD:, morbid obesity (s/p gastric sleeve)     (-) liver disease and no renal disease       Endo/Other:    (+) blood dyscrasia (DAPT; last Hgb: 11.2): anticoagulation therapy and anemia, arthritis: OA., .    (-) diabetes mellitus, hypothyroidism, hyperthyroidism               Abdominal:         Obesity: Moribund. Vascular: Other Findings:               Anesthesia Plan      general     ASA 3     (I discussed with the patient the risks and benefits of PIV, general anesthesia, IV Narcotics, PACU. All questions were answered the patient agrees with the plan.)  Induction: intravenous. MIPS: Postoperative opioids intended and Prophylactic antiemetics administered. Anesthetic plan and risks discussed with patient. Plan discussed with CRNA. This pre-anesthesia assessment may be used as a history and physical.    DOS STAFF ADDENDUM:    Pt seen and examined, chart reviewed (including anesthesia, drug and allergy history). No interval changes to history and physical examination. Anesthetic plan, risks, benefits, alternatives, and personnel involved discussed with patient. Patient verbalized an understanding and agrees to proceed.       Deidre Saini MD  February 15, 2022  11:12 AM

## 2022-02-15 NOTE — H&P
Pre-procedural H&P      HPI:  Here for elective EBUS due to mediastinal adenopathy. Noted back in 12/21 but had to cancel the previous procedures due to COVID and weather.   No new issues to report        Past Medical History:   Diagnosis Date    Anxiety     Asthma     low O 2 SATS POST-OP    Cancer (HCC)     melanoma    GERD (gastroesophageal reflux disease)     Herniated lumbar intervertebral disc     l4    IBS (irritable bowel syndrome)     Migraine     Nausea & vomiting     OA (osteoarthritis)     PONV (postoperative nausea and vomiting)     Sleep apnea     PT STATES \"PROBABLE\"    UTI (urinary tract infection)     FREQUENT       Past Surgical History:   Procedure Laterality Date    BREAST LUMPECTOMY      CHOLECYSTECTOMY, LAPAROSCOPIC      ENDOMETRIAL ABLATION      HYSTERECTOMY      JOINT REPLACEMENT      BILAT KNEES    LUMBAR SPINE SURGERY      stimulator in place    OTHER SURGICAL HISTORY  6/20/11    lap gastric sleeve    OTHER SURGICAL HISTORY Right 09/01/2017    RIGHT PATELLA REVISION      PAIN MANAGEMENT PROCEDURE Bilateral 8/27/2020    BILATERAL SACROILIAC JOINT INJECTION SITE CONFIRMED BY FLUOROSCOPY performed by Warren Gamez MD at [address] [facility] Bilateral 10/22/2020    BILATERAL SACROILIAC JOINT INJECTION SITE CONFIRMED BY FLUOROSCOPY performed by Warren Gamez MD at [facility] [facility] Left 1/21/2021    LEFT SACRAL ONE EPIDURAL STEROID INJECTION SITE CONFIRMED BY FLUOROSCOPY performed by Warren Gamez MD at [address] SOFT TISSUE TUMOR RESECTION  4-16-10    resection of open wound and mass of suprapubic area    TUBAL LIGATION         Allergies   Allergen Reactions    Morphine Hives and Nausea And Vomiting    Scopolamine Nausea Only    Adhesive Tape Other (See Comments)     Red skin      Ampicillin Hives, Itching and Nausea Only    Nitrofurantoin     Percocet [Oxycodone-Acetaminophen]     Propoxyphene     Vicodin [Hydrocodone-Acetaminophen]     Codeine Itching and Anxiety     \"Goes nuts\"       Current Facility-Administered Medications   Medication Dose Route Frequency Provider Last Rate Last Admin    meperidine (DEMEROL) injection 12.5 mg  12.5 mg IntraVENous Q5 Min PRN Suze Escobedo MD        HYDROmorphone (DILAUDID) injection 0.25 mg  0.25 mg IntraVENous Q5 Min PRN Suze Escobedo MD        HYDROmorphone (DILAUDID) injection 0.5 mg  0.5 mg IntraVENous Q5 Min PRN Suze Escobedo MD        morphine (PF) injection 1 mg  1 mg IntraVENous Q5 Min PRN Suze Escobedo MD        morphine (PF) injection 2 mg  2 mg IntraVENous Q5 Min PRN Suze Escobedo MD        [facility]ansetron [facility]) injection 4 mg  4 mg IntraVENous Once PRN Suze Escobedo MD        labetalol (NORMODYNE;TRANDATE) injection 5 mg  5 mg IntraVENous Q10 Min PRN Suze Escobedo MD        hydrALAZINE (APRESOLINE) injection 5 mg  5 mg IntraVENous Q15 Min PRN Suze Escobedo MD             PE:  GENERAL:  Well nourished, alert, appears stated age, no distress  HEENT:  No scleral icterus, no conjunctival irritation  NECK:  No thyromegaly, no bruits  LYMPH:  No cervical or supraclavicular adenopathy  HEART:  Regular rate and rhythm, no murmurs  LUNGS:  Clear bilaterally   ABDOMEN:  No distention, no organomegaly  EXTREMITIES:  No edema, no digital clubbing  NEURO:  No localizing deficits, CN II-XII intact    Assessment/Plan:  1. Mediastinal Adenopathy, ASA 2   -Airway exam, EBUS    Alexander Lora DO  [facility] Pulmonary, Sleep and Critical Care  [phone]        I have presented reasonable alternatives to the patient's proposed care, treatment, and services.  The discussion I have done encompassed risks, benefits, and side effects related to the alternatives and the risks related to not receiving the proposed care, treatment, and services    We discussed complications including: medication reaction, infection, bleeding, collapsed lung

## 2022-02-15 NOTE — PROGRESS NOTES
Endoscopic Procedure Performed:    ENDOSCOPIC BRONCHIAL ULTRASOUND WITH: Fine Needle Aspiration of    Lymph Node Station(s):     []  Not Applicable  [] 1 (Supraclavicular zone nodes)  [] 2R (Right Upper Paratracheal)   [] 2L (Left Upper Paratracheal)  [] 3 (Prevascular and Prevertabral nodes)  [] 4R (Right Lower Paratracheal)     [] 4L (Left Lower Paratracheal)   [] 5 (Subaortic)   [] 6 (Para-aortic nodes)  [x]  7 (Subcarinal) -5 passes  []  8 (Paraesophageal)  []  9 (Pulmonary ligament)   []  10 (Hilar)    [x]  11 L (Interlobar) -4 passes              []  11 R (Interlobar)       All FNA specimens managed by cytotechnician. Balloon torn post procedure. All pieces accounted for.  Verified by Pal Hussein and Milton Kc RN

## 2022-02-15 NOTE — PROCEDURES
ENDOBRONCHIAL ULTRASOUND with TRANS-BRONCHIAL NEEDLE ASPIRATION     Indications:  Mediastinal Adenopathy  Consent:  Signed on chart  Pre-op diagnosis:  Possible sarcoidosis vs lymphoma vs other     Sedation:  Per Anesthesia      ASA Class 2 - A patient with mild systemic disease      Mallampati:  IV    A Time Out was performed to identify the correct patient and the correct procedure. Narrative:  Anesthesia was provided by Department of Anesthesiology. Please see their notes regarding dose and type of sedation. I entered the ETT without difficulty. Airway exam is as follows: The right upper lobe demonstrated normal anatomy     The middle lobe demonstrated normal anatomy     The right lower lobe demonstrated normal anatomy    The left upper lobe demonstrated normal anatomy     The left lower lobe demonstrated normal anatomy    I passed the ultrasound probe within a flexible sheath, with a balloon tip, down to the target area and viewed the inside of the trachea/bronchus    Station 11L .  4 passes were performed through left main airway  Station 7 .  5 passes were performed through proximal right mainstem airway    I passed the needle through the bronchial wall to obtain the above biopsies. Bedside Pathology impression:  Blood, lymphocytes    Estimated blood loss:  5 ml  Specimens:  9 TBNA of the lymph nodes  Post-op diagnosis:  Pending   Complications: None    DO Fernando Put[facility] Pulmonology, Sleep and Critical Care. I have presented reasonable alternatives to the patient's proposed care, treatment, and services.  The discussion I have done encompassed risks, benefits, and side effects related to the alternatives and the risks related to not receiving the proposed care, treatment, and services

## 2022-02-15 NOTE — ANESTHESIA POSTPROCEDURE EVALUATION
Department of Anesthesiology  Postprocedure Note    Patient: William Gamboa  MRN: [id number]  YOB: 1960  Date of evaluation: 2/15/2022  Time:  5:07 PM     Procedure Summary     Date: 02/15/22 Room / Location: [address] [place] [address]    Anesthesia Start: 7165 Anesthesia Stop: 4914    Procedures:       BRONCHOSCOPY W/EBUS FNA (N/A )      BRONCHOSCOPY DIAGNOSTIC OR CELL [address] ONLY Diagnosis:       Mediastinal adenopathy      (MEDIASTINAL ADENOPATHY)    Surgeons: gA Mims DO Responsible Provider: Vipin Gardner MD    Anesthesia Type: general ASA Status: 3          Anesthesia Type: general    Maya Phase I: Maya Score: 10    Maya Phase II: Maya Score: 10    Last vitals: Reviewed and per EMR flowsheets. Anesthesia Post Evaluation    Patient location during evaluation: PACU  Patient participation: complete - patient participated  Level of consciousness: awake and alert  Airway patency: patent  Nausea & Vomiting: no nausea and no vomiting  Complications: no  Cardiovascular status: hemodynamically stable and blood pressure returned to baseline  Respiratory status: spontaneous ventilation and nonlabored ventilation  Hydration status: stable  Comments: Ms. Sole Bush resting comfortably following procedure. Tolerating clears. Appropriate for discharge home from an anesthesia perspective.        Vipin Gardner MD

## 2022-02-28 ENCOUNTER — OFFICE VISIT (OUTPATIENT)
Dept: PULMONOLOGY | Age: 62
End: 2022-02-28
Payer: COMMERCIAL

## 2022-02-28 VITALS
TEMPERATURE: 97.5 F | RESPIRATION RATE: 12 BRPM | WEIGHT: 293 LBS | OXYGEN SATURATION: 99 % | HEIGHT: 68 IN | DIASTOLIC BLOOD PRESSURE: 80 MMHG | HEART RATE: 103 BPM | BODY MASS INDEX: 44.41 KG/M2 | SYSTOLIC BLOOD PRESSURE: 140 MMHG

## 2022-02-28 DIAGNOSIS — D86.9 SARCOIDOSIS: Primary | ICD-10-CM

## 2022-02-28 DIAGNOSIS — G47.33 OSA ON CPAP: ICD-10-CM

## 2022-02-28 DIAGNOSIS — R91.8 PULMONARY NODULES: ICD-10-CM

## 2022-02-28 DIAGNOSIS — Z99.89 OSA ON CPAP: ICD-10-CM

## 2022-02-28 PROCEDURE — 99214 OFFICE O/P EST MOD 30 MIN: CPT | Performed by: INTERNAL MEDICINE

## 2022-02-28 NOTE — PROGRESS NOTES
Chief complaint  This is a 59y.o. year old female  who comes to see me with a chief complaint of   Chief Complaint   Patient presents with    Follow-up     EBUS       HPI  Here with a cc of EBUS follow up    Was found to have sarcoidosis on EBUS pathology. No respiratory symptoms, only eye issue preceding her diagnosis. Had been on prednisone at the time of the biopsy. She continues to have no real respiratory symptoms. Chronically admits to wheezing when laughing and was told she may have had asthma in the past.  She has not noticed this of late. Having more heart burn symptoms and left ankle foot swelling. She is not sure if related to sarcoidosis or not. Prednisone has not helped with the foot. She has been on prednisone for her eye. She did bring in her CPAP machine as she was set up through a different office    Machine:  Patient is using an APAP machine. Average usage is 7 hrs 13 min 00 sec. She is meeting 4 or more hours per night 90% of the time. Average AHI is 0.6. Patient admits to good compliance and feels rested for the most part .          Jaxon Mcconnell MD  [facility]          Current Outpatient Medications:     furosemide (LASIX) 40 MG tablet, Take 1 tablet by mouth daily, Disp: 90 tablet, Rfl: 1    predniSONE (DELTASONE) 50 MG tablet, Take 60 mg by mouth daily, Disp: , Rfl:     atorvastatin (LIPITOR) 10 MG tablet, Take 10 mg by mouth nightly, Disp: , Rfl:     dextran 70-hypromellose (TEARS PURE) 0.1-0.3 % SOLN opthalmic solution, Place 1 drop into both eyes every 4 hours, Disp: , Rfl:     VITAMIN D PO, Take 1 tablet by mouth nightly , Disp: , Rfl:     Ferrous Sulfate (IRON PO), Take 1 tablet by mouth nightly , Disp: , Rfl:     clopidogrel (PLAVIX) 75 MG tablet, Take 1 tablet by mouth daily (Patient taking differently: Take 75 mg by mouth nightly Pt instructed by myla to stop taking 5 days prior), Disp: 90 tablet, Rfl: 3    aspirin 81 MG EC tablet, Take 1 tablet by mouth daily (Patient taking differently: Take 81 mg by mouth nightly ), Disp: 30 tablet, Rfl: 3    sennosides-docusate sodium (SENOKOT-S) 8.6-50 MG tablet, Take 1 tablet by mouth nightly , Disp: , Rfl:     cyclobenzaprine (FLEXERIL) 10 MG tablet, Take 10 mg by mouth nightly , Disp: , Rfl:     DULoxetine (CYMBALTA) 60 MG extended release capsule, Take 60 mg by mouth nightly , Disp: , Rfl:     rOPINIRole (REQUIP) 3 MG tablet, Take 3 mg by mouth nightly , Disp: , Rfl:     montelukast (SINGULAIR) 10 MG tablet, Take 10 mg by mouth nightly., Disp: , Rfl:     omeprazole (PRILOSEC) 20 MG capsule, Take 20 mg by mouth nightly., Disp: , Rfl:     albuterol (PROAIR HFA) 108 (90 BASE) MCG/ACT inhaler, Inhale 2 puffs into the lungs every 6 hours as needed. , Disp: , Rfl:       PHYSICAL EXAM:  Vitals:    02/28/22 1344   BP: (!) 140/80   Pulse: 103   Resp: 12   Temp: 97.5 °F (36.4 °C)   SpO2: 99%       GENERAL:  Well nourished, alert, appears stated age, no distress  HEENT:  No scleral icterus, no conjunctival irritation  NECK:  No thyromegaly, no bruits  LYMPH:  No cervical or supraclavicular adenopathy  HEART:  Regular rate and rhythm, no murmurs  LUNGS:  Clear bilaterally   ABDOMEN:  No distention, no organomegaly  EXTREMITIES:  + edema, no digital clubbing  NEURO:  No localizing deficits, CN II-XII intact    Pulmonary Function Testing  None    Chest imaging from 12/21 is reviewed. My interpretation is mediastinal and hilar adenopathy with associated scattered nodules. ECHO 12/21  Normal left ventricular size. Moderate concentric left ventricular   hypertrophy. Normal left ventricular systolic function with an estimated ejection   fraction of 65%. There is a 35 mm pfo closure device that appears well seated. A bubble study was performed and fails to show evidence of shunting. I reviewed above labs and studies pertinent to this visit and date    Assessment/Plan:  1. Sarcoidosis  Based on EBUS results.   She will need PFT to assess lung function etc.  Has no respiratory symptoms so treatment is not necessarily indicated. She is on prednisone for visual changes that occurred and led to an admission. Will need COVID test prior to PFTs. - Full PFT Study With Bronchodilator; Future  - COVID-19; Future    2. Pulmonary nodules  Small and bilateral.  LIkely related to Sarcoid. Will need follow up CT in 6 months    3. TALI on CPAP  Benefiting and compliant. Benefiting from therapy by a low [place] score, good energy levels, low fatigue, and control of chronic medical problems      Follow up in 2 months (After PFTs) and to order repeat CT scan to monitor nodes and nodules.

## 2022-03-02 ENCOUNTER — TELEPHONE (OUTPATIENT)
Dept: CARDIOLOGY CLINIC | Age: 62
End: 2022-03-02

## 2022-03-02 NOTE — TELEPHONE ENCOUNTER
CARDIAC CLEARANCE     Procedure: Colonoscopy  : Dr. Adalberto Vasques  Date: TBD    Medications needing held: Anticoagualnt/antiplatelet therapy      How long?: 7 days prior    Phone Number and Contact Name for Physicians office: [phone] Attn: Catarina Ding  Fax number to send information: [phone]    Clinical staff:    Cardiologist:  Last Appointment:  Next Appointment:  Cardiac History:     Original request scanned into patient's chart.

## 2022-03-04 NOTE — TELEPHONE ENCOUNTER
Please see Dr. James Numbers recommendation he may proceed with the colonoscopy and may hold ASA and Plavix as directed. Please notify and prepare a letter if necessary.

## 2022-03-08 ENCOUNTER — HOSPITAL ENCOUNTER (OUTPATIENT)
Dept: PULMONOLOGY | Age: 62
Discharge: HOME | End: 2022-03-08
Payer: COMMERCIAL

## 2022-03-08 DIAGNOSIS — D86.9 SARCOIDOSIS: ICD-10-CM

## 2022-03-08 LAB
DLCO %PRED: 64 %
DLCO PRED: NORMAL
DLCO/VA %PRED: NORMAL
DLCO/VA PRED: NORMAL
DLCO/VA: NORMAL
DLCO: NORMAL
EXPIRATORY TIME-POST: NORMAL
EXPIRATORY TIME: NORMAL
FEF 25-75% %CHNG: NORMAL
FEF 25-75% %PRED-POST: NORMAL
FEF 25-75% %PRED-PRE: NORMAL
FEF 25-75% PRED: NORMAL
FEF 25-75%-POST: NORMAL
FEF 25-75%-PRE: NORMAL
FEV1 %PRED-POST: 89 %
FEV1 %PRED-PRE: 85 %
FEV1 PRED: NORMAL
FEV1-POST: NORMAL
FEV1-PRE: NORMAL
FEV1/FVC %PRED-POST: NORMAL
FEV1/FVC %PRED-PRE: NORMAL
FEV1/FVC PRED: NORMAL
FEV1/FVC-POST: 102 %
FEV1/FVC-PRE: 93 %
FVC %PRED-POST: NORMAL
FVC %PRED-PRE: NORMAL
FVC PRED: NORMAL
FVC-POST: NORMAL
FVC-PRE: NORMAL
GAW %PRED: NORMAL
GAW PRED: NORMAL
GAW: NORMAL
IC %PRED: NORMAL
IC PRED: NORMAL
IC: NORMAL
MEP: NORMAL
MIP: NORMAL
MVV %PRED-PRE: NORMAL
MVV PRED: NORMAL
MVV-PRE: NORMAL
PEF %PRED-POST: NORMAL
PEF %PRED-PRE: NORMAL
PEF PRED: NORMAL
PEF%CHNG: NORMAL
PEF-POST: NORMAL
PEF-PRE: NORMAL
RAW %PRED: NORMAL
RAW PRED: NORMAL
RAW: NORMAL
RV %PRED: NORMAL
RV PRED: NORMAL
RV: NORMAL
SVC %PRED: NORMAL
SVC PRED: NORMAL
SVC: NORMAL
TLC %PRED: 81 %
TLC PRED: NORMAL
TLC: NORMAL
VA %PRED: NORMAL
VA PRED: NORMAL
VA: NORMAL
VTG %PRED: NORMAL
VTG PRED: NORMAL
VTG: NORMAL

## 2022-03-08 PROCEDURE — 6370000000 HC RX 637 (ALT 250 FOR IP): Performed by: INTERNAL MEDICINE

## 2022-03-08 PROCEDURE — 94726 PLETHYSMOGRAPHY LUNG VOLUMES: CPT | Performed by: INTERNAL MEDICINE

## 2022-03-08 PROCEDURE — 94060 EVALUATION OF WHEEZING: CPT

## 2022-03-08 PROCEDURE — 94729 DIFFUSING CAPACITY: CPT | Performed by: INTERNAL MEDICINE

## 2022-03-08 PROCEDURE — 94060 EVALUATION OF WHEEZING: CPT | Performed by: INTERNAL MEDICINE

## 2022-03-08 PROCEDURE — 94726 PLETHYSMOGRAPHY LUNG VOLUMES: CPT

## 2022-03-08 PROCEDURE — 94640 AIRWAY INHALATION TREATMENT: CPT

## 2022-03-08 PROCEDURE — 94729 DIFFUSING CAPACITY: CPT

## 2022-03-08 RX ORDER — ALBUTEROL SULFATE 90 UG/1
4 AEROSOL, METERED RESPIRATORY (INHALATION) ONCE
Status: COMPLETED | OUTPATIENT
Start: 2022-03-08 | End: 2022-03-08

## 2022-03-08 RX ADMIN — ALBUTEROL SULFATE 4 PUFF: 90 AEROSOL, METERED RESPIRATORY (INHALATION) at 07:42

## 2022-03-08 ASSESSMENT — PULMONARY FUNCTION TESTS
FEV1/FVC_PRE: 93
FEV1/FVC_POST: 102
FEV1_PERCENT_PREDICTED_POST: 89
FEV1_PERCENT_PREDICTED_PRE: 85

## 2022-03-08 NOTE — PROCEDURES
Spirometry was acceptable and reproducible by ATS standards    Spirometry  1. Spirometry is normal.    Bronchodilator  1. There is no response to bronchodilator demonstrated. [Increase in FEV1 and/or FVC => 12% of control and => 200 ml]    Lung Volumes  2. Lung volumes are normal.    Diffusing Capacity  3. Diffusing capacity is mild decreased. [40-60%]        Overall Interpretation  Normal spirometry and lung volumes with FEV1 of 85%. No bronchodilator response. Diffusion yesterday reduced to 64%    Isolated decrease in diffusion capacity can be see with anemia, pulmonary vascular occlusive disease, interstitial lung disease, emphysema and pulmonary edema. Clinical correlation needed.      Pulmonary Function Testing Results:    FEV1 %Pred-Pre   Date Value Ref Range Status   03/08/2022 85 % Final     FEV1 %Pred-Post   Date Value Ref Range Status   03/08/2022 89 % Final     FEV1/FVC-Pre   Date Value Ref Range Status   03/08/2022 93 % Final     FEV1/FVC-Post   Date Value Ref Range Status   03/08/2022 102 % Final     TLC %Pred   Date Value Ref Range Status   03/08/2022 81 % Final     DLCO %Pred   Date Value Ref Range Status   03/08/2022 64 % Final             OBSTRUCTION % Predicted FEV1   MILD >70%   MODERATE 60-69%   MODERATELY-SEVERE 50-59%   SEVERE 35-49%   VERY SEVERE <35%         RESTRICTION % Predicted TLC   MILD Less than LLN but > than 70%   MODERATE 60-69%   MODERATELY-SEVERE <60%                 DIFFUSION CAPACITY DL,CO % Pred   MILD >60% AND < LLN   MODERATE 40-60%   SEVERE <40%       PFT data will be scanned into the procedure tab in epic under this encounter    Kaleigh Porter MD  [facility]

## 2022-04-05 NOTE — PROGRESS NOTES

## 2022-04-12 ENCOUNTER — HOSPITAL ENCOUNTER (OUTPATIENT)
Age: 62
Setting detail: OUTPATIENT SURGERY
Discharge: HOME | End: 2022-04-12
Attending: INTERNAL MEDICINE | Admitting: INTERNAL MEDICINE
Payer: COMMERCIAL

## 2022-04-12 ENCOUNTER — ANESTHESIA (OUTPATIENT)
Dept: ENDOSCOPY | Age: 62
End: 2022-04-12
Payer: COMMERCIAL

## 2022-04-12 ENCOUNTER — ANESTHESIA EVENT (OUTPATIENT)
Dept: ENDOSCOPY | Age: 62
End: 2022-04-12
Payer: COMMERCIAL

## 2022-04-12 VITALS
HEIGHT: 68 IN | DIASTOLIC BLOOD PRESSURE: 88 MMHG | WEIGHT: 293 LBS | OXYGEN SATURATION: 94 % | SYSTOLIC BLOOD PRESSURE: 135 MMHG | RESPIRATION RATE: 15 BRPM | BODY MASS INDEX: 44.41 KG/M2 | HEART RATE: 87 BPM | TEMPERATURE: 96.9 F

## 2022-04-12 VITALS — OXYGEN SATURATION: 92 % | SYSTOLIC BLOOD PRESSURE: 165 MMHG | DIASTOLIC BLOOD PRESSURE: 110 MMHG

## 2022-04-12 DIAGNOSIS — Z86.010 HISTORY OF COLONIC POLYPS: ICD-10-CM

## 2022-04-12 PROCEDURE — 3609010600 HC COLONOSCOPY POLYPECTOMY SNARE/COLD BIOPSY: Performed by: INTERNAL MEDICINE

## 2022-04-12 PROCEDURE — 2500000003 HC RX 250 WO HCPCS: Performed by: NURSE ANESTHETIST, CERTIFIED REGISTERED

## 2022-04-12 PROCEDURE — 7100000010 HC PHASE II RECOVERY - FIRST 15 MIN: Performed by: INTERNAL MEDICINE

## 2022-04-12 PROCEDURE — 6360000002 HC RX W HCPCS: Performed by: NURSE ANESTHETIST, CERTIFIED REGISTERED

## 2022-04-12 PROCEDURE — 3700000000 HC ANESTHESIA ATTENDED CARE: Performed by: INTERNAL MEDICINE

## 2022-04-12 PROCEDURE — 2709999900 HC NON-CHARGEABLE SUPPLY: Performed by: INTERNAL MEDICINE

## 2022-04-12 PROCEDURE — 2580000003 HC RX 258: Performed by: INTERNAL MEDICINE

## 2022-04-12 PROCEDURE — 7100000011 HC PHASE II RECOVERY - ADDTL 15 MIN: Performed by: INTERNAL MEDICINE

## 2022-04-12 PROCEDURE — 3700000001 HC ADD 15 MINUTES (ANESTHESIA): Performed by: INTERNAL MEDICINE

## 2022-04-12 PROCEDURE — 88305 TISSUE EXAM BY PATHOLOGIST: CPT

## 2022-04-12 PROCEDURE — 2580000003 HC RX 258: Performed by: NURSE ANESTHETIST, CERTIFIED REGISTERED

## 2022-04-12 RX ORDER — FERROUS SULFATE 325(65) MG
325 TABLET ORAL
COMMUNITY

## 2022-04-12 RX ORDER — DEXAMETHASONE SODIUM PHOSPHATE 10 MG/ML
INJECTION INTRAMUSCULAR; INTRAVENOUS PRN
Status: DISCONTINUED | OUTPATIENT
Start: 2022-04-12 | End: 2022-04-12 | Stop reason: SDUPTHER

## 2022-04-12 RX ORDER — SODIUM CHLORIDE, SODIUM LACTATE, POTASSIUM CHLORIDE, CALCIUM CHLORIDE 600; 310; 30; 20 MG/100ML; MG/100ML; MG/100ML; MG/100ML
INJECTION, SOLUTION INTRAVENOUS ONCE
Status: COMPLETED | OUTPATIENT
Start: 2022-04-12 | End: 2022-04-12

## 2022-04-12 RX ORDER — SODIUM CHLORIDE, SODIUM LACTATE, POTASSIUM CHLORIDE, CALCIUM CHLORIDE 600; 310; 30; 20 MG/100ML; MG/100ML; MG/100ML; MG/100ML
INJECTION, SOLUTION INTRAVENOUS CONTINUOUS PRN
Status: DISCONTINUED | OUTPATIENT
Start: 2022-04-12 | End: 2022-04-12 | Stop reason: SDUPTHER

## 2022-04-12 RX ORDER — MIDAZOLAM HYDROCHLORIDE 1 MG/ML
INJECTION INTRAMUSCULAR; INTRAVENOUS PRN
Status: DISCONTINUED | OUTPATIENT
Start: 2022-04-12 | End: 2022-04-12 | Stop reason: SDUPTHER

## 2022-04-12 RX ORDER — PROPOFOL 10 MG/ML
INJECTION, EMULSION INTRAVENOUS PRN
Status: DISCONTINUED | OUTPATIENT
Start: 2022-04-12 | End: 2022-04-12 | Stop reason: SDUPTHER

## 2022-04-12 RX ORDER — PLECANATIDE 3 MG/1
3 TABLET ORAL DAILY
Qty: 90 TABLET | Refills: 5 | Status: SHIPPED | OUTPATIENT
Start: 2022-04-12

## 2022-04-12 RX ORDER — KETAMINE HYDROCHLORIDE 100 MG/ML
INJECTION, SOLUTION INTRAMUSCULAR; INTRAVENOUS PRN
Status: DISCONTINUED | OUTPATIENT
Start: 2022-04-12 | End: 2022-04-12 | Stop reason: SDUPTHER

## 2022-04-12 RX ORDER — ONDANSETRON 2 MG/ML
INJECTION INTRAMUSCULAR; INTRAVENOUS PRN
Status: DISCONTINUED | OUTPATIENT
Start: 2022-04-12 | End: 2022-04-12 | Stop reason: SDUPTHER

## 2022-04-12 RX ORDER — LIDOCAINE HYDROCHLORIDE 10 MG/ML
0.1 INJECTION, SOLUTION EPIDURAL; INFILTRATION; INTRACAUDAL; PERINEURAL
Status: DISCONTINUED | OUTPATIENT
Start: 2022-04-12 | End: 2022-04-12 | Stop reason: HOSPADM

## 2022-04-12 RX ADMIN — MIDAZOLAM HYDROCHLORIDE 2 MG: 2 INJECTION, SOLUTION INTRAMUSCULAR; INTRAVENOUS at 08:15

## 2022-04-12 RX ADMIN — PROPOFOL 50 MG: 10 INJECTION, EMULSION INTRAVENOUS at 08:36

## 2022-04-12 RX ADMIN — PROPOFOL 40 MG: 10 INJECTION, EMULSION INTRAVENOUS at 08:29

## 2022-04-12 RX ADMIN — SODIUM CHLORIDE, SODIUM LACTATE, POTASSIUM CHLORIDE, AND CALCIUM CHLORIDE: .6; .31; .03; .02 INJECTION, SOLUTION INTRAVENOUS at 08:13

## 2022-04-12 RX ADMIN — PROPOFOL 50 MG: 10 INJECTION, EMULSION INTRAVENOUS at 08:25

## 2022-04-12 RX ADMIN — KETAMINE HYDROCHLORIDE 40 MG: 100 INJECTION INTRAMUSCULAR; INTRAVENOUS at 08:21

## 2022-04-12 RX ADMIN — SODIUM CHLORIDE, POTASSIUM CHLORIDE, SODIUM LACTATE AND CALCIUM CHLORIDE: 600; 310; 30; 20 INJECTION, SOLUTION INTRAVENOUS at 07:53

## 2022-04-12 RX ADMIN — KETAMINE HYDROCHLORIDE 10 MG: 100 INJECTION INTRAMUSCULAR; INTRAVENOUS at 08:28

## 2022-04-12 RX ADMIN — PROPOFOL 40 MG: 10 INJECTION, EMULSION INTRAVENOUS at 08:32

## 2022-04-12 RX ADMIN — ONDANSETRON 4 MG: 2 INJECTION INTRAMUSCULAR; INTRAVENOUS at 08:28

## 2022-04-12 RX ADMIN — PROPOFOL 40 MG: 10 INJECTION, EMULSION INTRAVENOUS at 08:23

## 2022-04-12 RX ADMIN — PROPOFOL 50 MG: 10 INJECTION, EMULSION INTRAVENOUS at 08:34

## 2022-04-12 RX ADMIN — PROPOFOL 80 MG: 10 INJECTION, EMULSION INTRAVENOUS at 08:21

## 2022-04-12 RX ADMIN — PROPOFOL 80 MG: 10 INJECTION, EMULSION INTRAVENOUS at 08:38

## 2022-04-12 RX ADMIN — DEXAMETHASONE SODIUM PHOSPHATE 10 MG: 10 INJECTION INTRAMUSCULAR; INTRAVENOUS at 08:21

## 2022-04-12 ASSESSMENT — PAIN SCALES - GENERAL
PAINLEVEL_OUTOF10: 0

## 2022-04-12 ASSESSMENT — PAIN - FUNCTIONAL ASSESSMENT: PAIN_FUNCTIONAL_ASSESSMENT: 0-10

## 2022-04-12 NOTE — ANESTHESIA PRE PROCEDURE
Department of Anesthesiology  Preprocedure Note       Name:  Bethany Pay   Age:  59 y.o.  :  1960                                          MRN:  [id number]         Date:  2022      Surgeon: Cade Torres):  Mitchell Frias MD    Procedure: Procedure(s):  COLONOSCOPY -SLEEP APNEA    Medications prior to admission:   Prior to Admission medications    Medication Sig Start Date End Date Taking? Authorizing Provider   bisacodyl (DULCOLAX) 5 MG EC tablet Take 5 mg by mouth daily   Yes Historical Provider, MD   furosemide (LASIX) 40 MG tablet Take 1 tablet by mouth daily 22   Bacilio Linares MD   predniSONE (DELTASONE) 50 MG tablet Take 40 mg by mouth daily     Historical Provider, MD   atorvastatin (LIPITOR) 10 MG tablet Take 10 mg by mouth nightly    Historical Provider, MD   VITAMIN D PO Take 1 tablet by mouth nightly     Historical Provider, MD   Ferrous Sulfate (IRON PO) Take 1 tablet by mouth nightly     Historical Provider, MD   clopidogrel (PLAVIX) 75 MG tablet Take 1 tablet by mouth daily  Patient taking differently: Take 75 mg by mouth nightly  3/16/21   Bacilio Linares MD   aspirin 81 MG EC tablet Take 1 tablet by mouth daily  Patient taking differently: Take 81 mg by mouth nightly  20   KEISHA Delvalle - CNP   cyclobenzaprine (FLEXERIL) 10 MG tablet Take 10 mg by mouth nightly     Historical Provider, MD   DULoxetine (CYMBALTA) 60 MG extended release capsule Take 60 mg by mouth nightly  17   Historical Provider, MD   rOPINIRole (REQUIP) 3 MG tablet Take 3 mg by mouth nightly  9/16/15   Historical Provider, MD   montelukast (SINGULAIR) 10 MG tablet Take 10 mg by mouth nightly. Historical Provider, MD   omeprazole (PRILOSEC) 20 MG capsule Take 20 mg by mouth nightly. 4/15/10   Historical Provider, MD   albuterol (PROAIR HFA) 108 (90 BASE) MCG/ACT inhaler Inhale 2 puffs into the lungs every 6 hours as needed.     Historical Provider, MD       Current medications:    No current facility-administered medications for this encounter. Current Outpatient Medications   Medication Sig Dispense Refill    bisacodyl (DULCOLAX) 5 MG EC tablet Take 5 mg by mouth daily      furosemide (LASIX) 40 MG tablet Take 1 tablet by mouth daily 90 tablet 1    predniSONE (DELTASONE) 50 MG tablet Take 40 mg by mouth daily       atorvastatin (LIPITOR) 10 MG tablet Take 10 mg by mouth nightly      VITAMIN D PO Take 1 tablet by mouth nightly       Ferrous Sulfate (IRON PO) Take 1 tablet by mouth nightly       clopidogrel (PLAVIX) 75 MG tablet Take 1 tablet by mouth daily (Patient taking differently: Take 75 mg by mouth nightly ) 90 tablet 3    aspirin 81 MG EC tablet Take 1 tablet by mouth daily (Patient taking differently: Take 81 mg by mouth nightly ) 30 tablet 3    cyclobenzaprine (FLEXERIL) 10 MG tablet Take 10 mg by mouth nightly       DULoxetine (CYMBALTA) 60 MG extended release capsule Take 60 mg by mouth nightly       rOPINIRole (REQUIP) 3 MG tablet Take 3 mg by mouth nightly       montelukast (SINGULAIR) 10 MG tablet Take 10 mg by mouth nightly.  omeprazole (PRILOSEC) 20 MG capsule Take 20 mg by mouth nightly.  albuterol (PROAIR HFA) 108 (90 BASE) MCG/ACT inhaler Inhale 2 puffs into the lungs every 6 hours as needed. Allergies:     Allergies   Allergen Reactions    Morphine Hives and Nausea And Vomiting    Scopolamine Nausea Only    Adhesive Tape Other (See Comments)     Red skin      Ampicillin Hives, Itching and Nausea Only    Nitrofurantoin     Percocet [Oxycodone-Acetaminophen]     Propoxyphene     Vicodin [Hydrocodone-Acetaminophen]     Codeine Itching and Anxiety     \"Goes nuts\"       Problem List:    Patient Active Problem List   Diagnosis Code    Surgical wound infection T81.49XA    Asthma J45.909    TALI (obstructive sleep apnea) G47.33    OA (osteoarthritis) of knee M17.10    Morbid obesity due to excess calories (HCC) E66.01    Status post partial gastrectomy Z90.3    Right knee pain M25.561    Low back pain with radiation M54.50    Disc degeneration, lumbar M51.36    Neuropathy of right lower extremity G57.91    Status post total right knee replacement Z96.651    Sacroiliitis, not elsewhere classified ([facility] M46.1    Stroke-like symptoms R29.90    Cerebrovascular accident (CVA) due to embolism of cerebral artery (HCC) I63.40    PFO (patent foramen ovale) Q21.1    Lumbar radiculopathy M54.16    TIA (transient ischemic attack) G45.9    Vision loss of left eye H54.62    Mediastinal adenopathy R59.0    Pulmonary nodules R91.8       Past Medical History:        Diagnosis Date    Anxiety     Asthma     Cancer ([facility]     melanoma    Cerebral artery occlusion with cerebral infarction ([facility] 12/2020    GERD (gastroesophageal reflux disease)     Herniated lumbar intervertebral disc     l4    Hyperlipidemia     IBS (irritable bowel syndrome)     Migraine     OA (osteoarthritis)     PFO (patent foramen ovale)     repaired 2021    PONV (postoperative nausea and vomiting)     Pulmonary nodules     Sarcoidosis     Sleep apnea     uses CPAP    UTI (urinary tract infection)     FREQUENT       Past Surgical History:        Procedure Laterality Date    BREAST LUMPECTOMY      BRONCHOSCOPY N/A 2/15/2022    BRONCHOSCOPY W/EBUS FNA performed by Navin Jolley DO at [address]  2/15/2022    [address] ONLY performed by Navin Jolley DO at [address]  2021    PFO     CHOLECYSTECTOMY, LAPAROSCOPIC      ENDOMETRIAL ABLATION      HYSTERECTOMY      JOINT REPLACEMENT      BILAT KNEES    KNEE SURGERY Bilateral     Total of 17 knee surgeries    OTHER SURGICAL HISTORY  6/20/11    lap gastric sleeve    OTHER SURGICAL HISTORY Right 09/01/2017    RIGHT PATELLA REVISION      PAIN MANAGEMENT PROCEDURE Bilateral 8/27/2020    BILATERAL SACROILIAC JOINT INJECTION SITE CALCIUM 9.5 12/21/2021    BILITOT 0.3 12/21/2021    ALKPHOS 95 12/21/2021    AST 12 12/21/2021    ALT 14 12/21/2021       POC Tests: No results for input(s): POCGLU, POCNA, POCK, POCCL, POCBUN, POCHEMO, POCHCT in the last 72 hours. Coags:   Lab Results   Component Value Date    PROTIME 12.1 12/21/2021    INR 1.07 12/21/2021    APTT 35.4 12/21/2021       HCG (If Applicable):   Lab Results   Component Value Date    PREGTESTUR Neg 02/17/2010        ABGs: No results found for: PHART, PO2ART, GHD5XGN, XZP7QMR, BEART, L6KRVITO     Type & Screen (If Applicable):  Lab Results   Component Value Date    LABABO A 06/07/2011    LABRH Negative 06/07/2011       Drug/Infectious Status (If Applicable):  No results found for: HIV, HEPCAB    COVID-19 Screening (If Applicable):   Lab Results   Component Value Date    COVID19 DETECTED 01/20/2022    COVID19 Not Detected 12/11/2020           Anesthesia Evaluation  Patient summary reviewed and Nursing notes reviewed   history of anesthetic complications: PONV. Airway: Mallampati: III     Neck ROM: full   Dental:          Pulmonary:Negative Pulmonary ROS and normal exam    (+) sleep apnea:  asthma:                            Cardiovascular:Negative CV ROS    (+) hyperlipidemia               ROS comment: PFO repair       Neuro/Psych:   Negative Neuro/Psych ROS  (+) CVA (left side weakness): residual symptoms, neuromuscular disease (radiculopathy):, TIA, headaches: migraine headaches, depression/anxiety             GI/Hepatic/Renal: Neg GI/Hepatic/Renal ROS  (+) GERD: well controlled,      (-) hiatal hernia       Endo/Other: Negative Endo/Other ROS   (+) : arthritis:., .                 Abdominal:             Vascular: Other Findings:           Anesthesia Plan      general     ASA 3     (I discussed with the patient the risks and benefits of PIV, general anesthesia, IV Narcotics, PACU.   All questions were answered the patient agrees with the plan and wishes to proceed.  )  Induction: intravenous. Pre-Operative Diagnosis: History of colonic polyps [Z86.010]    59 y.o.   BMI:  Body mass index is 47.44 kg/m².      Vitals:    04/05/22 1105 04/12/22 0745   BP:  (!) 140/94   Pulse:  92   Resp:  20   Temp:  96.9 °F (36.1 °C)   TempSrc:  Temporal   SpO2:  99%   Weight: (!) 312 lb (141.5 kg) (!) 312 lb (141.5 kg)   Height: 5' 8\" (1.727 m) 5' 8\" (1.727 m)       Allergies   Allergen Reactions    Hydrocodone-Acetaminophen     Morphine Anaphylaxis and Hives    Scopolamine Nausea Only    Adhesive Tape Other (See Comments)     Red skin      Ampicillin Hives, Itching and Nausea Only    Nitrofurantoin     Percocet [Oxycodone-Acetaminophen]     Propoxyphene     Codeine Itching and Anxiety     \"Goes nuts\"       Social History     Tobacco Use    Smoking status: Never Smoker    Smokeless tobacco: Never Used   Substance Use Topics    Alcohol use: Yes     Comment: 2 drinks per year       LABS:    CBC  Lab Results   Component Value Date/Time    WBC 4.8 12/21/2021 06:11 AM    HGB 11.2 (L) 12/21/2021 06:11 AM    HCT 35.0 (L) 12/21/2021 06:11 AM     12/21/2021 06:11 AM     RENAL  Lab Results   Component Value Date/Time     12/21/2021 06:11 AM    K 4.1 12/21/2021 06:11 AM     12/21/2021 06:11 AM    CO2 24 12/21/2021 06:11 AM    BUN 7 12/21/2021 06:11 AM    CREATININE <0.5 (L) 12/21/2021 06:11 AM    GLUCOSE 103 (H) 12/21/2021 06:11 AM     COAGS  Lab Results   Component Value Date/Time    PROTIME 12.1 12/21/2021 06:11 AM    INR 1.07 12/21/2021 06:11 AM    APTT 35.4 12/21/2021 06:11 AM         Zen Larios MD   4/12/2022

## 2022-04-12 NOTE — PROCEDURES
Colonoscopy Procedure  Note          Patient: Gabriela Penn  : 1960  CSN:     Procedure: Colonoscopy with polypectomy (cold snare)    Date:  2022    Surgeon:  Torey Bird MD, MD    Referring Physician:  Dr. Shahnaz Pena    Preoperative Diagnosis:  History of colon polyps removed    Postoperative Diagnosis:  One transverse colon polyp removed    Anesthesia:  Propofol    EBL: <5 mL    Indications: This is a 59y.o. year old female who presents today with previous adenomatous polyp. Procedure: An informed consent was obtained from the patient after explanation of indications, benefits, possible risks and complications of the procedure. The patient was then taken to the endoscopy suite, placed in the left lateral decubitus position, and the above IV anesthesia was administered. With the patient in left lateral decubitus position and digital rectal examination was performed, no abnormalities noted. The scope was advanced all the way to the cecum, the mucosa appeared normal.  Appendix was identified. The terminal ileum was briefly intubated, the mucosa appeared normal.  Sigmoid diverticuli were seen. In the transverse colon a 4mm polyp was cold snared. On retroflexion internal anal hemorhoids were noted. The scope was straightened, the colon was decompressed and the scope was withdrawn from the patient. The patient tolerated the procedure well and was taken to the PACU in good condition. Impression:  Colon polyp removed, diverticuli, hemorrhoids    Recommendations:  Await pathology. Repeat colonoscopy in 5 years. Trulance for constipation will be sent to patient's pharmacy. Patient called with polyp results.     Torey Bird MD, MD   [facility]  2022

## 2022-04-12 NOTE — H&P
Gastroenterology Note             Pre-operative History and Physical    Patient: Sloane Rutherford  : 1960  CSN:     History Obtained From:  patient and/or guardian. HISTORY OF PRESENT ILLNESS:    The patient is a 59 y.o. female  here for colonoscopy.      Past Medical History:    Past Medical History:   Diagnosis Date    Anxiety     Asthma     Cancer ([facility]     melanoma    Cerebral artery occlusion with cerebral infarction ([facility] 2020    GERD (gastroesophageal reflux disease)     Herniated lumbar intervertebral disc     l4    Hyperlipidemia     IBS (irritable bowel syndrome)     Migraine     OA (osteoarthritis)     PFO (patent foramen ovale)     repaired     PONV (postoperative nausea and vomiting)     Pulmonary nodules     Sarcoidosis     Sleep apnea     uses CPAP    UTI (urinary tract infection)     FREQUENT     Past Surgical History:    Past Surgical History:   Procedure Laterality Date    BREAST LUMPECTOMY      BRONCHOSCOPY N/A 2/15/2022    BRONCHOSCOPY W/EBUS FNA performed by Bubba Blackburn DO at [address] [place] [address]  2/15/2022    BRONCHOSCOPY DIAGNOSTIC OR CELL [address] ONLY performed by Bubba Blackburn DO at [facility]      PFO     CHOLECYSTECTOMY, LAPAROSCOPIC      ENDOMETRIAL ABLATION      HYSTERECTOMY      JOINT REPLACEMENT      BILAT KNEES    KNEE SURGERY Bilateral     Total of 17 knee surgeries    OTHER SURGICAL HISTORY  11    lap gastric sleeve    OTHER SURGICAL HISTORY Right 2017    RIGHT PATELLA REVISION      PAIN MANAGEMENT PROCEDURE Bilateral 2020    BILATERAL SACROILIAC JOINT INJECTION SITE CONFIRMED BY FLUOROSCOPY performed by Aleja Luna MD at [address] [place] [address] Bilateral 10/22/2020    BILATERAL SACROILIAC JOINT INJECTION SITE CONFIRMED BY FLUOROSCOPY performed by Aleja Luna MD at [address] [place] [address] Left 1/21/2021    LEFT SACRAL ONE EPIDURAL STEROID INJECTION SITE CONFIRMED BY FLUOROSCOPY performed by Cornel Wade MD at [address] [employer] RESECTION  4-16-10    resection of open wound and mass of suprapubic area    SPINAL CORD STIMULATOR SURGERY      TUBAL LIGATION       Medications Prior to Admission:   No current facility-administered medications on file prior to encounter. Current Outpatient Medications on File Prior to Encounter   Medication Sig Dispense Refill    ferrous sulfate (IRON 325) 325 (65 Fe) MG tablet Take 325 mg by mouth daily (with breakfast)      bisacodyl (DULCOLAX) 5 MG EC tablet Take 5 mg by mouth daily      furosemide (LASIX) 40 MG tablet Take 1 tablet by mouth daily 90 tablet 1    predniSONE (DELTASONE) 50 MG tablet Take 40 mg by mouth daily       atorvastatin (LIPITOR) 10 MG tablet Take 10 mg by mouth nightly      VITAMIN D PO Take 1 tablet by mouth nightly       Ferrous Sulfate (IRON PO) Take 1 tablet by mouth nightly       clopidogrel (PLAVIX) 75 MG tablet Take 1 tablet by mouth daily (Patient taking differently: Take 75 mg by mouth nightly ) 90 tablet 3    aspirin 81 MG EC tablet Take 1 tablet by mouth daily (Patient taking differently: Take 81 mg by mouth nightly ) 30 tablet 3    cyclobenzaprine (FLEXERIL) 10 MG tablet Take 10 mg by mouth nightly       DULoxetine (CYMBALTA) 60 MG extended release capsule Take 60 mg by mouth nightly       rOPINIRole (REQUIP) 3 MG tablet Take 3 mg by mouth nightly       montelukast (SINGULAIR) 10 MG tablet Take 10 mg by mouth nightly.  omeprazole (PRILOSEC) 20 MG capsule Take 20 mg by mouth nightly.  albuterol (PROAIR HFA) 108 (90 BASE) MCG/ACT inhaler Inhale 2 puffs into the lungs every 6 hours as needed.           Allergies:  Hydrocodone-acetaminophen, Morphine, Scopolamine, Adhesive tape, Ampicillin, Nitrofurantoin, Percocet [oxycodone-acetaminophen], Propoxyphene, and Codeine      Social History: Social History     Tobacco Use    Smoking status: Never Smoker    Smokeless tobacco: Never Used   Substance Use Topics    Alcohol use: Yes     Comment: 2 drinks per year     Family History:   Family History   Problem Relation Age of Onset    Cancer Mother     Heart Disease Father     High Blood Pressure Father     Stroke Father     High Blood Pressure Sister     Diabetes Sister     High Blood Pressure Brother     Heart Disease Paternal Aunt     Heart Disease Paternal Uncle        PHYSICAL EXAM:      BP (!) 140/94   Pulse 92   Temp 96.9 °F (36.1 °C) (Temporal)   Resp 20   Ht 5' 8\" (1.727 m)   Wt (!) 312 lb (141.5 kg)   SpO2 99%   BMI 47.44 kg/m²  I        Heart:   RRR, normal s1s2    Lungs:  CTA bilat,  Normal effort    Abdomen:   NT, ND      ASA Grade:  ASA 3 - Patient with moderate systemic disease with functional limitations    Mallampati Class: 3          ASSESSMENT AND PLAN:    1. Patient is a 59 y.o. female here for Colonoscopy with MAC.   2.  Procedure options, risks and benefits reviewed with patient. Patient expresses understanding.     Newman Olszewski, MD,   Anamika Durand  4/12/2022

## 2022-04-12 NOTE — PROGRESS NOTES
Patient updated per Md. Discharged in no distress accompanied to passenger side of car with family or significant other driving car. Assessment unchanged. Patient denies pain.

## 2022-04-12 NOTE — ANESTHESIA POSTPROCEDURE EVALUATION
Department of Anesthesiology  Postprocedure Note    Patient: Aydee Mckeon  MRN: [id number]  YOB: 1960  Date of evaluation: 4/12/2022  Time:  4:45 PM     Procedure Summary     Date: 04/12/22 Room / Location: [facility] [place] [address] / [facility]    Anesthesia Start: 0813 Anesthesia Stop: 7129    Procedure: COLONOSCOPY POLYPECTOMY SNARE/COLD BIOPSY (N/A ) Diagnosis:       History of colonic polyps      (History of colonic polyps [Z86.010])    Surgeons: Leopoldo Neves MD Responsible Provider: Piper Lama MD    Anesthesia Type: general ASA Status: 3          Anesthesia Type: general    Maya Phase I: Maya Score: 10    Maya Phase II: Maya Score: 10    Last vitals: Reviewed and per EMR flowsheets. Anesthesia Post Evaluation    Patient location during evaluation: PACU  Patient participation: complete - patient participated  Level of consciousness: awake and alert  Airway patency: patent  Nausea & Vomiting: no nausea and no vomiting  Complications: no  Cardiovascular status: blood pressure returned to baseline  Respiratory status: acceptable  Hydration status: euvolemic  Comments: VSS on transfer to phase 2 recovery. No anesthetic complications.

## 2022-04-12 NOTE — H&P
Gastroenterology Note             Pre-operative History and Physical    Patient: Renetta Faria  : 1960  CSN:     History Obtained From:  patient and/or guardian. HISTORY OF PRESENT ILLNESS:    The patient is a 59 y.o. female  here for colonoscopy.      Past Medical History:    Past Medical History:   Diagnosis Date    Anxiety     Asthma     Cancer ([facility]     melanoma    Cerebral artery occlusion with cerebral infarction ([facility] 2020    GERD (gastroesophageal reflux disease)     Herniated lumbar intervertebral disc     l4    Hyperlipidemia     IBS (irritable bowel syndrome)     Migraine     OA (osteoarthritis)     PFO (patent foramen ovale)     repaired     PONV (postoperative nausea and vomiting)     Pulmonary nodules     Sarcoidosis     Sleep apnea     uses CPAP    UTI (urinary tract infection)     FREQUENT     Past Surgical History:    Past Surgical History:   Procedure Laterality Date    BREAST LUMPECTOMY      BRONCHOSCOPY N/A 2/15/2022    BRONCHOSCOPY W/EBUS FNA performed by Navin Jolley DO at [address] [place] [address]  2/15/2022    BRONCHOSCOPY DIAGNOSTIC OR CELL [address] ONLY performed by Navin Jolley DO at [facility]      PFO     CHOLECYSTECTOMY, LAPAROSCOPIC      ENDOMETRIAL ABLATION      HYSTERECTOMY      JOINT REPLACEMENT      BILAT KNEES    KNEE SURGERY Bilateral     Total of 17 knee surgeries    OTHER SURGICAL HISTORY  11    lap gastric sleeve    OTHER SURGICAL HISTORY Right 2017    RIGHT PATELLA REVISION      PAIN MANAGEMENT PROCEDURE Bilateral 2020    BILATERAL SACROILIAC JOINT INJECTION SITE CONFIRMED BY FLUOROSCOPY performed by Roberto Argueta MD at [facility] Bilateral 10/22/2020    BILATERAL SACROILIAC JOINT INJECTION SITE CONFIRMED BY FLUOROSCOPY performed by Roberto Argueta MD at [facility] Left 1/21/2021    LEFT SACRAL ONE EPIDURAL STEROID INJECTION SITE CONFIRMED BY FLUOROSCOPY performed by Aleja Luna MD at [address] [facility] [facility]  4-16-10    resection of open wound and mass of suprapubic area    SPINAL CORD STIMULATOR SURGERY      TUBAL LIGATION       Medications Prior to Admission:   No current facility-administered medications on file prior to encounter. Current Outpatient Medications on File Prior to Encounter   Medication Sig Dispense Refill    ferrous sulfate (IRON 325) 325 (65 Fe) MG tablet Take 325 mg by mouth daily (with breakfast)      bisacodyl (DULCOLAX) 5 MG EC tablet Take 5 mg by mouth daily      furosemide (LASIX) 40 MG tablet Take 1 tablet by mouth daily 90 tablet 1    predniSONE (DELTASONE) 50 MG tablet Take 40 mg by mouth daily       atorvastatin (LIPITOR) 10 MG tablet Take 10 mg by mouth nightly      VITAMIN D PO Take 1 tablet by mouth nightly       Ferrous Sulfate (IRON PO) Take 1 tablet by mouth nightly       clopidogrel (PLAVIX) 75 MG tablet Take 1 tablet by mouth daily (Patient taking differently: Take 75 mg by mouth nightly ) 90 tablet 3    aspirin 81 MG EC tablet Take 1 tablet by mouth daily (Patient taking differently: Take 81 mg by mouth nightly ) 30 tablet 3    cyclobenzaprine (FLEXERIL) 10 MG tablet Take 10 mg by mouth nightly       DULoxetine (CYMBALTA) 60 MG extended release capsule Take 60 mg by mouth nightly       rOPINIRole (REQUIP) 3 MG tablet Take 3 mg by mouth nightly       montelukast (SINGULAIR) 10 MG tablet Take 10 mg by mouth nightly.  omeprazole (PRILOSEC) 20 MG capsule Take 20 mg by mouth nightly.  albuterol (PROAIR HFA) 108 (90 BASE) MCG/ACT inhaler Inhale 2 puffs into the lungs every 6 hours as needed.           Allergies:  Hydrocodone-acetaminophen, Morphine, Scopolamine, Adhesive tape, Ampicillin, Nitrofurantoin, Percocet [oxycodone-acetaminophen], Propoxyphene, and Codeine      Social History: Social History     Tobacco Use    Smoking status: Never Smoker    Smokeless tobacco: Never Used   Substance Use Topics    Alcohol use: Yes     Comment: 2 drinks per year     Family History:   Family History   Problem Relation Age of Onset    Cancer Mother     Heart Disease Father     High Blood Pressure Father     Stroke Father     High Blood Pressure Sister     Diabetes Sister     High Blood Pressure Brother     Heart Disease Paternal Aunt     Heart Disease Paternal Uncle        PHYSICAL EXAM:      BP (!) 140/94   Pulse 92   Temp 96.9 °F (36.1 °C) (Temporal)   Resp 20   Ht 5' 8\" (1.727 m)   Wt (!) 312 lb (141.5 kg)   SpO2 99%   BMI 47.44 kg/m²  I        Heart:   RRR, normal s1s2    Lungs:  CTA bilat,  Normal effort    Abdomen:   NT, ND      ASA Grade:  ASA 3 - Patient with moderate systemic disease with functional limitations    Mallampati Class: 3          ASSESSMENT AND PLAN:    1. Patient is a 59 y.o. female here for Colonoscopy with MAC.   2.  Procedure options, risks and benefits reviewed with patient. Patient expresses understanding.     Morro Nesbitt MD,   [address]  4/12/2022

## 2022-04-22 ENCOUNTER — TELEPHONE (OUTPATIENT)
Dept: CARDIOLOGY CLINIC | Age: 62
End: 2022-04-22

## 2022-04-22 NOTE — TELEPHONE ENCOUNTER
Called patient and requested her to call to see if she also needs to hold aspirin and return call to our office. CARDIAC CLEARANCE     Procedure:Laminectomy with removal of paddle lead and battery  : Dr Tao Amos  Date: not yet scheduled    Medications needing held: Plavix      How long?: 7 days    Phone Number and Contact Name for Physicians office: Dr Alexander Fang    no phone number on request form   Fax number to send information: [phone]    Clinical staff:    Cardiologist: Dr. Linh Turpin  Last Appointment: 12/13/21  Next Appointment: not scheduled. Requested to return in six months. Cardiac History:          PFO  TIA per neurology when she was seen on 12/10/2020. S/p PFO closure 12/2/2021. Continue Asa, Plavix and statin therapy. Will need prophylactic antibiotics. Repeat echocardiogram in 6 months.      TIA  With motor and sensory deficits of the right upper and lower extremity.  Continue Asa, Plavix and statin therapy.

## 2022-04-25 NOTE — TELEPHONE ENCOUNTER
Preoperative risk assessment    Patient planning to have Laminectomy with removal of paddle lead and battery    She needs to hold Asa and Plavix x 7 days.      Hx PFO s/p PFO closure 12/10/2020,TIA  Last visit 12/13/2021

## 2022-04-25 NOTE — TELEPHONE ENCOUNTER
Patient returned call and states she does need to hold her asa and Plavix 7 days prior. Please advise if this is ok.

## 2022-04-25 NOTE — TELEPHONE ENCOUNTER
Discussed with Dr Demario zaidi to proceed with procedure and hold medications. Please prepare letter and make patient aware.

## 2022-04-25 NOTE — TELEPHONE ENCOUNTER
MA's   please update once we know about her ASA therapy requestto be held along with her Plavix. Follow up if you do not hear back in a timely manner. Thanks.

## 2022-04-27 RX ORDER — KETOCONAZOLE 20 MG/G
CREAM TOPICAL
COMMUNITY
Start: 2022-04-25 | End: 2022-09-09 | Stop reason: ALTCHOICE

## 2022-04-27 RX ORDER — PREDNISONE 10 MG/1
5 TABLET ORAL DAILY
COMMUNITY

## 2022-04-27 NOTE — PROGRESS NOTES
Patient denies pharmacy consult offered due to pain med allergies. She stated she has lauren through may surgeries and feels her and Dr. Erik Swartz have post-op pain management plan under control.  Samantha Kamara

## 2022-04-27 NOTE — PROGRESS NOTES
Place patient label inside box (if no patient label, complete below)  Name:  :  MR#:   Irma Durham / PROCEDURE  1. I (we), Trish Medeirosmarcela (Patient Name) authorize DR. Inessa Ruelas (Provider / Laurence Delvalle) and/or such assistants as may be selected by him/her, to perform the following operation/procedure(s): THORACIC LAMINECTOMY WITH REMOVAL OF PADDLE LEAD AND BATTERY       Note: If unable to obtain consent prior to an emergent procedure, document the emergent reason in the medical record. This procedure has been explained to my (our) satisfaction and included in the explanation was:  A) The intended benefit, nature, and extent of the procedure to be performed;  B) The significant risks involved and the probability of success;  C) Alternative procedures and methods of treatment;  D) The dangers and probable consequences of such alternatives (including no procedure or treatment); E) The expected consequences of the procedure on my future health;  F) Whether other qualified individuals would be performing important surgical tasks and/or whether  would be present to advise or support the procedure. I (we) understand that there are other risks of infection and other serious complications in the pre-operative/procedural and postoperative/procedural stages of my (our) care. I (we) have asked all of the questions which I (we) thought were important in deciding whether or not to undergo treatment or diagnosis. These questions have been answered to my (our) satisfaction. I (we) understand that no assurance can be given that the procedure will be a success, and no guarantee or warranty of success has been given to me (us).     2. It has been explained to me (us) that during the course of the operation/procedure, unforeseen conditions may be revealed that necessitate extension of the original procedure(s) or different procedure(s) than those set forth in Paragraph 1. I (we) authorize and request that the above-named physician, his/her assistants or his/her designees, perform procedures as necessary and desirable if deemed to be in my (our) best interest.     Revised 8/2/2021                                                                          Page 1 of 2       3. I acknowledge that health care personnel may be observing this procedure for the purpose of medical education or other specified purposes as may be necessary as requested and/or approved by my (our) physician. 4. I (we) consent to the disposal by the hospital Pathologist of the removed tissue, parts or organs in accordance with hospital policy. 5. I do ____ do not ____ consent to the use of a local infiltration pain blocking agent that will be used by my provider/surgical provider to help alleviate pain during my procedure. 6. I do ____ do not ____ consent to an emergent blood transfusion in the case of a life-threatening situation that requires blood components to be administered. This consent is valid for 24 hours from the beginning of the procedure. 7. This patient does ____ or does not ____ currently have a DNR status/order. If DNR order is in place, obtain Addendum to the Surgical Consent for ALL Patients with a DNR Order to address taylor-operative status for limited intervention or DNR suspension.      8. I have read and fully understand the above Consent for Operation/Procedure and that all blanks were completed before I signed the consent.   _____________________________       _____________________      ____/____am/pm  Signature of Patient or legal representative      Printed Name / Relationship            Date / Time   ____________________________       _____________________      ____/____am/pm  Witness to Signature                                    Printed Name                    Date / Time     If patient is unable to sign or is a minor, complete the following)  Patient is a minor, ____ years of age, or unable to sign because:   ______________________________________________________________________________________________    [facility] If a phone consent is obtained, consent will be documented by using two health care professionals, each affirming that the consenting party has no questions and gives consent for the procedure discussed with the physician/provider.   _____________________          ____________________       _____/_____am/pm   2nd witness to phone consent        Printed name           Date / Time    Informed Consent:  I have provided the explanation described above in section 1 to the patient and/or legal representative.  I have provided the patient and/or legal representative with an opportunity to ask any questions about the proposed operation/procedure.   ___________________________          ____________________         ____/____am/pm  Provider / Proceduralist                            Printed name            Date / Time  Revised 8/2/2021                                                                      Page 2 of 2

## 2022-04-27 NOTE — PROGRESS NOTES
[facility] PRE-SURGICAL TESTING INSTRUCTIONS                                  PRIOR TO PROCEDURE DATE:        1. PLEASE FOLLOW ANY  GUIDELINES/ INSTRUCTIONS PRIOR TO YOUR PROCEDURE AS ADVISED BY YOUR SURGEON. 2. Arrange for someone to drive you home and be with you for the first 24 hours after discharge for your safety after your procedure for which you received sedation. Ensure it is someone we can share information with regarding your discharge. 3. You must contact your surgeon for instructions IF:   You are taking any blood thinners, aspirin, anti-inflammatory or vitamin E.   There is a change in your physical condition such as a cold, fever, rash, cuts, sores or any other infection, especially near your surgical site. 4. Do not drink alcohol the day before or day of your procedure. 5. A Pre-op History and Physical for surgery MUST be completed by your Physician or Urgent Care within 30 days of your procedure date. Please bring a copy with you on the day of your procedure and along with any other testing performed. THE DAY OF YOUR PROCEDURE:  1. Follow instructions for ARRIVAL TIME as DIRECTED BY YOUR SURGEON. 2. Enter the MAIN entrance from [employer] and follow the signs to the free [employer] or [employer] parking (offered free of charge 6am-5pm). 3. Enter the Main Entrance of the hospital (do not enter from the lower level of the parking garage). Upon entrance, check in with the  at the main desk on your left. If no one is available at the desk, proceed into the [facility] Waiting Room and go through the door directly into the [facility]. There is a Check-in desk ACROSS from Room 5 (marked with a sign hanging from the ceiling). The phone number for the surgery center is [phone]. 4. Please call [phone] option #2 option #2 if you have not been preregistered yet.   On the day of your procedure bring your insurance card and photo ID. You will be registered at your bedside once brought back to your room. 5. DO NOT EAT ANYTHING eight hours prior to your arrival for surgery. May have 8 ounces of water 4 hours prior to your arrival for surgery. NOTE: ALL Gastric, Bariatric and Bowel surgery patients MUST follow their surgeon's instructions. 6. MEDICATIONS    Take the following medications with a SMALL sip of water: PREDNISONE    Bariatric patient's call surgeon if on diabetic medications as some need to be stopped 1 week preop   Use your usual dose of inhalers the morning of surgery. BRING your rescue inhaler with you to hospital.    Anesthesia does NOT want you to take insulin the morning of surgery. They will control your blood sugar while you are at the hospital. Please contact your ordering physician for instructions regarding your insulin the night before your procedure. If you have an insulin pump, please keep it set on basal rate. 7. Do not swallow water when brushing teeth. No gum, candy, mints or ice chips. Refrain from smoking or at least decrease the amount. 8. Dress in loose, comfortable clothing appropriate for redressing after your procedure. Do not wear jewelry (including body piercings), make-up (especially NO eye make-up), fingernail polish (NO toenail polish if foot/leg surgery), lotion, powders or metal hairclips. 9. Dentures, glasses, or contacts will need to be removed before your procedure. Bring cases for your glasses, contacts, dentures, or hearing aids to protect them while you are in surgery. 10. If you use a CPAP, please bring it with you on the day of your procedure. 11. We recommend that valuable personal  belongings such as cash, cell phones, e-tablets or jewelry, be left at home during your stay. The hospital will not be responsible for valuables that are not secured in the hospital safe.  However, if your insurance requires a co-pay, you may want to bring a method of payment, i.e. Check or credit card, if you wish to pay your co-pay the day of surgery. 12. If you are to stay overnight, you may bring a bag with personal items. Please have any large items you may need brought in by your family after your arrival to your hospital room. 15. If you have a Living Will or Durable Power of , please bring a copy on the day of your procedure. 15. With your permission, one family member may accompany you while you are being prepared for surgery. Once you are ready, additional family members may join you. HOW WE KEEP YOU SAFE and WORK TO PREVENT SURGICAL SITE INFECTIONS:  1. Health care workers should always check your ID bracelet to verify your name and birth date. You will be asked many times to state your name, date of birth, and allergies. 2. Health care workers should always clean their hands with soap or alcohol gel before providing care to you. It is okay to ask anyone if they cleaned their hands before they touch you. 3. You will be actively involved in verifying the type of procedure you are having and ensuring the correct surgical site. This will be confirmed multiple times prior to your procedure. Do NOT darren your surgery site UNLESS instructed to by your surgeon. 4. Do not shave or wax for 72 hours prior to procedure near your operative site. Shaving with a razor can irritate your skin and make it easier to develop an infection. On the day of your procedure, any hair that needs to be removed near the surgical site will be clipped by a healthcare worker using a special clippers designed to avoid skin irritation. 5. When you are in the operating room, your surgical site will be cleansed with a special soap, and in most cases, you will be given an antibiotic before the surgery begins. What to expect AFTER YOUR PROCEDURE:  1. Immediately following your procedure, your will be taken to the PACU for the first phase of your recovery.   Your nurse will help you recover from any potential side effects of anesthesia, such as extreme drowsiness, changes in your vital signs or breathing patterns. Nausea, headache, muscle aches, or sore throat may also occur after anesthesia. Your nurse will help you manage these potential side effects. 2. For comfort and safety, arrange to have someone at home with you for the first 24 hours after discharge. 3. You and your family will be given written instructions about your diet, activity, dressing care, medications, and return visits. 4. Once at home, should issues with nausea, pain, or bleeding occur, or should you notice any signs of infection, you should call your surgeon. 5. Always clean your hands before and after caring for your wound. Do not let your family touch your surgery site without cleaning their hands. 6. Narcotic pain medications can cause significant constipation. You may want to add a stool softener to your postoperative medication schedule or speak to your surgeon on how best to manage this SIDE EFFECT. SPECIAL INSTRUCTIONS     Thank you for allowing us to care for you. We strive to exceed your expectations in the delivery of care and service provided to you and your family. If you need to contact the [place] [address] staff for any reason, please call us at [phone]    Instructions reviewed with patient during preadmission testing phone interview.   Randy Chandler RN.4/27/2022 .11:05 AM      ADDITIONAL EDUCATIONAL INFORMATION REVIEWED PER PHONE WITH YOU AND/OR YOUR FAMILY:  Yes Hibiclens® Bathing Instructions - PER SURGEON INSTRUCTIONS   No Antibacterial Soap

## 2022-04-28 ENCOUNTER — OFFICE VISIT (OUTPATIENT)
Dept: PULMONOLOGY | Age: 62
End: 2022-04-28
Payer: COMMERCIAL

## 2022-04-28 VITALS
RESPIRATION RATE: 21 BRPM | BODY MASS INDEX: 44.41 KG/M2 | HEIGHT: 68 IN | SYSTOLIC BLOOD PRESSURE: 125 MMHG | DIASTOLIC BLOOD PRESSURE: 80 MMHG | TEMPERATURE: 96 F | WEIGHT: 293 LBS | HEART RATE: 111 BPM | OXYGEN SATURATION: 94 %

## 2022-04-28 DIAGNOSIS — R91.8 PULMONARY NODULES: ICD-10-CM

## 2022-04-28 DIAGNOSIS — Z99.89 OSA ON CPAP: Primary | ICD-10-CM

## 2022-04-28 DIAGNOSIS — G47.33 OSA ON CPAP: Primary | ICD-10-CM

## 2022-04-28 DIAGNOSIS — D86.9 SARCOIDOSIS: ICD-10-CM

## 2022-04-28 PROCEDURE — 99214 OFFICE O/P EST MOD 30 MIN: CPT | Performed by: INTERNAL MEDICINE

## 2022-04-28 NOTE — PROGRESS NOTES
Chief complaint  This is a 59y.o. year old female  who comes to see me with a chief complaint of   Chief Complaint   Patient presents with    Follow-up     TALI 2 months        HPI  Here with a cc of sarcoidosis and TALI follow up     Did PFTS last time (mild reduction in diffusing capacity) but still SOB. Has not used albuterol much if at all. Used to use it for asthmatic bronchitis. Down to 10 mg per day of prednisone (mainly eye manifestations) per Ophthalmology. Trying to wean her down. Visual issue still are fine. Machine:  Patient is using an APAP machine. Average usage is 7 hrs 24 min 00 sec. She is meeting 4 or more hours per night 93% of the time. Average AHI is 0.6. Patient admits to good compliance and feels rested for the most part . Not other real changes.   Just SOB with exertion           Current Outpatient Medications:     ketoconazole (NIZORAL) 2 % cream, Apply to rash under breasts bid, Disp: , Rfl:     metroNIDAZOLE-Cleanser 0.75 % CREAM KIT, Apply topically as needed, Disp: , Rfl:     predniSONE (DELTASONE) 10 MG tablet, Take 10 mg by mouth daily, Disp: , Rfl:     ferrous sulfate (IRON 325) 325 (65 Fe) MG tablet, Take 325 mg by mouth daily (with breakfast), Disp: , Rfl:     Plecanatide (TRULANCE) 3 MG TABS, Take 3 mg by mouth daily, Disp: 90 tablet, Rfl: 5    furosemide (LASIX) 40 MG tablet, Take 1 tablet by mouth daily, Disp: 90 tablet, Rfl: 1    atorvastatin (LIPITOR) 10 MG tablet, Take 10 mg by mouth nightly, Disp: , Rfl:     VITAMIN D PO, Take 1 tablet by mouth nightly , Disp: , Rfl:     clopidogrel (PLAVIX) 75 MG tablet, Take 1 tablet by mouth daily (Patient taking differently: Take 75 mg by mouth nightly ), Disp: 90 tablet, Rfl: 3    aspirin 81 MG EC tablet, Take 1 tablet by mouth daily (Patient taking differently: Take 81 mg by mouth nightly ), Disp: 30 tablet, Rfl: 3    cyclobenzaprine (FLEXERIL) 10 MG tablet, Take 10 mg by mouth nightly , Disp: , Rfl:   DULoxetine (CYMBALTA) 60 MG extended release capsule, Take 60 mg by mouth nightly , Disp: , Rfl:     rOPINIRole (REQUIP) 3 MG tablet, Take 3 mg by mouth nightly , Disp: , Rfl:     montelukast (SINGULAIR) 10 MG tablet, Take 10 mg by mouth nightly., Disp: , Rfl:     omeprazole (PRILOSEC) 20 MG capsule, Take 20 mg by mouth nightly., Disp: , Rfl:     albuterol (PROAIR HFA) 108 (90 BASE) MCG/ACT inhaler, Inhale 2 puffs into the lungs every 6 hours as needed. , Disp: , Rfl:       PHYSICAL EXAM:  Vitals:    04/28/22 0715   BP: 125/80   Pulse: 111   Resp: 21   Temp: 96 °F (35.6 °C)   SpO2: 94%       GENERAL:  Well nourished, alert, appears stated age, no distress  HEENT:  No scleral icterus, no conjunctival irritation  NECK:  No thyromegaly, no bruits  LYMPH:  No cervical or supraclavicular adenopathy  HEART:  Tachycardic, no murmurs  LUNGS:  Clear bilaterally   ABDOMEN:  No distention, no organomegaly  EXTREMITIES:  + edema, no digital clubbing  NEURO:  No localizing deficits, CN II-XII intact    Pulmonary Function Testing 3/22  My impression:  Isolated mildly reduced diffusing capacity with bronchodilator response in mid-flows     Chest imaging from 12/21 is reviewed. My interpretation is mediastinal and hilar adenopathy with associated scattered nodules. EBUS 2/2022  A. 11L lymph node, endobronchial ultrasound guided fine needle        aspiration:      - Scant small lymphocytes, non-necrotizing granulomatous inflammation        and fibrosis      - No malignant cells identified      - GMS special staining is pending and will be reported in an addendum        B. 7 lymph node, endobronchial ultrasound guided fine needle        aspiration:      - Hypocellular specimen      - Predominantly blood, cartilage, and benign-appearing epithelial        cells     ECHO 12/21  Normal left ventricular size. Moderate concentric left ventricular   hypertrophy.  Normal left ventricular systolic function with an estimated ejection fraction of 65%. There is a 35 mm pfo closure device that appears well seated. A bubble study was performed and fails to show evidence of shunting. I reviewed above labs and studies pertinent to this visit and date    Assessment/Plan:  1. TALI on CPAP  Benefiting and compliant. Benefiting from therapy by a low Delta score, good energy levels, low fatigue, and control of chronic medical problems    2. Sarcoidosis  Essentially stable. May have ocular manifestation more than anything else. On prednisone per eye doc. Weaning. No changes in breathing. Still SOB with exertion. Try using albuterol prn and I will add on from there. Some bronchodilator response in mid-flows    3. Pulmonary nodules  Small. Likely granulomas.   CT in June to monitor    Follow up in 6 months

## 2022-05-03 ENCOUNTER — ANESTHESIA (OUTPATIENT)
Dept: OPERATING ROOM | Age: 62
End: 2022-05-03
Payer: COMMERCIAL

## 2022-05-03 ENCOUNTER — ANESTHESIA EVENT (OUTPATIENT)
Dept: OPERATING ROOM | Age: 62
End: 2022-05-03
Payer: COMMERCIAL

## 2022-05-03 ENCOUNTER — HOSPITAL ENCOUNTER (OUTPATIENT)
Age: 62
Setting detail: OUTPATIENT SURGERY
Discharge: HOME | End: 2022-05-03
Attending: NEUROLOGICAL SURGERY | Admitting: NEUROLOGICAL SURGERY
Payer: COMMERCIAL

## 2022-05-03 VITALS
DIASTOLIC BLOOD PRESSURE: 82 MMHG | SYSTOLIC BLOOD PRESSURE: 124 MMHG | HEART RATE: 88 BPM | RESPIRATION RATE: 16 BRPM | TEMPERATURE: 97.1 F | BODY MASS INDEX: 44.41 KG/M2 | HEIGHT: 68 IN | OXYGEN SATURATION: 99 % | WEIGHT: 293 LBS

## 2022-05-03 VITALS
DIASTOLIC BLOOD PRESSURE: 74 MMHG | RESPIRATION RATE: 15 BRPM | SYSTOLIC BLOOD PRESSURE: 105 MMHG | OXYGEN SATURATION: 100 %

## 2022-05-03 DIAGNOSIS — T85.113A: Primary | ICD-10-CM

## 2022-05-03 DIAGNOSIS — M54.16 LUMBAR RADICULOPATHY: ICD-10-CM

## 2022-05-03 DIAGNOSIS — M54.50 LOW BACK PAIN WITH RADIATION: ICD-10-CM

## 2022-05-03 LAB
ABO/RH: NORMAL
ANTIBODY SCREEN: NORMAL

## 2022-05-03 PROCEDURE — 7100000010 HC PHASE II RECOVERY - FIRST 15 MIN: Performed by: NEUROLOGICAL SURGERY

## 2022-05-03 PROCEDURE — 6360000002 HC RX W HCPCS: Performed by: NURSE ANESTHETIST, CERTIFIED REGISTERED

## 2022-05-03 PROCEDURE — 2500000003 HC RX 250 WO HCPCS: Performed by: NURSE ANESTHETIST, CERTIFIED REGISTERED

## 2022-05-03 PROCEDURE — 7100000000 HC PACU RECOVERY - FIRST 15 MIN: Performed by: NEUROLOGICAL SURGERY

## 2022-05-03 PROCEDURE — 3600000014 HC SURGERY LEVEL 4 ADDTL 15MIN: Performed by: NEUROLOGICAL SURGERY

## 2022-05-03 PROCEDURE — 2580000003 HC RX 258: Performed by: ANESTHESIOLOGY

## 2022-05-03 PROCEDURE — 2580000003 HC RX 258: Performed by: NEUROLOGICAL SURGERY

## 2022-05-03 PROCEDURE — 86850 RBC ANTIBODY SCREEN: CPT

## 2022-05-03 PROCEDURE — 7100000001 HC PACU RECOVERY - ADDTL 15 MIN: Performed by: NEUROLOGICAL SURGERY

## 2022-05-03 PROCEDURE — 2709999900 HC NON-CHARGEABLE SUPPLY: Performed by: NEUROLOGICAL SURGERY

## 2022-05-03 PROCEDURE — 86901 BLOOD TYPING SEROLOGIC RH(D): CPT

## 2022-05-03 PROCEDURE — 3600000004 HC SURGERY LEVEL 4 BASE: Performed by: NEUROLOGICAL SURGERY

## 2022-05-03 PROCEDURE — 93005 ELECTROCARDIOGRAM TRACING: CPT | Performed by: NEUROLOGICAL SURGERY

## 2022-05-03 PROCEDURE — 86900 BLOOD TYPING SEROLOGIC ABO: CPT

## 2022-05-03 PROCEDURE — 2500000003 HC RX 250 WO HCPCS: Performed by: NEUROLOGICAL SURGERY

## 2022-05-03 PROCEDURE — 7100000011 HC PHASE II RECOVERY - ADDTL 15 MIN: Performed by: NEUROLOGICAL SURGERY

## 2022-05-03 PROCEDURE — 6360000002 HC RX W HCPCS: Performed by: ANESTHESIOLOGY

## 2022-05-03 PROCEDURE — 6370000000 HC RX 637 (ALT 250 FOR IP): Performed by: ANESTHESIOLOGY

## 2022-05-03 PROCEDURE — 3700000001 HC ADD 15 MINUTES (ANESTHESIA): Performed by: NEUROLOGICAL SURGERY

## 2022-05-03 PROCEDURE — 3700000000 HC ANESTHESIA ATTENDED CARE: Performed by: NEUROLOGICAL SURGERY

## 2022-05-03 PROCEDURE — A4217 STERILE WATER/SALINE, 500 ML: HCPCS | Performed by: NEUROLOGICAL SURGERY

## 2022-05-03 RX ORDER — SODIUM CHLORIDE 9 MG/ML
INJECTION, SOLUTION INTRAVENOUS PRN
Status: DISCONTINUED | OUTPATIENT
Start: 2022-05-03 | End: 2022-05-03 | Stop reason: HOSPADM

## 2022-05-03 RX ORDER — ONDANSETRON 2 MG/ML
INJECTION INTRAMUSCULAR; INTRAVENOUS PRN
Status: DISCONTINUED | OUTPATIENT
Start: 2022-05-03 | End: 2022-05-03 | Stop reason: SDUPTHER

## 2022-05-03 RX ORDER — PHENYLEPHRINE HYDROCHLORIDE 10 MG/ML
INJECTION INTRAVENOUS PRN
Status: DISCONTINUED | OUTPATIENT
Start: 2022-05-03 | End: 2022-05-03 | Stop reason: SDUPTHER

## 2022-05-03 RX ORDER — SUCCINYLCHOLINE/SOD CL,ISO/PF 200MG/10ML
SYRINGE (ML) INTRAVENOUS PRN
Status: DISCONTINUED | OUTPATIENT
Start: 2022-05-03 | End: 2022-05-03 | Stop reason: SDUPTHER

## 2022-05-03 RX ORDER — HYDROCODONE BITARTRATE AND ACETAMINOPHEN 7.5; 325 MG/1; MG/1
1 TABLET ORAL EVERY 6 HOURS PRN
Qty: 40 TABLET | Refills: 0 | Status: SHIPPED | OUTPATIENT
Start: 2022-05-03 | End: 2022-05-17

## 2022-05-03 RX ORDER — MAGNESIUM HYDROXIDE 1200 MG/15ML
LIQUID ORAL CONTINUOUS PRN
Status: DISCONTINUED | OUTPATIENT
Start: 2022-05-03 | End: 2022-05-03 | Stop reason: HOSPADM

## 2022-05-03 RX ORDER — SODIUM CHLORIDE 0.9 % (FLUSH) 0.9 %
5-40 SYRINGE (ML) INJECTION EVERY 12 HOURS SCHEDULED
Status: DISCONTINUED | OUTPATIENT
Start: 2022-05-03 | End: 2022-05-03 | Stop reason: HOSPADM

## 2022-05-03 RX ORDER — ROCURONIUM BROMIDE 10 MG/ML
INJECTION, SOLUTION INTRAVENOUS PRN
Status: DISCONTINUED | OUTPATIENT
Start: 2022-05-03 | End: 2022-05-03 | Stop reason: SDUPTHER

## 2022-05-03 RX ORDER — MIDAZOLAM HYDROCHLORIDE 1 MG/ML
INJECTION INTRAMUSCULAR; INTRAVENOUS PRN
Status: DISCONTINUED | OUTPATIENT
Start: 2022-05-03 | End: 2022-05-03 | Stop reason: SDUPTHER

## 2022-05-03 RX ORDER — SODIUM CHLORIDE 0.9 % (FLUSH) 0.9 %
5-40 SYRINGE (ML) INJECTION PRN
Status: DISCONTINUED | OUTPATIENT
Start: 2022-05-03 | End: 2022-05-03 | Stop reason: HOSPADM

## 2022-05-03 RX ORDER — DEXMEDETOMIDINE HYDROCHLORIDE 100 UG/ML
INJECTION, SOLUTION INTRAVENOUS PRN
Status: DISCONTINUED | OUTPATIENT
Start: 2022-05-03 | End: 2022-05-03 | Stop reason: SDUPTHER

## 2022-05-03 RX ORDER — SODIUM CHLORIDE 9 MG/ML
25 INJECTION, SOLUTION INTRAVENOUS PRN
Status: DISCONTINUED | OUTPATIENT
Start: 2022-05-03 | End: 2022-05-03 | Stop reason: HOSPADM

## 2022-05-03 RX ORDER — IPRATROPIUM BROMIDE AND ALBUTEROL SULFATE 2.5; .5 MG/3ML; MG/3ML
1 SOLUTION RESPIRATORY (INHALATION)
Status: DISCONTINUED | OUTPATIENT
Start: 2022-05-03 | End: 2022-05-03 | Stop reason: HOSPADM

## 2022-05-03 RX ORDER — HYDROCODONE BITARTRATE AND ACETAMINOPHEN 7.5; 325 MG/1; MG/1
1 TABLET ORAL ONCE
Status: COMPLETED | OUTPATIENT
Start: 2022-05-03 | End: 2022-05-03

## 2022-05-03 RX ORDER — LORAZEPAM 2 MG/ML
0.5 INJECTION INTRAMUSCULAR
Status: DISCONTINUED | OUTPATIENT
Start: 2022-05-03 | End: 2022-05-03 | Stop reason: HOSPADM

## 2022-05-03 RX ORDER — FENTANYL CITRATE 50 UG/ML
INJECTION, SOLUTION INTRAMUSCULAR; INTRAVENOUS PRN
Status: DISCONTINUED | OUTPATIENT
Start: 2022-05-03 | End: 2022-05-03 | Stop reason: SDUPTHER

## 2022-05-03 RX ORDER — PROPOFOL 10 MG/ML
INJECTION, EMULSION INTRAVENOUS PRN
Status: DISCONTINUED | OUTPATIENT
Start: 2022-05-03 | End: 2022-05-03 | Stop reason: SDUPTHER

## 2022-05-03 RX ORDER — APREPITANT 40 MG/1
40 CAPSULE ORAL ONCE
Status: COMPLETED | OUTPATIENT
Start: 2022-05-03 | End: 2022-05-03

## 2022-05-03 RX ORDER — LABETALOL HYDROCHLORIDE 5 MG/ML
10 INJECTION, SOLUTION INTRAVENOUS
Status: DISCONTINUED | OUTPATIENT
Start: 2022-05-03 | End: 2022-05-03 | Stop reason: HOSPADM

## 2022-05-03 RX ORDER — METOCLOPRAMIDE HYDROCHLORIDE 5 MG/ML
10 INJECTION INTRAMUSCULAR; INTRAVENOUS
Status: DISCONTINUED | OUTPATIENT
Start: 2022-05-03 | End: 2022-05-03 | Stop reason: HOSPADM

## 2022-05-03 RX ORDER — CLINDAMYCIN PHOSPHATE 900 MG/50ML
900 INJECTION INTRAVENOUS ONCE
Status: COMPLETED | OUTPATIENT
Start: 2022-05-03 | End: 2022-05-03

## 2022-05-03 RX ORDER — ONDANSETRON 2 MG/ML
4 INJECTION INTRAMUSCULAR; INTRAVENOUS
Status: DISCONTINUED | OUTPATIENT
Start: 2022-05-03 | End: 2022-05-03 | Stop reason: HOSPADM

## 2022-05-03 RX ORDER — LIDOCAINE HYDROCHLORIDE 10 MG/ML
1 INJECTION, SOLUTION EPIDURAL; INFILTRATION; INTRACAUDAL; PERINEURAL
Status: DISCONTINUED | OUTPATIENT
Start: 2022-05-03 | End: 2022-05-03 | Stop reason: HOSPADM

## 2022-05-03 RX ORDER — FENTANYL CITRATE 50 UG/ML
50 INJECTION, SOLUTION INTRAMUSCULAR; INTRAVENOUS EVERY 5 MIN PRN
Status: DISCONTINUED | OUTPATIENT
Start: 2022-05-03 | End: 2022-05-03 | Stop reason: HOSPADM

## 2022-05-03 RX ORDER — SODIUM CHLORIDE, SODIUM LACTATE, POTASSIUM CHLORIDE, CALCIUM CHLORIDE 600; 310; 30; 20 MG/100ML; MG/100ML; MG/100ML; MG/100ML
INJECTION, SOLUTION INTRAVENOUS CONTINUOUS
Status: DISCONTINUED | OUTPATIENT
Start: 2022-05-03 | End: 2022-05-03 | Stop reason: HOSPADM

## 2022-05-03 RX ORDER — DEXAMETHASONE SODIUM PHOSPHATE 4 MG/ML
INJECTION, SOLUTION INTRA-ARTICULAR; INTRALESIONAL; INTRAMUSCULAR; INTRAVENOUS; SOFT TISSUE PRN
Status: DISCONTINUED | OUTPATIENT
Start: 2022-05-03 | End: 2022-05-03 | Stop reason: SDUPTHER

## 2022-05-03 RX ORDER — LIDOCAINE HYDROCHLORIDE 20 MG/ML
INJECTION, SOLUTION INTRAVENOUS PRN
Status: DISCONTINUED | OUTPATIENT
Start: 2022-05-03 | End: 2022-05-03 | Stop reason: SDUPTHER

## 2022-05-03 RX ADMIN — FENTANYL CITRATE 100 MCG: 50 INJECTION, SOLUTION INTRAMUSCULAR; INTRAVENOUS at 09:04

## 2022-05-03 RX ADMIN — Medication 140 MG: at 09:07

## 2022-05-03 RX ADMIN — SODIUM CHLORIDE, POTASSIUM CHLORIDE, SODIUM LACTATE AND CALCIUM CHLORIDE: 600; 310; 30; 20 INJECTION, SOLUTION INTRAVENOUS at 08:32

## 2022-05-03 RX ADMIN — PHENYLEPHRINE HYDROCHLORIDE 100 MCG: 10 INJECTION INTRAVENOUS at 10:07

## 2022-05-03 RX ADMIN — LIDOCAINE HYDROCHLORIDE 100 MG: 20 INJECTION, SOLUTION INTRAVENOUS at 09:07

## 2022-05-03 RX ADMIN — ONDANSETRON 4 MG: 2 INJECTION INTRAMUSCULAR; INTRAVENOUS at 10:04

## 2022-05-03 RX ADMIN — DEXMEDETOMIDINE HYDROCHLORIDE 4 MCG: 100 INJECTION, SOLUTION INTRAVENOUS at 09:53

## 2022-05-03 RX ADMIN — DEXMEDETOMIDINE HYDROCHLORIDE 4 MCG: 100 INJECTION, SOLUTION INTRAVENOUS at 09:56

## 2022-05-03 RX ADMIN — DEXMEDETOMIDINE HYDROCHLORIDE 4 MCG: 100 INJECTION, SOLUTION INTRAVENOUS at 10:04

## 2022-05-03 RX ADMIN — ROCURONIUM BROMIDE 40 MG: 10 INJECTION INTRAVENOUS at 09:36

## 2022-05-03 RX ADMIN — PROPOFOL 200 MG: 10 INJECTION, EMULSION INTRAVENOUS at 09:07

## 2022-05-03 RX ADMIN — HYDROCODONE BITARTRATE AND ACETAMINOPHEN 1 TABLET: 7.5; 325 TABLET ORAL at 11:49

## 2022-05-03 RX ADMIN — SUGAMMADEX 400 MG: 100 INJECTION, SOLUTION INTRAVENOUS at 10:05

## 2022-05-03 RX ADMIN — DEXAMETHASONE SODIUM PHOSPHATE 8 MG: 4 INJECTION, SOLUTION INTRAMUSCULAR; INTRAVENOUS at 09:26

## 2022-05-03 RX ADMIN — DEXMEDETOMIDINE HYDROCHLORIDE 4 MCG: 100 INJECTION, SOLUTION INTRAVENOUS at 09:50

## 2022-05-03 RX ADMIN — CLINDAMYCIN PHOSPHATE 900 MG: 900 INJECTION, SOLUTION INTRAVENOUS at 09:02

## 2022-05-03 RX ADMIN — PHENYLEPHRINE HYDROCHLORIDE 100 MCG: 10 INJECTION INTRAVENOUS at 10:04

## 2022-05-03 RX ADMIN — MIDAZOLAM HYDROCHLORIDE 2 MG: 2 INJECTION, SOLUTION INTRAMUSCULAR; INTRAVENOUS at 09:04

## 2022-05-03 RX ADMIN — DEXMEDETOMIDINE HYDROCHLORIDE 4 MCG: 100 INJECTION, SOLUTION INTRAVENOUS at 09:49

## 2022-05-03 RX ADMIN — DEXMEDETOMIDINE HYDROCHLORIDE 4 MCG: 100 INJECTION, SOLUTION INTRAVENOUS at 09:51

## 2022-05-03 RX ADMIN — DEXMEDETOMIDINE HYDROCHLORIDE 4 MCG: 100 INJECTION, SOLUTION INTRAVENOUS at 10:09

## 2022-05-03 RX ADMIN — DEXMEDETOMIDINE HYDROCHLORIDE 4 MCG: 100 INJECTION, SOLUTION INTRAVENOUS at 10:02

## 2022-05-03 RX ADMIN — DEXMEDETOMIDINE HYDROCHLORIDE 4 MCG: 100 INJECTION, SOLUTION INTRAVENOUS at 09:59

## 2022-05-03 RX ADMIN — APREPITANT 40 MG: 40 CAPSULE ORAL at 08:31

## 2022-05-03 RX ADMIN — DEXMEDETOMIDINE HYDROCHLORIDE 4 MCG: 100 INJECTION, SOLUTION INTRAVENOUS at 10:06

## 2022-05-03 ASSESSMENT — PULMONARY FUNCTION TESTS
PIF_VALUE: 28
PIF_VALUE: 1
PIF_VALUE: 30
PIF_VALUE: 29
PIF_VALUE: 2
PIF_VALUE: 1
PIF_VALUE: 30
PIF_VALUE: 33
PIF_VALUE: 4
PIF_VALUE: 23
PIF_VALUE: 30
PIF_VALUE: 34
PIF_VALUE: 36
PIF_VALUE: 31
PIF_VALUE: 29
PIF_VALUE: 29
PIF_VALUE: 4
PIF_VALUE: 28
PIF_VALUE: 30
PIF_VALUE: 31
PIF_VALUE: 29
PIF_VALUE: 29
PIF_VALUE: 1
PIF_VALUE: 30
PIF_VALUE: 24
PIF_VALUE: 25
PIF_VALUE: 33
PIF_VALUE: 30
PIF_VALUE: 29
PIF_VALUE: 28
PIF_VALUE: 30
PIF_VALUE: 1
PIF_VALUE: 30
PIF_VALUE: 28
PIF_VALUE: 20
PIF_VALUE: 31
PIF_VALUE: 16
PIF_VALUE: 3
PIF_VALUE: 30
PIF_VALUE: 29
PIF_VALUE: 36
PIF_VALUE: 6
PIF_VALUE: 1
PIF_VALUE: 30
PIF_VALUE: 29
PIF_VALUE: 29
PIF_VALUE: 30
PIF_VALUE: 28
PIF_VALUE: 21
PIF_VALUE: 30
PIF_VALUE: 5
PIF_VALUE: 31
PIF_VALUE: 28
PIF_VALUE: 25
PIF_VALUE: 29
PIF_VALUE: 30
PIF_VALUE: 28
PIF_VALUE: 35
PIF_VALUE: 30
PIF_VALUE: 23
PIF_VALUE: 28
PIF_VALUE: 39
PIF_VALUE: 30
PIF_VALUE: 30
PIF_VALUE: 8
PIF_VALUE: 31
PIF_VALUE: 2
PIF_VALUE: 30
PIF_VALUE: 37
PIF_VALUE: 28
PIF_VALUE: 30
PIF_VALUE: 28
PIF_VALUE: 29
PIF_VALUE: 19
PIF_VALUE: 20
PIF_VALUE: 1

## 2022-05-03 ASSESSMENT — PAIN SCALES - GENERAL
PAINLEVEL_OUTOF10: 0
PAINLEVEL_OUTOF10: 0
PAINLEVEL_OUTOF10: 5
PAINLEVEL_OUTOF10: 0
PAINLEVEL_OUTOF10: 0
PAINLEVEL_OUTOF10: 5
PAINLEVEL_OUTOF10: 0

## 2022-05-03 ASSESSMENT — PAIN DESCRIPTION - DESCRIPTORS
DESCRIPTORS: ACHING
DESCRIPTORS: ACHING

## 2022-05-03 ASSESSMENT — PAIN DESCRIPTION - ORIENTATION
ORIENTATION: LOWER;MID
ORIENTATION: LOWER;MID

## 2022-05-03 ASSESSMENT — PAIN DESCRIPTION - LOCATION
LOCATION: BACK
LOCATION: BACK

## 2022-05-03 ASSESSMENT — LIFESTYLE VARIABLES: SMOKING_STATUS: 0

## 2022-05-03 NOTE — H&P
Fatuma Bond    [id number]    [facility] Same Day Surgery Update H & P  Department of General Surgery   Surgical Service   Pre-operative History and Physical  Last H & P within the last 30 days. DIAGNOSIS:   Breakdown (mechanical) of implanted electronic neurostimulator, generator, initial encounter ([facility] [address] [T85.113A]  Postlaminectomy syndrome [M96.1]    Procedure(s):  THORACIC LAMINECTOMY WITH REMOVAL OF PADDLE LEAD AND BATTERY    History obtained from: Patient interview and EHR      HISTORY OF PRESENT ILLNESS:   Patient is a morbidly obese (Body mass index is 49.02 kg/m².), 59 y.o. female with SCS placed in 2016. She reports that the SCS does not provide relief and she also needs it removed for MRIs. Illness Screening: Patient denies fever, chills, worsening cough, or close contact with sick individuals.         Past Medical History:        Diagnosis Date    Anxiety     Asthma     Cancer ([facility] [address]     melanoma    Cerebral artery occlusion with cerebral infarction ([facility] [address] 12/2020    GERD (gastroesophageal reflux disease)     Herniated lumbar intervertebral disc     l4    Hyperlipidemia     IBS (irritable bowel syndrome)     Migraine     OA (osteoarthritis)     PFO (patent foramen ovale)     repaired 2021    PONV (postoperative nausea and vomiting)     Pulmonary nodules     Sarcoidosis     Sleep apnea     uses CPAP    UTI (urinary tract infection)     FREQUENT     Past Surgical History:        Procedure Laterality Date    BACK SURGERY      2 DISCECTOMYIES AND LUMBAR FUSION     BREAST LUMPECTOMY      BRONCHOSCOPY N/A 2/15/2022    BRONCHOSCOPY W/EBUS FNA performed by Marcio Abbott DO at [address] [place] [address]  2/15/2022    [address] [facility] [place] [place] ONLY performed by Marcio Abbott DO at [facility]  2021    PFO     CHOLECYSTECTOMY, LAPAROSCOPIC      COLONOSCOPY N/A 4/12/2022    COLONOSCOPY POLYPECTOMY SNARE/COLD BIOPSY performed by Newman Olszewski, MD at [address]    KNEE SURGERY Bilateral     Total of 17 knee surgeries    OTHER SURGICAL HISTORY  6/20/11    lap gastric sleeve    OTHER SURGICAL HISTORY Right 09/01/2017    RIGHT PATELLA REVISION      PAIN MANAGEMENT PROCEDURE Bilateral 8/27/2020    BILATERAL SACROILIAC JOINT INJECTION SITE CONFIRMED BY FLUOROSCOPY performed by Benjamín Jimenez MD at [address] Bilateral 10/22/2020    BILATERAL SACROILIAC JOINT INJECTION SITE CONFIRMED BY FLUOROSCOPY performed by Benjamín Jimenez MD at [address] Left 1/21/2021    LEFT SACRAL ONE EPIDURAL STEROID INJECTION SITE CONFIRMED BY FLUOROSCOPY performed by Benjamín Jimenez MD at [address]  4-16-10    resection of open wound and mass of suprapubic area    SPINAL CORD STIMULATOR SURGERY      TUBAL LIGATION         Medications Prior to Admission:      Prior to Admission medications    Medication Sig Start Date End Date Taking?  Authorizing Provider   ketoconazole (NIZORAL) 2 % cream Apply to rash under breasts bid 4/25/22  Yes Historical Provider, MD   metroNIDAZOLE-Cleanser 0.75 % CREAM KIT Apply topically as needed   Yes Historical Provider, MD   predniSONE (DELTASONE) 10 MG tablet Take 10 mg by mouth daily   Yes Historical Provider, MD   ferrous sulfate (IRON 325) 325 (65 Fe) MG tablet Take 325 mg by mouth daily (with breakfast)    Historical Provider, MD   Plecanatide (TRULANCE) 3 MG TABS Take 3 mg by mouth daily 4/12/22   Newman Olszewski, MD   furosemide (LASIX) 40 MG tablet Take 1 tablet by mouth daily 2/1/22   Dustin Das MD   atorvastatin (LIPITOR) 10 MG tablet Take 10 mg by mouth nightly    Historical Provider, MD   VITAMIN D PO Take 1 tablet by mouth nightly     Historical Provider, MD   clopidogrel (PLAVIX) 75 MG tablet Take 1 tablet by mouth daily  Patient taking differently: Take 75 mg by mouth nightly  3/16/21   Bobby Rice MD   aspirin 81 MG EC tablet Take 1 tablet by mouth daily  Patient taking differently: Take 81 mg by mouth nightly  12/11/20   Janneth Hernandez APRN - CNP   cyclobenzaprine (FLEXERIL) 10 MG tablet Take 10 mg by mouth nightly     Historical Provider, MD   DULoxetine (CYMBALTA) 60 MG extended release capsule Take 60 mg by mouth nightly  7/22/17   Historical Provider, MD   rOPINIRole (REQUIP) 3 MG tablet Take 3 mg by mouth nightly  9/16/15   Historical Provider, MD   montelukast (SINGULAIR) 10 MG tablet Take 10 mg by mouth nightly. Historical Provider, MD   omeprazole (PRILOSEC) 20 MG capsule Take 20 mg by mouth nightly. 4/15/10   Historical Provider, MD   albuterol (PROAIR HFA) 108 (90 BASE) MCG/ACT inhaler Inhale 2 puffs into the lungs every 6 hours as needed. Historical Provider, MD         Allergies:  Hydrocodone-acetaminophen, Morphine, Scopolamine, Adhesive tape, Ampicillin, Nitrofurantoin, Codeine, Percocet [oxycodone-acetaminophen], and Propoxyphene    PHYSICAL EXAM:      BP (!) 146/94   Pulse 106   Temp 96.2 °F (35.7 °C) (Temporal)   Resp 15   Ht 5' 8\" (1.727 m)   Wt (!) 322 lb 6.4 oz (146.2 kg)   SpO2 97%   BMI 49.02 kg/m²      Airway:  Airway patent with no audible stridor    Heart:  Tachycardic, Regular rhythm, No murmur noted    Lungs:  No increased work of breathing, good air exchange, clear to auscultation bilaterally, no crackles or wheezing    Abdomen:  Soft, non-distended, non-tender, no rebound tenderness or guarding, and no masses palpated    ASSESSMENT AND PLAN     Patient is a 59 y.o. female with above specified procedure planned. 1.  The patients history and physical was obtained and signed off by the pre-admission testing department. Patient seen and focused exam done today- no new changes since last physical exam on 4/27/22    2.   Access to ancillary services are available per request of the provider.     KEISHA Patel - AMANDA     5/3/2022

## 2022-05-03 NOTE — PROGRESS NOTES
Zenaida Gandara    Current Allergies: Hydrocodone-acetaminophen, Morphine, Scopolamine, Adhesive tape, Ampicillin, Nitrofurantoin, Codeine, Percocet [oxycodone-acetaminophen], and Propoxyphene    No results for input(s): POCGLU in the last 72 hours. Admitted to PACU bed 10 from OR. Arrived on a stretcher . Attached to PACU monitoring system. Alarms and parameters set. Report received from anesthesia personnel [address] [employer] [place]. OR staff did not report skin issues that were observed while in OR  No problems reported intraoperatively. Pt arrived with oxygen per nasal cannula with oxygen at 3 liters. Athrombic wraps in place. Removed for discharge    Doctors aware of all labs before coming to recovery.

## 2022-05-03 NOTE — PROGRESS NOTES
Patient states that her pain is a 5/10 at surgical site. Spoke with Dr. Francie Wang and received order for norco by mouth as patient is being sent home with a written prescription for norco.  Patient updated on plan of care and agreeable.

## 2022-05-03 NOTE — PROGRESS NOTES
PACU Transfer to [facility]  #4    Procedure(s):  Jacqui Stone    Pt's Current Allergies: Hydrocodone-acetaminophen, Morphine, Scopolamine, Adhesive tape, Ampicillin, Nitrofurantoin, Codeine, Percocet [oxycodone-acetaminophen], and Propoxyphene    Pt meets criteria to transfer to next phase of care per [facility] and [facility] standards    No results for input(s): POCGLU in the last 72 hours. Vitals:    05/03/22 1124   BP: 126/84   Pulse: 89   Resp: 10   Temp: 97.2   SpO2: 100%      Intake/Output Summary (Last 24 hours) at 5/3/2022 1127  Last data filed at 5/3/2022 1115  Gross per 24 hour   Intake 322 ml   Output --   Net 322 ml     Drank diet pepsi  Ate crackers    Pain assessment:  none  Pain Level: 0    Patient was assessed for unknown alterations to skin integrity. There were not unknown alterations observed. Patient transferred to care of Bigg Terrazas RN.  Via handoff sheet  Family updated and directed to [facility] via waiting room staff    5/3/2022 11:27 AM

## 2022-05-03 NOTE — PROGRESS NOTES
Ambulatory Surgery/Procedure Discharge Note    Vitals:    05/03/22 1131   BP: 124/82   Pulse: 88   Resp: 16   Temp: 97.1 °F (36.2 °C)   SpO2: 99%       In: 322 [P.O.:222; I.V.:100]  Out: - 100    Restroom use offered before discharge. yes    Pain assessment:  Pain pill given on discharge, patient stated pain level is tolerable for discharge. Pain Level: 5        Patient discharged to home/self care.  Patient discharged via wheel chair by transporter to waiting family/S.O.       5/3/2022 12:10 PM

## 2022-05-03 NOTE — ANESTHESIA PRE PROCEDURE
Department of Anesthesiology  Preprocedure Note       Name:  Ingrid Valadez   Age:  59 y.o.  :  1960                                          MRN:  [id number]         Date:  5/3/2022      Surgeon: Irais Newberry):  Stevie Bhakta MD    Procedure: Procedure(s):  THORACIC LAMINECTOMY WITH REMOVAL OF PADDLE LEAD AND BATTERY    Medications prior to admission:   Prior to Admission medications    Medication Sig Start Date End Date Taking? Authorizing Provider   ketoconazole (NIZORAL) 2 % cream Apply to rash under breasts bid 22   Historical Provider, MD   metroNIDAZOLE-Cleanser 0.75 % CREAM KIT Apply topically as needed    Historical Provider, MD   predniSONE (DELTASONE) 10 MG tablet Take 10 mg by mouth daily    Historical Provider, MD   ferrous sulfate (IRON 325) 325 (65 Fe) MG tablet Take 325 mg by mouth daily (with breakfast)    Historical Provider, MD   Plecanatide (TRULANCE) 3 MG TABS Take 3 mg by mouth daily 22   Raj Shea MD   furosemide (LASIX) 40 MG tablet Take 1 tablet by mouth daily 22   Tim Almodovar MD   atorvastatin (LIPITOR) 10 MG tablet Take 10 mg by mouth nightly    Historical Provider, MD   VITAMIN D PO Take 1 tablet by mouth nightly     Historical Provider, MD   clopidogrel (PLAVIX) 75 MG tablet Take 1 tablet by mouth daily  Patient taking differently: Take 75 mg by mouth nightly  3/16/21   Tim Almodovar MD   aspirin 81 MG EC tablet Take 1 tablet by mouth daily  Patient taking differently: Take 81 mg by mouth nightly  20   Renetta Serna APRN - CNP   cyclobenzaprine (FLEXERIL) 10 MG tablet Take 10 mg by mouth nightly     Historical Provider, MD   DULoxetine (CYMBALTA) 60 MG extended release capsule Take 60 mg by mouth nightly  17   Historical Provider, MD   rOPINIRole (REQUIP) 3 MG tablet Take 3 mg by mouth nightly  9/16/15   Historical Provider, MD   montelukast (SINGULAIR) 10 MG tablet Take 10 mg by mouth nightly.     Historical Provider, MD   omeprazole ([address] [place] [address]) 20 MG capsule Take 20 mg by mouth nightly. 4/15/10   Historical Provider, MD   albuterol (PROAIR HFA) 108 (90 BASE) MCG/ACT inhaler Inhale 2 puffs into the lungs every 6 hours as needed. Historical Provider, MD       Current medications:    No current facility-administered medications for this visit. No current outpatient medications on file. Facility-Administered Medications Ordered in Other Visits   Medication Dose Route Frequency Provider Last Rate Last Admin    clindamycin (CLEOCIN) 900 mg in dextrose 5 % 50 mL IVPB  900 mg IntraVENous Once Houston Gray MD        lidocaine PF 1 % injection 1 mL  1 mL IntraDERmal Once PRN Alfonso Joy MD        lactated ringers infusion   IntraVENous Continuous Alfonso Joy MD        sodium chloride flush 0.9 % injection 5-40 mL  5-40 mL IntraVENous 2 times per day Alfonso Joy MD        sodium chloride flush 0.9 % injection 5-40 mL  5-40 mL IntraVENous PRN Alfonso Joy MD        0.9 % sodium chloride infusion   IntraVENous PRN Alfonso Joy MD        aprepitant (EMEND) capsule 40 mg  40 mg Oral Once Viji Minaya MD           Allergies:     Allergies   Allergen Reactions    Hydrocodone-Acetaminophen Rash     Slight fever     Morphine Anaphylaxis and Hives    Scopolamine Nausea Only    Adhesive Tape Other (See Comments)     Red skin      Ampicillin Hives, Itching and Nausea Only    Nitrofurantoin     Codeine Itching and Anxiety     \"Goes nuts\"    Percocet [Oxycodone-Acetaminophen] Rash    Propoxyphene Rash       Problem List:    Patient Active Problem List   Diagnosis Code    Surgical wound infection T81.49XA    Asthma J45.909    TALI (obstructive sleep apnea) G47.33    OA (osteoarthritis) of knee M17.10    Morbid obesity due to excess calories (HCC) E66.01    Status post partial gastrectomy Z90.3    Right knee pain M25.561    Low back pain with radiation M54.50    Disc degeneration, lumbar M51.36    Neuropathy of right lower extremity G57.91    Status post total right knee replacement Z96.651    Sacroiliitis, not elsewhere classified ([facility] M46.1    Stroke-like symptoms R29.90    Cerebrovascular accident (CVA) due to embolism of cerebral artery (HCC) I63.40    PFO (patent foramen ovale) Q21.1    Lumbar radiculopathy M54.16    TIA (transient ischemic attack) G45.9    Vision loss of left eye H54.62    Mediastinal adenopathy R59.0    Pulmonary nodules R91.8       Past Medical History:        Diagnosis Date    Anxiety     Asthma     Cancer ([facility]     melanoma    Cerebral artery occlusion with cerebral infarction ([facility] 12/2020    GERD (gastroesophageal reflux disease)     Herniated lumbar intervertebral disc     l4    Hyperlipidemia     IBS (irritable bowel syndrome)     Migraine     OA (osteoarthritis)     PFO (patent foramen ovale)     repaired 2021    PONV (postoperative nausea and vomiting)     Pulmonary nodules     Sarcoidosis     Sleep apnea     uses CPAP    UTI (urinary tract infection)     FREQUENT       Past Surgical History:        Procedure Laterality Date    BACK SURGERY      2 DISCECTOMYIES AND LUMBAR FUSION     BREAST LUMPECTOMY      BRONCHOSCOPY N/A 2/15/2022    BRONCHOSCOPY W/EBUS FNA performed by Puja Archer DO at [address] [place] [address]  2/15/2022    [address] ONLY performed by Puja Archer DO at [facility]  2021    PFO     CHOLECYSTECTOMY, LAPAROSCOPIC      COLONOSCOPY N/A 4/12/2022    COLONOSCOPY POLYPECTOMY SNARE/COLD BIOPSY performed by Krista Schirmer, MD at [address]      JOINT REPLACEMENT      BILAT KNEES    KNEE SURGERY Bilateral     Total of 17 knee surgeries    OTHER SURGICAL HISTORY  6/20/11    lap gastric sleeve    OTHER SURGICAL HISTORY Right 09/01/2017    RIGHT PATELLA REVISION      PAIN MANAGEMENT PROCEDURE Bilateral 8/27/2020 BILATERAL SACROILIAC JOINT INJECTION SITE CONFIRMED BY FLUOROSCOPY performed by Sheri Lal MD at [address] Bilateral 10/22/2020    BILATERAL SACROILIAC JOINT INJECTION SITE CONFIRMED BY FLUOROSCOPY performed by Sheri Lal MD at [address] Left 1/21/2021    LEFT SACRAL ONE EPIDURAL STEROID INJECTION SITE CONFIRMED BY FLUOROSCOPY performed by Sheri Lal MD at [address] SOFT TISSUE TUMOR RESECTION  4-16-10    resection of open wound and mass of suprapubic area   [address]      TUBAL LIGATION         Social History:    Social History     Tobacco Use    Smoking status: Never Smoker    Smokeless tobacco: Never Used   Substance Use Topics    Alcohol use: Not Currently                                Counseling given: Not Answered      Vital Signs (Current): There were no vitals filed for this visit.                                            BP Readings from Last 3 Encounters:   05/03/22 (!) 146/94   04/28/22 125/80   04/12/22 135/88       NPO Status:                                                                                 BMI:   Wt Readings from Last 3 Encounters:   05/03/22 (!) 322 lb 6.4 oz (146.2 kg)   04/28/22 (!) 326 lb 12.8 oz (148.2 kg)   04/12/22 (!) 312 lb (141.5 kg)     There is no height or weight on file to calculate BMI.    CBC:   Lab Results   Component Value Date    WBC 4.8 12/21/2021    RBC 4.70 12/21/2021    HGB 11.2 12/21/2021    HCT 35.0 12/21/2021    MCV 74.3 12/21/2021    RDW 18.1 12/21/2021     12/21/2021       CMP:   Lab Results   Component Value Date     12/21/2021    K 4.1 12/21/2021     12/21/2021    CO2 24 12/21/2021    BUN 7 12/21/2021    CREATININE <0.5 12/21/2021    GFRAA >60 12/21/2021    GFRAA 114 06/07/2011    AGRATIO 1.2 12/21/2021    LABGLOM >60 12/21/2021    GLUCOSE 103 12/21/2021    PROT 6.7 12/21/2021    PROT 7.7 02/11/2010 CALCIUM 9.5 12/21/2021    BILITOT 0.3 12/21/2021    ALKPHOS 95 12/21/2021    AST 12 12/21/2021    ALT 14 12/21/2021       POC Tests: No results for input(s): POCGLU, POCNA, POCK, POCCL, POCBUN, POCHEMO, POCHCT in the last 72 hours. Coags:   Lab Results   Component Value Date    PROTIME 12.1 12/21/2021    INR 1.07 12/21/2021    APTT 35.4 12/21/2021       HCG (If Applicable):   Lab Results   Component Value Date    PREGTESTUR Neg 02/17/2010        ABGs: No results found for: PHART, PO2ART, XVA0AFB, LVK6EOD, BEART, U3WDRXHH     Type & Screen (If Applicable):  Lab Results   Component Value Date    LABABO A 06/07/2011    LABRH Negative 06/07/2011       Drug/Infectious Status (If Applicable):  No results found for: HIV, HEPCAB    COVID-19 Screening (If Applicable):   Lab Results   Component Value Date    COVID19 DETECTED 01/20/2022    COVID19 Not Detected 12/11/2020           Anesthesia Evaluation     history of anesthetic complications: PONV. Airway: Mallampati: IV  TM distance: >3 FB   Neck ROM: full  Mouth opening: > = 3 FB Dental: normal exam     Comment: Fair dentition; denies loose teeth    Pulmonary:   (+) sleep apnea: on CPAP,  decreased breath sounds,  asthma (controlled):     (-) not a current smoker                          ROS comment: CT Chest:  Abnormal mediastinal adenopathy and scattered subcentimeter noncalcified pulmonary nodules raising the question of underlying metastatic disease. Cardiovascular:  Exercise tolerance: no interval change,   (+) hyperlipidemia    (-) hypertension, past MI, CAD, CABG/stent, dysrhythmias,  angina,  CHF, orthopnea and  CHINCHILLA      Rhythm: regular  Rate: normal           Beta Blocker:  Not on Beta Blocker      ROS comment: + PFO, s/p closure device  + Moderate LVH    Echocardiogram:  Summary   Normal left ventricular size. Moderate concentric left ventricular hypertrophy. Normal left ventricular systolic function with an estimated ejection fraction of 65%.  There is a 35 mm pfo closure device that appears well seated. A bubble study was performed and fails to show evidence of shunting. Neuro/Psych:   (+) CVA:, TIA, headaches: migraine headaches, depression/anxiety    (-) seizures            ROS comment: Chronic back pain with lumbar radiculopathy  s/p spinal cord stimulator GI/Hepatic/Renal:   (+) GERD: well controlled, morbid obesity (s/p gastric sleeve)     (-) liver disease and no renal disease       Endo/Other:    (+) blood dyscrasia (DAPT; last Hgb: 11.2): anticoagulation therapy and anemia, arthritis: OA., malignancy/cancer (melanoma). (-) diabetes mellitus, hypothyroidism, hyperthyroidism               Abdominal:         Obesity: Moribund. Vascular:     - DVT and PE. Other Findings:             Anesthesia Plan      general     ASA 3     (I discussed with the patient the risks and benefits of PIV, general anesthesia, IV Narcotics, PACU. All questions were answered the patient agrees with the plan.)  Induction: intravenous. MIPS: Postoperative opioids intended and Prophylactic antiemetics administered. Anesthetic plan and risks discussed with patient. Plan discussed with CRNA. This pre-anesthesia assessment may be used as a history and physical.    DOS STAFF ADDENDUM:    Pt seen and examined, chart reviewed (including anesthesia, drug and allergy history). No interval changes to history and physical examination. Anesthetic plan, risks, benefits, alternatives, and personnel involved discussed with patient. Patient verbalized an understanding and agrees to proceed.       Minal Padilla MD  May 3, 2022  8:11 AM

## 2022-05-03 NOTE — ANESTHESIA POSTPROCEDURE EVALUATION
Department of Anesthesiology  Postprocedure Note    Patient: Sloane Rutherford  MRN: [id number]  [place]: 1960  Date of evaluation: 5/3/2022  Time:  11:28 AM     Procedure Summary     Date: 05/03/22 Room / Location: [facility]    Anesthesia Start: 0900 Anesthesia Stop: 5578    Procedure: THORACIC LAMINECTOMY WITH REMOVAL OF PADDLE LEAD AND BATTERY (N/A Back) Diagnosis:       Breakdown (mechanical) of implanted electronic neurostimulator, generator, initial encounter ([address]      Postlaminectomy syndrome      (Breakdown (mechanical) of implanted electronic neurostimulator, generator, initial encounter ([address] [T85.113A] Postlaminectomy syndrome [M96.1])    Surgeons: Jad Young MD Responsible Provider: Carlos Adan MD    Anesthesia Type: general ASA Status: 3          Anesthesia Type: general    Maya Phase I: Maya Score: 8    Maya Phase II:      Last vitals: Reviewed and per EMR flowsheets.        Anesthesia Post Evaluation    Patient location during evaluation: PACU  Level of consciousness: awake and alert  Airway patency: patent  Nausea & Vomiting: no vomiting  Complications: no  Cardiovascular status: blood pressure returned to baseline  Respiratory status: acceptable  Hydration status: euvolemic  Multimodal analgesia pain management approach

## 2022-05-04 LAB
EKG ATRIAL RATE: 95 BPM
EKG DIAGNOSIS: NORMAL
EKG P AXIS: 58 DEGREES
EKG P-R INTERVAL: 150 MS
EKG Q-T INTERVAL: 362 MS
EKG QRS DURATION: 90 MS
EKG QTC CALCULATION (BAZETT): 454 MS
EKG R AXIS: 50 DEGREES
EKG T AXIS: 52 DEGREES
EKG VENTRICULAR RATE: 95 BPM

## 2022-05-04 PROCEDURE — 93010 ELECTROCARDIOGRAM REPORT: CPT | Performed by: INTERNAL MEDICINE

## 2022-05-04 NOTE — OP NOTE
[facility] [address], [address]                                OPERATIVE REPORT    PATIENT NAME: Alma Kohler                  :        1960  MED REC NO:   [id number]                          ROOM:  ACCOUNT NO:   [de-identified]                           ADMIT DATE: 2022  PROVIDER:     Raymundo Goldberg, MD    DATE OF PROCEDURE:  2022    PREOPERATIVE DIAGNOSES:  Failed back syndrome, chronic back pain and  failed spinal cord stimulator. POSTOPERATIVE DIAGNOSES:  Failed back syndrome, chronic back pain and  failed spinal cord stimulator. OPERATIONS PERFORMED:  Thoracic laminectomy, removal of paddle lead,  removal of left hip battery. OPERATING SURGEON:  Raymundo Goldberg, MD    ANESTHESIA:  General endotracheal.    ESTIMATED BLOOD LOSS:  Less than 10 mL. INDICATION:  The patient is a 59-year-old female with history of chronic  back pain and obesity with a BMI of 49, a history of a spinal cord  stimulator placed a few years ago and she is here for removal due to  ineffective use. The patient understood the procedure risks and  benefits and agreed to proceed in this fashion. PROCEDURE IN DETAIL:  The patient was brought to the operating room. General endotracheal anesthesia was induced. The patient was placed  prone on the Navin frame. The area of the back and left hip were  prepped out and draped in sterile fashion. Previous incisions were  identified, delineated, infiltrated with local anesthetic. Incision  over the left hip was performed initially, carried down to the battery  using PlasmaBlade cautery. Battery was then removed without difficulty. It was a [address] system. Wiring appeared to be intact. No  problems were noted. Wire was cut. Incision was then made in the  thoracic area, carried down to the wiring. Wiring was pulled through to  this incision.   Dissection down to the spine was then performed. Partial laminectomy was then performed. T9 exposure of the tail of the  paddle was then performed. Dissection was then performed to loosen the  paddle and the paddle was then removed from the epidural space without  difficulty. No evidence of CSF leak or dural damage noted. Hemostasis  was then acquired. Both wounds were then irrigated, hemostased and  closed with 2-0 interrupted Vicryl suture for deep layers, three  interrupted 4-0 Monocryl and Dermabond for the skin. No complications  noted. The patient was then flipped on her back, reversed from  anesthesia, extubated, and taken to recovery room in good stable  condition. I certify that I was present and available for the entire procedure.         Alondra Mason MD    D: 05/03/2022 10:40:08       T: 05/03/2022 20:29:57     [email or web address]  Job#: [id number]     Doc#: [id number]    CC:

## 2022-06-01 ENCOUNTER — HOSPITAL ENCOUNTER (OUTPATIENT)
Dept: CT IMAGING | Age: 62
Discharge: HOME | End: 2022-06-01
Payer: COMMERCIAL

## 2022-06-01 DIAGNOSIS — D86.9 SARCOIDOSIS: ICD-10-CM

## 2022-06-01 DIAGNOSIS — R91.8 PULMONARY NODULES: ICD-10-CM

## 2022-06-01 PROCEDURE — 71250 CT THORAX DX C-: CPT

## 2022-07-20 ENCOUNTER — HOSPITAL ENCOUNTER (OUTPATIENT)
Dept: NON INVASIVE DIAGNOSTICS | Age: 62
Discharge: HOME | End: 2022-07-20
Payer: COMMERCIAL

## 2022-07-20 DIAGNOSIS — Q21.12 PFO (PATENT FORAMEN OVALE): ICD-10-CM

## 2022-07-20 LAB
LV EF: 58 %
LVEF MODALITY: NORMAL

## 2022-07-20 PROCEDURE — 6360000004 HC RX CONTRAST MEDICATION: Performed by: INTERNAL MEDICINE

## 2022-07-20 PROCEDURE — C8929 TTE W OR WO FOL WCON,DOPPLER: HCPCS

## 2022-07-20 RX ADMIN — PERFLUTREN 1.5 ML: 6.52 INJECTION, SUSPENSION INTRAVENOUS at 10:40

## 2022-08-26 RX ORDER — FUROSEMIDE 40 MG/1
TABLET ORAL
Qty: 90 TABLET | Refills: 3 | Status: SHIPPED | OUTPATIENT
Start: 2022-08-26

## 2022-09-09 ENCOUNTER — OFFICE VISIT (OUTPATIENT)
Dept: CARDIOLOGY CLINIC | Age: 62
End: 2022-09-09
Payer: COMMERCIAL

## 2022-09-09 VITALS
SYSTOLIC BLOOD PRESSURE: 130 MMHG | HEIGHT: 68 IN | OXYGEN SATURATION: 98 % | HEART RATE: 103 BPM | BODY MASS INDEX: 44.41 KG/M2 | DIASTOLIC BLOOD PRESSURE: 70 MMHG | WEIGHT: 293 LBS

## 2022-09-09 DIAGNOSIS — Q21.12 PFO (PATENT FORAMEN OVALE): Primary | ICD-10-CM

## 2022-09-09 DIAGNOSIS — I89.0 LYMPHEDEMA: ICD-10-CM

## 2022-09-09 PROCEDURE — 99214 OFFICE O/P EST MOD 30 MIN: CPT | Performed by: INTERNAL MEDICINE

## 2022-09-09 RX ORDER — CLOPIDOGREL BISULFATE 75 MG/1
75 TABLET ORAL DAILY
Qty: 90 TABLET | Refills: 1 | Status: SHIPPED | OUTPATIENT
Start: 2022-09-09

## 2022-09-09 NOTE — PROGRESS NOTES
[address]  Cardiology Consult    Saud Elias  1960 September 9, 2022      Reason for Referral: PFO    CC: \"My swelling is not getting better \"      Subjective:     History of Present Illness:    Saud Elias is a 59 y.o. patient with a PMH significant for anxiety,  Asthma, and migraines. She presented to the emergency room on 12/9/2020 with right upper extremity weakness and dysarthria. Today, she is here for follow up. She continues to have swelling despite exercise, elevation and diuretic therapy. She says that when she stopped her Plavix x 7 days she did develop migraines again. Patient denies exertional chest pain/pressure, dyspnea at rest, CH[place]A, PND, orthopnea, palpitations, lightheadedness, weight changes, and syncope. Past Medical History:   has a past medical history of Anxiety, Asthma, Cancer ([address] Cerebral artery occlusion with cerebral infarction ([facility] [address] GERD (gastroesophageal reflux disease), Herniated lumbar intervertebral disc, Hyperlipidemia, IBS (irritable bowel syndrome), Migraine, OA (osteoarthritis), PFO (patent foramen ovale), PONV (postoperative nausea and vomiting), Pulmonary nodules, Sarcoidosis, Sleep apnea, and UTI (urinary tract infection). Surgical History:   has a past surgical history that includes Hysterectomy; Endometrial ablation; Tubal ligation; Cholecystectomy, laparoscopic; joint replacement; Breast lumpectomy; Soft Tissue Tumor Resection (04/16/2010); other surgical history (06/20/2011); other surgical history (Right, 09/01/2017); Pain management procedure (Bilateral, 08/27/2020); Pain management procedure (Bilateral, 10/22/2020); Pain management procedure (Left, 01/21/2021); bronchoscopy (N/A, 02/15/2022); bronchoscopy (02/15/2022); knee surgery (Bilateral); Spinal Cord Stimulator Surgery; Cardiac surgery (2021);  Colonoscopy (N/A, 04/12/2022); back surgery; Pain management procedure (N/A, 05/03/2022); and Spinal cord stimulator removal (04/2022). Social History:   reports that she has never smoked. She has never used smokeless tobacco. She reports that she does not currently use alcohol. She reports that she does not use drugs. Family History:  family history includes Cancer in her mother; Diabetes in her sister; Heart Disease in her father, paternal aunt, and paternal uncle; High Blood Pressure in her brother, father, and sister; Stroke in her father. Home Medications:  Were reviewed and are listed in nursing record and/or below  Prior to Admission medications    Medication Sig Start Date End Date Taking? Authorizing Provider   furosemide (LASIX) 40 MG tablet TAKE 1 TABLET DAILY 8/26/22  Yes Fredrick Humphrey MD   predniSONE (DELTASONE) 10 MG tablet Take 5 mg by mouth daily   Yes Historical Provider, MD   ferrous sulfate (IRON 325) 325 (65 Fe) MG tablet Take 325 mg by mouth daily (with breakfast)   Yes Historical Provider, MD   Plecanatide (TRULANCE) 3 MG TABS Take 3 mg by mouth daily 4/12/22  Yes Dana Glez MD   atorvastatin (LIPITOR) 10 MG tablet Take 10 mg by mouth nightly   Yes Historical Provider, MD   VITAMIN D PO Take 1 tablet by mouth nightly    Yes Historical Provider, MD   cyclobenzaprine (FLEXERIL) 10 MG tablet Take 10 mg by mouth nightly   Yes Historical Provider, MD   DULoxetine (CYMBALTA) 60 MG extended release capsule Take 60 mg by mouth nightly  7/22/17  Yes Historical Provider, MD   rOPINIRole (REQUIP) 3 MG tablet Take 3 mg by mouth nightly  9/16/15  Yes Historical Provider, MD   montelukast (SINGULAIR) 10 MG tablet Take 10 mg by mouth nightly. Yes Historical Provider, MD   omeprazole (PRILOSEC) 20 MG capsule Take 20 mg by mouth nightly. 4/15/10  Yes Historical Provider, MD   albuterol sulfate HFA (PROVENTIL;VENTOLIN;PROAIR) 108 (90 Base) MCG/ACT inhaler Inhale 2 puffs into the lungs every 6 hours as needed.    Yes Historical Provider, MD        CURRENT Medications:  No current facility-administered medications for this visit. Allergies:  Hydrocodone-acetaminophen, Morphine, Scopolamine, Adhesive tape, Ampicillin, Nitrofurantoin, Codeine, Percocet [oxycodone-acetaminophen], and Propoxyphene           Review of Systems: SEE HPI   Constitutional: no unanticipated weight loss. There's been no change in energy level, sleep pattern, or activity level. No fevers, chills. Eyes: No visual changes or diplopia. No scleral icterus. ENT: No Headaches, hearing loss or vertigo. No mouth sores or sore throat. Cardiovascular: No Chest pain, tightness or discomfort. No Shortness of breath. No Dyspnea on exertion, Orthopnea, Paroxysmal nocturnal dyspnea or breathlessness at rest.  No Palpitations. No Syncope ('blackouts', 'faints', 'collapse') or dizziness. Respiratory: No cough or wheezing, no sputum production. No hematemesis. Gastrointestinal: No abdominal pain, appetite loss, blood in stools. No change in bowel or bladder habits. Genitourinary: No dysuria, trouble voiding, or hematuria. Musculoskeletal:  No gait disturbance, no joint complaints. Integumentary: No rash or pruritis. Neurological: No headache, diplopia, change in muscle strength, numbness or tingling. Psychiatric: No anxiety or depression. Endocrine: No temperature intolerance. No excessive thirst, fluid intake, or urination. No tremor. Hematologic/Lymphatic: No abnormal bruising or bleeding, blood clots or swollen lymph nodes. Allergic/Immunologic: No nasal congestion or hives. Objective:     PHYSICAL EXAM:      Vitals:    09/09/22 1603   BP: 130/70   Pulse: (!) 103   SpO2: 98%    Weight: (!) 336 lb 12.8 oz (152.8 kg)       General Appearance:  Alert, cooperative, no distress, appears stated age. Head:  Normocephalic, without obvious abnormality, atraumatic. Eyes:  Pupils equal and round. No scleral icterus. Mouth: Moist mucosa, no pharyngeal erythema. Nose: Nares normal. No drainage or sinus tenderness.    Neck: Supple, symmetrical, trachea midline. No adenopathy. No tenderness/mass/nodules. No carotid bruit or elevated JVD. Lungs:   Respiratory Effort: Normal   Auscultation: Clear to auscultation bilaterally, respirations unlabored. No wheeze, rales   Chest Wall:  No tenderness or deformity. Cardiovascular:    Pulses  Palpation: normal   Ascultation: Regular rate, S1/ S2 normal. No murmur, rub, or gallop. 2+ radial and pedal pulses, symmetric  Carotid  Femoral   Abdomen and Gastrointestinal:   Soft, non-tender, bowel sounds active. Liver and Spleen  Masses   Musculoskeletal: No muscle wasting  Back  Gait   Extremities: Extremities normal, atraumatic. No cyanosis or edema. No cyanosis clubbing       Skin: Inspection and palpation performed, no rashes or lesions. Pysch: Normal mood and affect.  Alert and oriented to time place person   Neurologic: Normal gross motor and sensory exam.       Labs     All labs have been reviewed    Lab Results   Component Value Date/Time    WBC 4.8 12/21/2021 06:11 AM    RBC 4.70 12/21/2021 06:11 AM    HGB 11.2 12/21/2021 06:11 AM    HCT 35.0 12/21/2021 06:11 AM    MCV 74.3 12/21/2021 06:11 AM    RDW 18.1 12/21/2021 06:11 AM     12/21/2021 06:11 AM     Lab Results   Component Value Date/Time     12/21/2021 06:11 AM    K 4.1 12/21/2021 06:11 AM     12/21/2021 06:11 AM    CO2 24 12/21/2021 06:11 AM    BUN 7 12/21/2021 06:11 AM    CREATININE <0.5 12/21/2021 06:11 AM    GFRAA >60 12/21/2021 06:11 AM    GFRAA 114 06/07/2011 08:55 AM    AGRATIO 1.2 12/21/2021 06:11 AM    LABGLOM >60 12/21/2021 06:11 AM    GLUCOSE 103 12/21/2021 06:11 AM    PROT 6.7 12/21/2021 06:11 AM    PROT 7.7 02/11/2010 01:30 PM    CALCIUM 9.5 12/21/2021 06:11 AM    BILITOT 0.3 12/21/2021 06:11 AM    ALKPHOS 95 12/21/2021 06:11 AM    AST 12 12/21/2021 06:11 AM    ALT 14 12/21/2021 06:11 AM     No results found for: PTINR  Lab Results   Component Value Date    LABA1C 5.7 12/21/2021     Lab Results   Component Value Date    TROPONINI <0.01 12/10/2020       Cardiac, Vascular and Imaging Data all Personally Reviewed in Detail by Myself      EKG:     Echocardiogram:   ECHO 12/10/2020  A bubble study was performed and shows evidence of right to left shunting consistent with a patent foramen ovale or atrial septal defect. Normal LV size & wall motion; EF   60 %. Grade I diastolic dysfunction. The left atrium is at the upper limits of normal in size. Mild mitral regurgitation. ECHO (limited) 12/2/2021  Normal left ventricular size. Moderate concentric left ventricular  hypertrophy. Normal left ventricular systolic function with an estimated ejection  fraction of 65%. There is a 35 mm pfo closure device that appears well seated. A bubble study was performed and fails to show evidence of shunting. Stress Test:     Cath: Other imaging:   MITALI 12/17/2020  Ejection fraction is visually estimated to be 55-60%. Patent foramen ovale was visualized. Positive bubble study with right to left atria level shunt seen. Assessment and Plan     PFO  TIA per neurology when she was seen on 12/10/2020. S/p PFO closure 12/2/2021. Continue Plavix and statin therapy. TIA  With motor and sensory deficits of the right upper and lower extremity. Continue Plavix and statin therapy. Bilateral lower extremity edema  Lymphedema bilateral lower extremity ~3 years. Unable to tolerate compression stockings due to allergy. Encouraged elevation as much as possible.  Will place referral for flexitouch  DIAGNOSIS ASSESSMENT:  Lymphedema is caused by:  Lymphedema, not elsewhere m classified [I89.0] (includes CVI-related lymphedema) [] Yes [] No   Hereditary lymphedema [Q82.0] [] Yes [] No   Chronic Venous Stasis ulcers [I87.2] (non-healing despite 6 months of ongoing treatment)  [] Yes [x] No      SWELLING SEVERITY:   Patient exhibits the following swelling severity (International Society of Lymphology clinical classification):  [] Stage 0: Latent or subclinical condition where swelling is not yet evident despite impaired lymph transport, subtle alterations in tissue fluid/composition and changes in subjective symptoms. It may exist months or years before overt edema occurs (Stages I - III). [] Stage I: Early accumulation of fluid relatively high in protein content (e.g., in comparison with ?enous edema) which subsides with limb elevation. Pitting may occur. An increase in various types of proliferating cells may also be seen. [x] Stage II: Limb elevation alone rarely reduces the tissue swelling and pitting is manifest.  Later in Stage II, the limb may not pit as excess subcutaneous fat and fibrosis develop. [] Stage III: Lymphostatic elephantiasis where pitting can be absent and trophic skin changes such as acanthosis, alterations in skin character and thickness, further deposition of fat and fibrosis, and warty overgrowths have developed. SYMPTOMS DESPITE CONSERVATIVE THERAPY:  [] Hyperkeratosis  [] Hyperplasia  [] Hyperpigmentation  [] Skin breakdown with lymphorrhea (skin weeping)  [] Papillomatosis  [] Recurrent cellulitis  [] Fibrosis   [] Elephantiasis  [x] Progressive edema  [] Truncal/abdominal swelling  [] Chest/axillary swelling  [] Genital swelling  [x] Unable to control swelling  [] Impaired ROM  [x] Impaired mobility  [x] Pain    CONSERVATIVE TREATMENT:  Patient has tried conservative treatments (compression/exercise/elevation):  [x] Yes [] No    Instructed on self-manual lymphatic drainage techniques (self-MLD): [x] Yes [] No  Instructed on appropriate skin/nail care practices: [x] Yes [] No  Compression garments of at least 20-30mmHg: [x] <4 weeks   [] 1-5 months   [] 6-12 months  [] >1 year  Bandaging: [] <4 weeks   [] 1-5 months   [] 6-12 months  [] >1 year  Elevation:   [] <4 weeks   [] 1-5 months   [x] 6-12 months  [] >1 year  Exercise:     [] <4 weeks   [x] 1-5 months   [] 6-12 months  [] >1 year      Follow up in 6 months.      Thank you for

## 2022-09-23 ENCOUNTER — TELEPHONE (OUTPATIENT)
Dept: CARDIOLOGY CLINIC | Age: 62
End: 2022-09-23

## 2022-11-07 ENCOUNTER — OFFICE VISIT (OUTPATIENT)
Dept: PULMONOLOGY | Age: 62
End: 2022-11-07
Payer: COMMERCIAL

## 2022-11-07 VITALS
RESPIRATION RATE: 21 BRPM | SYSTOLIC BLOOD PRESSURE: 125 MMHG | TEMPERATURE: 97.3 F | HEIGHT: 68 IN | HEART RATE: 94 BPM | DIASTOLIC BLOOD PRESSURE: 85 MMHG | BODY MASS INDEX: 44.41 KG/M2 | OXYGEN SATURATION: 98 % | WEIGHT: 293 LBS

## 2022-11-07 DIAGNOSIS — Z99.89 OSA ON CPAP: Primary | ICD-10-CM

## 2022-11-07 DIAGNOSIS — G47.33 OSA ON CPAP: Primary | ICD-10-CM

## 2022-11-07 DIAGNOSIS — D86.9 SARCOIDOSIS: ICD-10-CM

## 2022-11-07 DIAGNOSIS — R91.8 PULMONARY NODULES: ICD-10-CM

## 2022-11-07 PROCEDURE — 99214 OFFICE O/P EST MOD 30 MIN: CPT | Performed by: INTERNAL MEDICINE

## 2022-11-07 ASSESSMENT — SLEEP AND FATIGUE QUESTIONNAIRES
HOW LIKELY ARE YOU TO NOD OFF OR FALL ASLEEP WHILE WATCHING TV: 2
HOW LIKELY ARE YOU TO NOD OFF OR FALL ASLEEP WHILE SITTING QUIETLY AFTER LUNCH WITHOUT ALCOHOL: 0
HOW LIKELY ARE YOU TO NOD OFF OR FALL ASLEEP WHILE SITTING AND READING: 1
HOW LIKELY ARE YOU TO NOD OFF OR FALL ASLEEP WHILE SITTING INACTIVE IN A PUBLIC PLACE: 0
HOW LIKELY ARE YOU TO NOD OFF OR FALL ASLEEP WHILE LYING DOWN TO REST IN THE AFTERNOON WHEN CIRCUMSTANCES PERMIT: 1
HOW LIKELY ARE YOU TO NOD OFF OR FALL ASLEEP WHEN YOU ARE A PASSENGER IN A CAR FOR AN HOUR WITHOUT A BREAK: 2
HOW LIKELY ARE YOU TO NOD OFF OR FALL ASLEEP WHILE SITTING AND TALKING TO SOMEONE: 0
ESS TOTAL SCORE: 6
HOW LIKELY ARE YOU TO NOD OFF OR FALL ASLEEP IN A CAR, WHILE STOPPED FOR A FEW MINUTES IN TRAFFIC: 0

## 2022-11-07 NOTE — PROGRESS NOTES
Chief complaint  This is a 59y.o. year old female  who comes to see me with a chief complaint of   Chief Complaint   Patient presents with    Follow-up     6 months TALI         HPI  Here with a cc of sarcoidosis and TALI follow up     Sarcoidosis:   Still dealing with visual issues and now has a macular hole. Still dealing with swelling in legs, hand is numb and other issues such as cough and SOB. Walked a long distance downtown up a hill and was really SOB. She did not have albuterol with her. Has used it from time to time with some help. Her back issue limit her walking on flat distances. She is going to [facility] [place]note 2[place] to see someone for sarcoidosis in December. Remains on 5 mg of prednisone from Ophthalmologist     Machine:  Patient is using an APAP machine. Average usage is 7 hrs 38 min 00 sec. She is meeting 4 or more hours per night 99% of the time.   Average AHI is 0.3        Current Outpatient Medications:     clopidogrel (PLAVIX) 75 MG tablet, Take 1 tablet by mouth daily, Disp: 90 tablet, Rfl: 1    furosemide (LASIX) 40 MG tablet, TAKE 1 TABLET DAILY, Disp: 90 tablet, Rfl: 3    predniSONE (DELTASONE) 10 MG tablet, Take 5 mg by mouth daily, Disp: , Rfl:     ferrous sulfate (IRON 325) 325 (65 Fe) MG tablet, Take 325 mg by mouth daily (with breakfast), Disp: , Rfl:     Plecanatide (TRULANCE) 3 MG TABS, Take 3 mg by mouth daily, Disp: 90 tablet, Rfl: 5    atorvastatin (LIPITOR) 10 MG tablet, Take 10 mg by mouth nightly, Disp: , Rfl:     VITAMIN D PO, Take 1 tablet by mouth nightly , Disp: , Rfl:     cyclobenzaprine (FLEXERIL) 10 MG tablet, Take 10 mg by mouth nightly, Disp: , Rfl:     DULoxetine (CYMBALTA) 60 MG extended release capsule, Take 60 mg by mouth nightly , Disp: , Rfl:     rOPINIRole (REQUIP) 3 MG tablet, Take 3 mg by mouth nightly , Disp: , Rfl:     montelukast (SINGULAIR) 10 MG tablet, Take 10 mg by mouth nightly., Disp: , Rfl:     omeprazole (PRILOSEC) 20 MG capsule, Take 20 mg by mouth nightly., Disp: , Rfl:     albuterol sulfate HFA (PROVENTIL;VENTOLIN;PROAIR) 108 (90 Base) MCG/ACT inhaler, Inhale 2 puffs into the lungs every 6 hours as needed. , Disp: , Rfl:       PHYSICAL EXAM:  Vitals:    11/07/22 1023   BP: 125/85   Pulse: 94   Resp: 21   Temp: 97.3 °F (36.3 °C)   SpO2: 98%         GENERAL:  Well nourished, alert, appears stated age, no distress  HEENT:  No scleral icterus, no conjunctival irritation  NECK:  No thyromegaly, no bruits  LYMPH:  No cervical or supraclavicular adenopathy  HEART:  Tachycardic, no murmurs  LUNGS:  Clear bilaterally   ABDOMEN:  No distention, no organomegaly  EXTREMITIES:  + edema, no digital clubbing  NEURO:  No localizing deficits, CN II-XII intact    Pulmonary Function Testing 3/22  My impression:  Isolated mildly reduced diffusing capacity with bronchodilator response in mid-flows     Chest imaging from 6/22 is reviewed. My interpretation is mediastinal and hilar adenopathy which seem smaller. Mosaicism     EBUS 2/2022  A. 11L lymph node, endobronchial ultrasound guided fine needle        aspiration:      - Scant small lymphocytes, non-necrotizing granulomatous inflammation        and fibrosis      - No malignant cells identified      - GMS special staining is pending and will be reported in an addendum        B. 7 lymph node, endobronchial ultrasound guided fine needle        aspiration:      - Hypocellular specimen      - Predominantly blood, cartilage, and benign-appearing epithelial        cells     ECHO 7/22  Left ventricular cavity size is normal. There is mildly increased left   ventricular wall thickness. Ejection fraction is visually estimated to be   55-60%. The left atrial size is normal.   Septal occluder device well positioned   A bubble study was performed and fails to show evidence of shunting. I reviewed above labs and studies pertinent to this visit and date    Assessment/Plan:  1. TALI on CPAP  Benefiting and compliant.   Benefiting from therapy by a low [place] score, good energy levels, low fatigue, and control of chronic medical problems    2. Sarcoidosis  Adenopathy in chest is shrinking, smaller to stable nodules with some mosaicism. Some response to albuterol. Does not want to start ICS inhaler due to upcoming appointment. Not sure respiratory issues are related to sarcoidosis. Did not seem to improve with prednisone and had more ocular issues upon presentation to the hospital. Neuro sarcoid was ruled out previously when admitted. Hard to know if any of her symptoms are related to sarcoidosis or not. Screening ECHO WNL. Can repeat PFT in March to compare to previous ones. 3. Pulmonary nodules  Smaller or stable. Likely related to sarcoidosis but more lower lobe predominant than upper lobe. Regardless low risk for malignant potential.      Going to [facility] for Sarcoid opinion.       Follow up in 6 months

## 2023-03-15 RX ORDER — CLOPIDOGREL BISULFATE 75 MG/1
TABLET ORAL
Qty: 90 TABLET | Refills: 3 | Status: SHIPPED | OUTPATIENT
Start: 2023-03-15

## 2023-05-17 ENCOUNTER — OFFICE VISIT (OUTPATIENT)
Dept: PULMONOLOGY | Age: 63
End: 2023-05-17
Payer: COMMERCIAL

## 2023-05-17 VITALS
RESPIRATION RATE: 18 BRPM | WEIGHT: 293 LBS | BODY MASS INDEX: 44.41 KG/M2 | DIASTOLIC BLOOD PRESSURE: 76 MMHG | HEART RATE: 108 BPM | HEIGHT: 68 IN | TEMPERATURE: 97.1 F | OXYGEN SATURATION: 97 % | SYSTOLIC BLOOD PRESSURE: 126 MMHG

## 2023-05-17 DIAGNOSIS — G47.33 OSA ON CPAP: Primary | ICD-10-CM

## 2023-05-17 DIAGNOSIS — D86.9 SARCOIDOSIS: ICD-10-CM

## 2023-05-17 DIAGNOSIS — Z99.89 OSA ON CPAP: Primary | ICD-10-CM

## 2023-05-17 PROCEDURE — 99213 OFFICE O/P EST LOW 20 MIN: CPT | Performed by: INTERNAL MEDICINE

## 2023-05-17 ASSESSMENT — SLEEP AND FATIGUE QUESTIONNAIRES
HOW LIKELY ARE YOU TO NOD OFF OR FALL ASLEEP IN A CAR, WHILE STOPPED FOR A FEW MINUTES IN TRAFFIC: 1
HOW LIKELY ARE YOU TO NOD OFF OR FALL ASLEEP WHILE SITTING INACTIVE IN A PUBLIC PLACE: 0
ESS TOTAL SCORE: 7
HOW LIKELY ARE YOU TO NOD OFF OR FALL ASLEEP WHILE WATCHING TV: 1
HOW LIKELY ARE YOU TO NOD OFF OR FALL ASLEEP WHILE SITTING AND READING: 1
HOW LIKELY ARE YOU TO NOD OFF OR FALL ASLEEP WHEN YOU ARE A PASSENGER IN A CAR FOR AN HOUR WITHOUT A BREAK: 2
HOW LIKELY ARE YOU TO NOD OFF OR FALL ASLEEP WHILE LYING DOWN TO REST IN THE AFTERNOON WHEN CIRCUMSTANCES PERMIT: 1
HOW LIKELY ARE YOU TO NOD OFF OR FALL ASLEEP WHILE SITTING AND TALKING TO SOMEONE: 0
HOW LIKELY ARE YOU TO NOD OFF OR FALL ASLEEP WHILE SITTING QUIETLY AFTER LUNCH WITHOUT ALCOHOL: 1

## 2023-05-17 NOTE — PROGRESS NOTES
Chief complaint  This is a 57y.o. year old female  who comes to see me with a chief complaint of   Chief Complaint   Patient presents with    Follow-up    Sleep Apnea         HPI  Here with a cc of TALI and sarcoidosis     Sarcoidosis:   Onesimo Landau to [facility] [place] earlier in the year for eval by Pulm, Neuro, Ophth etc.  She had PET and other testing. PET showed no active disease. Most of her sarcoid issues were related to vision. She had been on prednisone but now on methotrexate. She is off prednisone. Does not appear to have active disease at this time. She is relieved about the testing etc.  She still has aches and pains and difficulty in getting around. Machine:  Patient is using an APAP machine. Average usage is 7 hrs 19 min 00 sec. She is meeting 4 or more hours per night 99% of the time.   Average AHI is 0.5        Current Outpatient Medications:     clopidogrel (PLAVIX) 75 MG tablet, TAKE 1 TABLET DAILY, Disp: 90 tablet, Rfl: 3    furosemide (LASIX) 40 MG tablet, TAKE 1 TABLET DAILY, Disp: 90 tablet, Rfl: 3    Plecanatide (TRULANCE) 3 MG TABS, Take 3 mg by mouth daily, Disp: 90 tablet, Rfl: 5    atorvastatin (LIPITOR) 10 MG tablet, Take 1 tablet by mouth nightly, Disp: , Rfl:     cyclobenzaprine (FLEXERIL) 10 MG tablet, Take 1 tablet by mouth nightly, Disp: , Rfl:     DULoxetine (CYMBALTA) 60 MG extended release capsule, Take 1 capsule by mouth nightly, Disp: , Rfl:     rOPINIRole (REQUIP) 3 MG tablet, Take 1 tablet by mouth nightly, Disp: , Rfl:     montelukast (SINGULAIR) 10 MG tablet, Take 1 tablet by mouth nightly, Disp: , Rfl:     omeprazole (PRILOSEC) 20 MG capsule, Take 1 capsule by mouth nightly, Disp: , Rfl:     albuterol sulfate HFA (PROVENTIL;VENTOLIN;PROAIR) 108 (90 Base) MCG/ACT inhaler, Inhale 2 puffs into the lungs every 6 hours as needed, Disp: , Rfl:     ferrous sulfate (IRON 325) 325 (65 Fe) MG tablet, Take 325 mg by mouth daily (with breakfast), Disp: , Rfl:     VITAMIN D

## 2023-10-17 ENCOUNTER — TELEPHONE (OUTPATIENT)
Dept: CARDIOLOGY CLINIC | Age: 63
End: 2023-10-17

## 2023-10-17 NOTE — TELEPHONE ENCOUNTER
CARDIAC CLEARANCE     Procedure:  Lumbar Intralaminar Epidural Steroid Injection  :  Dr. Jorge Stearns  Date:  TBD    Medications needing held:  Plavix      How long?:  7 days prior    Phone Number and Contact Name for Physicians office:  Sabrina Mancera [phone]  Fax number to send information:  [phone] or [phone]    Clinical staff:    Cardiologist:  Dr. Gail Conrad  Last Appointment:  9/9/2022  Next Appointment:  None  Cardiac History: PMH significant for anxiety,  Asthma, and migraines. Scanned CC to PT's chart.

## 2023-10-17 NOTE — TELEPHONE ENCOUNTER
Dr. Abdoulaye Lazcano,     Please advise if patient may proceed with Lumbar Intralaminar Epidural Steroid Injection and hold Plavix for 7 days prior. She was last seen in office on 9/9/2022. PFO closure 12/2021. No follow up scheduled at this time.

## 2023-11-27 ENCOUNTER — OFFICE VISIT (OUTPATIENT)
Dept: PULMONOLOGY | Age: 63
End: 2023-11-27
Payer: COMMERCIAL

## 2023-11-27 VITALS
DIASTOLIC BLOOD PRESSURE: 72 MMHG | RESPIRATION RATE: 18 BRPM | SYSTOLIC BLOOD PRESSURE: 130 MMHG | WEIGHT: 293 LBS | TEMPERATURE: 97.8 F | BODY MASS INDEX: 44.41 KG/M2 | OXYGEN SATURATION: 98 % | HEIGHT: 68 IN | HEART RATE: 98 BPM

## 2023-11-27 DIAGNOSIS — G47.33 OSA ON CPAP: Primary | ICD-10-CM

## 2023-11-27 DIAGNOSIS — J32.9 RHINOSINUSITIS: ICD-10-CM

## 2023-11-27 DIAGNOSIS — D86.9 SARCOIDOSIS: ICD-10-CM

## 2023-11-27 PROCEDURE — 99214 OFFICE O/P EST MOD 30 MIN: CPT | Performed by: INTERNAL MEDICINE

## 2023-11-27 PROCEDURE — 1036F TOBACCO NON-USER: CPT | Performed by: INTERNAL MEDICINE

## 2023-11-27 PROCEDURE — G8427 DOCREV CUR MEDS BY ELIG CLIN: HCPCS | Performed by: INTERNAL MEDICINE

## 2023-11-27 PROCEDURE — G8484 FLU IMMUNIZE NO ADMIN: HCPCS | Performed by: INTERNAL MEDICINE

## 2023-11-27 PROCEDURE — 3017F COLORECTAL CA SCREEN DOC REV: CPT | Performed by: INTERNAL MEDICINE

## 2023-11-27 PROCEDURE — G8417 CALC BMI ABV UP PARAM F/U: HCPCS | Performed by: INTERNAL MEDICINE

## 2023-11-27 RX ORDER — TRIAMCINOLONE ACETONIDE 55 UG/1
2 SPRAY, METERED NASAL DAILY
COMMUNITY
End: 2023-11-27 | Stop reason: CLARIF

## 2023-11-27 RX ORDER — AZELASTINE 1 MG/ML
2 SPRAY, METERED NASAL 2 TIMES DAILY
Qty: 1 EACH | Refills: 5 | Status: SHIPPED | OUTPATIENT
Start: 2023-11-27

## 2023-11-27 ASSESSMENT — SLEEP AND FATIGUE QUESTIONNAIRES
ESS TOTAL SCORE: 7
HOW LIKELY ARE YOU TO NOD OFF OR FALL ASLEEP WHILE WATCHING TV: 1
HOW LIKELY ARE YOU TO NOD OFF OR FALL ASLEEP WHILE SITTING QUIETLY AFTER LUNCH WITHOUT ALCOHOL: 0
HOW LIKELY ARE YOU TO NOD OFF OR FALL ASLEEP WHILE SITTING AND TALKING TO SOMEONE: 0
HOW LIKELY ARE YOU TO NOD OFF OR FALL ASLEEP WHILE SITTING AND READING: 2
HOW LIKELY ARE YOU TO NOD OFF OR FALL ASLEEP WHEN YOU ARE A PASSENGER IN A CAR FOR AN HOUR WITHOUT A BREAK: 2
HOW LIKELY ARE YOU TO NOD OFF OR FALL ASLEEP WHILE SITTING INACTIVE IN A PUBLIC PLACE: 1
HOW LIKELY ARE YOU TO NOD OFF OR FALL ASLEEP IN A CAR, WHILE STOPPED FOR A FEW MINUTES IN TRAFFIC: 0
HOW LIKELY ARE YOU TO NOD OFF OR FALL ASLEEP WHILE LYING DOWN TO REST IN THE AFTERNOON WHEN CIRCUMSTANCES PERMIT: 1

## 2023-11-27 NOTE — PROGRESS NOTES
Chief complaint  This is a 57y.o. year old female  who comes to see me with a chief complaint of   Chief Complaint   Patient presents with    Follow-up    Sleep Apnea         HPI  Here with a cc of TALI      Machine:  Patient is using an APAP machine. Average usage is 7 hrs 6 min 00 sec. She is meeting 4 or more hours per night 97% of the time. Average AHI is 0.5    Having a lot of coughing first thing in the am.  Does not happened the rest of the day. Sinuses feel congested. On nasonex with little help    Still on methotrexate for sarcoidosis. Following with [facility] for that.   May only take methotrexate for 2 years then will wean off         Current Outpatient Medications:     azelastine (ASTELIN) 0.1 % nasal spray, 2 sprays by Nasal route 2 times daily Use in each nostril as directed, Disp: 1 each, Rfl: 5    clopidogrel (PLAVIX) 75 MG tablet, TAKE 1 TABLET DAILY, Disp: 90 tablet, Rfl: 3    furosemide (LASIX) 40 MG tablet, TAKE 1 TABLET DAILY, Disp: 90 tablet, Rfl: 3    Plecanatide (TRULANCE) 3 MG TABS, Take 3 mg by mouth daily, Disp: 90 tablet, Rfl: 5    atorvastatin (LIPITOR) 10 MG tablet, Take 1 tablet by mouth nightly, Disp: , Rfl:     cyclobenzaprine (FLEXERIL) 10 MG tablet, Take 1 tablet by mouth nightly, Disp: , Rfl:     DULoxetine (CYMBALTA) 60 MG extended release capsule, Take 1 capsule by mouth nightly, Disp: , Rfl:     rOPINIRole (REQUIP) 3 MG tablet, Take 1 tablet by mouth nightly, Disp: , Rfl:     montelukast (SINGULAIR) 10 MG tablet, Take 1 tablet by mouth nightly, Disp: , Rfl:     omeprazole (PRILOSEC) 20 MG capsule, Take 1 capsule by mouth nightly, Disp: , Rfl:     albuterol sulfate HFA (PROVENTIL;VENTOLIN;PROAIR) 108 (90 Base) MCG/ACT inhaler, Inhale 2 puffs into the lungs every 6 hours as needed, Disp: , Rfl:     ferrous sulfate (IRON 325) 325 (65 Fe) MG tablet, Take 325 mg by mouth daily (with breakfast), Disp: , Rfl:       PHYSICAL EXAM:  Vitals:    11/27/23 0956   BP: 130/72

## 2024-04-24 ENCOUNTER — TELEPHONE (OUTPATIENT)
Dept: CARDIOLOGY CLINIC | Age: 64
End: 2024-04-24

## 2024-04-24 NOTE — TELEPHONE ENCOUNTER
Dr. Peterson,   Please advise on the below preop cardio risk assess and request to hold Plavix therapy prior for 7 days?     Also, girls will get her scheduled for overdue follow up. Post PFO closure 12/2021. Last f/u 9/2022.

## 2024-04-24 NOTE — TELEPHONE ENCOUNTER
CARDIAC CLEARANCE / [facility] Scanned to patient chart .    Procedure:Epidural Steriod Injection   :Dr. Marlon De Souza   Date:TBD    Medications needing held:Plavix/ 7Days         Phone Number and Contact Name for Physicians office:  Fax number to send information:[phone]    Clinical staff:    Cardiologist:Dr. Peterson   Last Appointment:9/9/2022     Next Appointment:Can this patient be placed on Dr. Peterson Schedule /Follow up in 6 months.     Cardiac History: Marlyn Mas is a 61 y.o. patient with a PMH significant for anxiety,  Asthma, and migraines. She was last seen in office on 9/9/2022. PFO closure

## 2024-04-25 NOTE — TELEPHONE ENCOUNTER
Discussed with Dr Peterson, okay to proceed with procedure and hold Plavix. Please prepare letter an make patient aware. She is overdue for appointment.

## 2024-07-16 ENCOUNTER — OFFICE VISIT (OUTPATIENT)
Dept: PULMONOLOGY | Age: 64
End: 2024-07-16
Payer: COMMERCIAL

## 2024-07-16 VITALS
OXYGEN SATURATION: 96 % | DIASTOLIC BLOOD PRESSURE: 73 MMHG | HEART RATE: 114 BPM | RESPIRATION RATE: 18 BRPM | SYSTOLIC BLOOD PRESSURE: 134 MMHG | HEIGHT: 68 IN | TEMPERATURE: 97.2 F | BODY MASS INDEX: 39.8 KG/M2 | WEIGHT: 262.6 LBS

## 2024-07-16 DIAGNOSIS — J32.9 RHINOSINUSITIS: ICD-10-CM

## 2024-07-16 DIAGNOSIS — G47.33 OSA ON CPAP: Primary | ICD-10-CM

## 2024-07-16 DIAGNOSIS — D86.9 SARCOIDOSIS: ICD-10-CM

## 2024-07-16 PROCEDURE — 1124F ACP DISCUSS-NO DSCNMKR DOCD: CPT | Performed by: INTERNAL MEDICINE

## 2024-07-16 PROCEDURE — 99214 OFFICE O/P EST MOD 30 MIN: CPT | Performed by: INTERNAL MEDICINE

## 2024-07-16 ASSESSMENT — COPD QUESTIONNAIRES
QUESTION2_CHESTPHLEGM: 0
QUESTION1_COUGHFREQUENCY: 4
QUESTION5_HOMEACTIVITIES: 4
QUESTION7_SLEEPQUALITY: 4
QUESTION3_CHESTTIGHTNESS: 3
CAT_TOTALSCORE: 25
QUESTION8_ENERGYLEVEL: 4
QUESTION4_WALKINCLINE: 5
QUESTION6_LEAVINGHOUSE: 1

## 2024-07-16 NOTE — PROGRESS NOTES
Chief complaint  This is a 63 y.o. year old female  who comes to see me with a chief complaint of   Chief Complaint   Patient presents with    COPD    Sleep Apnea         HPI  Here with a cc of TALI      Machine:  Patient is using an APAP machine.  Average usage is 6 hrs 44 min 00 sec.  She is meeting 4 or more hours per night 100% of the time.  Average AHI is 0.7.  Her sleeping is not the greatest but it has been like that for years    Not using nor needing albuterol much    Stopped using nasonex due to thrush    Still on methotrexate for sarcoidosis.  Sinuses still are bothersome.  Ear is itching    Has lost about 60 lbs since last visit.  She is trying to lose weight for upcoming back surgery           Current Outpatient Medications:     azelastine (ASTELIN) 0.1 % nasal spray, 2 sprays by Nasal route 2 times daily Use in each nostril as directed, Disp: 1 each, Rfl: 5    clopidogrel (PLAVIX) 75 MG tablet, TAKE 1 TABLET DAILY, Disp: 90 tablet, Rfl: 3    furosemide (LASIX) 40 MG tablet, TAKE 1 TABLET DAILY, Disp: 90 tablet, Rfl: 3    atorvastatin (LIPITOR) 10 MG tablet, Take 1 tablet by mouth nightly, Disp: , Rfl:     cyclobenzaprine (FLEXERIL) 10 MG tablet, Take 1 tablet by mouth nightly, Disp: , Rfl:     DULoxetine (CYMBALTA) 60 MG extended release capsule, Take 1 capsule by mouth nightly, Disp: , Rfl:     rOPINIRole (REQUIP) 3 MG tablet, Take 1 tablet by mouth nightly, Disp: , Rfl:     montelukast (SINGULAIR) 10 MG tablet, Take 1 tablet by mouth nightly, Disp: , Rfl:     omeprazole (PRILOSEC) 20 MG capsule, Take 1 capsule by mouth nightly, Disp: , Rfl:     albuterol sulfate HFA (PROVENTIL;VENTOLIN;PROAIR) 108 (90 Base) MCG/ACT inhaler, Inhale 2 puffs into the lungs every 6 hours as needed, Disp: , Rfl:     Plecanatide (TRULANCE) 3 MG TABS, Take 3 mg by mouth daily, Disp: 90 tablet, Rfl: 5      PHYSICAL EXAM:  Vitals:    07/16/24 0811   BP: 134/73   Pulse: (!) 114   Resp: 18   Temp: 97.2 °F (36.2 °C)   SpO2:

## 2024-07-16 NOTE — PATIENT INSTRUCTIONS
Use albuterol when needed    Continue with singulair     Use astelin as needed    Try using zyrtec or claritin pills for allergies    Recommend neti pot or sinus rinse     Follow up in 6 months

## 2025-01-16 ENCOUNTER — OFFICE VISIT (OUTPATIENT)
Dept: PULMONOLOGY | Age: 65
End: 2025-01-16
Payer: COMMERCIAL

## 2025-01-16 VITALS
HEART RATE: 100 BPM | OXYGEN SATURATION: 96 % | RESPIRATION RATE: 18 BRPM | HEIGHT: 68 IN | DIASTOLIC BLOOD PRESSURE: 70 MMHG | TEMPERATURE: 97 F | WEIGHT: 278.4 LBS | BODY MASS INDEX: 42.19 KG/M2 | SYSTOLIC BLOOD PRESSURE: 130 MMHG

## 2025-01-16 DIAGNOSIS — D86.9 SARCOIDOSIS: ICD-10-CM

## 2025-01-16 DIAGNOSIS — G47.33 OSA ON CPAP: Primary | ICD-10-CM

## 2025-01-16 DIAGNOSIS — J32.9 RHINOSINUSITIS: ICD-10-CM

## 2025-01-16 PROCEDURE — 99213 OFFICE O/P EST LOW 20 MIN: CPT | Performed by: INTERNAL MEDICINE

## 2025-01-16 PROCEDURE — 1124F ACP DISCUSS-NO DSCNMKR DOCD: CPT | Performed by: INTERNAL MEDICINE

## 2025-01-16 PROCEDURE — G2211 COMPLEX E/M VISIT ADD ON: HCPCS | Performed by: INTERNAL MEDICINE

## 2025-01-16 NOTE — PROGRESS NOTES
Chief complaint  This is a 64 y.o. 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  She has been seen at [facility] and symptoms are controlled.  She is tired a lot.  Does sleep on recliner before sleeping in bed.  Has been for years.  Reads to fall asleep as well       Machine:  Patient is using an APAP machine.  Average usage is 6 hrs 19 min 00 sec.  She is meeting 4 or more hours per night 96% of the time.  Average AHI is 0.7.  Sleeps in the recliner some before formally going to bed       Current Outpatient Medications:     azelastine (ASTELIN) 0.1 % nasal spray, 2 sprays by Nasal route 2 times daily Use in each nostril as directed, Disp: 1 each, Rfl: 5    clopidogrel (PLAVIX) 75 MG tablet, TAKE 1 TABLET DAILY, Disp: 90 tablet, Rfl: 3    furosemide (LASIX) 40 MG tablet, TAKE 1 TABLET DAILY, Disp: 90 tablet, Rfl: 3    atorvastatin (LIPITOR) 10 MG tablet, Take 1 tablet by mouth nightly, Disp: , Rfl:     cyclobenzaprine (FLEXERIL) 10 MG tablet, Take 1 tablet by mouth nightly, Disp: , Rfl:     DULoxetine (CYMBALTA) 60 MG extended release capsule, Take 1 capsule by mouth nightly, Disp: , Rfl:     rOPINIRole (REQUIP) 3 MG tablet, Take 1 tablet by mouth nightly, Disp: , Rfl:     montelukast (SINGULAIR) 10 MG tablet, Take 1 tablet by mouth nightly, Disp: , Rfl:     omeprazole (PRILOSEC) 20 MG capsule, Take 1 capsule by mouth nightly, Disp: , Rfl:     albuterol sulfate HFA (PROVENTIL;VENTOLIN;PROAIR) 108 (90 Base) MCG/ACT inhaler, Inhale 2 puffs into the lungs every 6 hours as needed, Disp: , Rfl:       PHYSICAL EXAM:  Vitals:    01/16/25 0822   BP: 130/70   Pulse: 100   Resp: 18   Temp: 97 °F (36.1 °C)   SpO2: 96%       GENERAL:  Well nourished, alert, appears stated age, no distress.   HEENT:  No scleral icterus, no

## 2025-07-22 ENCOUNTER — OFFICE VISIT (OUTPATIENT)
Dept: PULMONOLOGY | Age: 65
End: 2025-07-22
Payer: MEDICARE

## 2025-07-22 VITALS
TEMPERATURE: 97.8 F | WEIGHT: 286.8 LBS | DIASTOLIC BLOOD PRESSURE: 76 MMHG | BODY MASS INDEX: 45.01 KG/M2 | OXYGEN SATURATION: 98 % | HEIGHT: 67 IN | RESPIRATION RATE: 18 BRPM | HEART RATE: 104 BPM | SYSTOLIC BLOOD PRESSURE: 130 MMHG

## 2025-07-22 DIAGNOSIS — J32.9 RHINOSINUSITIS: ICD-10-CM

## 2025-07-22 DIAGNOSIS — G47.33 OSA ON CPAP: Primary | ICD-10-CM

## 2025-07-22 DIAGNOSIS — D86.9 SARCOIDOSIS: ICD-10-CM

## 2025-07-22 PROCEDURE — 3017F COLORECTAL CA SCREEN DOC REV: CPT | Performed by: INTERNAL MEDICINE

## 2025-07-22 PROCEDURE — G8400 PT W/DXA NO RESULTS DOC: HCPCS | Performed by: INTERNAL MEDICINE

## 2025-07-22 PROCEDURE — 99214 OFFICE O/P EST MOD 30 MIN: CPT | Performed by: INTERNAL MEDICINE

## 2025-07-22 PROCEDURE — 1036F TOBACCO NON-USER: CPT | Performed by: INTERNAL MEDICINE

## 2025-07-22 PROCEDURE — G8427 DOCREV CUR MEDS BY ELIG CLIN: HCPCS | Performed by: INTERNAL MEDICINE

## 2025-07-22 PROCEDURE — 1159F MED LIST DOCD IN RCRD: CPT | Performed by: INTERNAL MEDICINE

## 2025-07-22 PROCEDURE — G2211 COMPLEX E/M VISIT ADD ON: HCPCS | Performed by: INTERNAL MEDICINE

## 2025-07-22 PROCEDURE — G8417 CALC BMI ABV UP PARAM F/U: HCPCS | Performed by: INTERNAL MEDICINE

## 2025-07-22 PROCEDURE — 1124F ACP DISCUSS-NO DSCNMKR DOCD: CPT | Performed by: INTERNAL MEDICINE

## 2025-07-22 PROCEDURE — 1090F PRES/ABSN URINE INCON ASSESS: CPT | Performed by: INTERNAL MEDICINE

## 2025-07-22 NOTE — PATIENT INSTRUCTIONS
Use albuterol as needed    Continue with singulair and astelin    Continue with CPAP    Follow up in 6 months

## 2025-07-22 NOTE — PROGRESS NOTES
Chief complaint  This is a 64 y.o. year old female  who comes to see me with a chief complaint of   Chief Complaint   Patient presents with    Follow-up    Sleep Apnea    Sarcoidosis         HPI  Here with a cc of Sarcoidosis, TALI      Machine:  Patient is using an APAP machine.  Average usage is 7 hrs 11 min 00 sec.  She is meeting 4 or more hours per night 94% of the time.  Average AHI is 0.7.      Recently had rotator cuff surgery and the Anesthesiologist told her she has COPD.  That is not the case    Recently used albuterol for the first time in awhile    Will be soon stopping methotrexate as her sarcoidosis seems to be in remission           Current Outpatient Medications:     azelastine (ASTELIN) 0.1 % nasal spray, 2 sprays by Nasal route 2 times daily Use in each nostril as directed, Disp: 1 each, Rfl: 5    clopidogrel (PLAVIX) 75 MG tablet, TAKE 1 TABLET DAILY, Disp: 90 tablet, Rfl: 3    furosemide (LASIX) 40 MG tablet, TAKE 1 TABLET DAILY, Disp: 90 tablet, Rfl: 3    atorvastatin (LIPITOR) 10 MG tablet, Take 1 tablet by mouth nightly, Disp: , Rfl:     cyclobenzaprine (FLEXERIL) 10 MG tablet, Take 1 tablet by mouth nightly, Disp: , Rfl:     DULoxetine (CYMBALTA) 60 MG extended release capsule, Take 1 capsule by mouth nightly, Disp: , Rfl:     rOPINIRole (REQUIP) 3 MG tablet, Take 1 tablet by mouth nightly, Disp: , Rfl:     montelukast (SINGULAIR) 10 MG tablet, Take 1 tablet by mouth nightly, Disp: , Rfl:     omeprazole (PRILOSEC) 20 MG capsule, Take 1 capsule by mouth nightly, Disp: , Rfl:     albuterol sulfate HFA (PROVENTIL;VENTOLIN;PROAIR) 108 (90 Base) MCG/ACT inhaler, Inhale 2 puffs into the lungs every 6 hours as needed, Disp: , Rfl:       PHYSICAL EXAM:  Vitals:    07/22/25 0830   BP: 130/76   Pulse: (!) 104   Resp: 18   Temp: 97.8 °F (36.6 °C)   SpO2: 98%         GENERAL:  Well nourished, alert, appears stated age, no distress.   HEENT:  No scleral icterus, no conjunctival irritation  NECK:  No

## (undated) DEVICE — TOWEL,STOP FLAG GOLD N-W: Brand: MEDLINE

## (undated) DEVICE — MASK, AEROSOL, ELONGATED, ADULT: Brand: MEDLINE

## (undated) DEVICE — LAMINECTOMY: Brand: MEDLINE INDUSTRIES, INC.

## (undated) DEVICE — STERILE POLYISOPRENE POWDER-FREE SURGICAL GLOVES: Brand: PROTEXIS

## (undated) DEVICE — SSC BONE WAX: Brand: SSC BONE WAX

## (undated) DEVICE — MASK CAPNOGRAPHY AD W35IN DIA58IN SAMP LN L10FT O2 LN

## (undated) DEVICE — UNIVERSAL BLOCK TRAY: Brand: MEDLINE INDUSTRIES, INC.

## (undated) DEVICE — SNARE ENDOSCP L240CM SHTH DIA24MM LOOP W10MM POLYP RND REINF

## (undated) DEVICE — SUTURE VCRL SZ 2-0 L18IN ABSRB UD CT-1 L36MM 1/2 CIR J839D

## (undated) DEVICE — MAJ-1414 SINGLE USE ADPATER BIOPSY VALV: Brand: SINGLE USE ADAPTOR BIOPSY VALVE

## (undated) DEVICE — APPLICATOR PREP 26ML 0.7% IOD POVACRYLEX 74% ISO ALC ST

## (undated) DEVICE — SUTURE MCRYL SZ 4-0 L27IN ABSRB UD L19MM PS-2 1/2 CIR PRIM Y426H

## (undated) DEVICE — GAUZE,SPONGE,4"X4",16PLY,STRL,LF,10/TRAY: Brand: MEDLINE

## (undated) DEVICE — 60 ML SYRINGE REGULAR TIP: Brand: MONOJECT

## (undated) DEVICE — TOWEL OR BLUEE 16X26IN ST 8 PACK ORB08 16X26ORTWL

## (undated) DEVICE — NEBULIZER,KIT,T-MOUTHPIECE,6"RESER,7'TUB: Brand: MEDLINE

## (undated) DEVICE — SINGLE USE BIOPSY VALVE MAJ-210: Brand: SINGLE USE BIOPSY VALVE (STERILE)

## (undated) DEVICE — ALCOHOL RUBBING 16OZ 70% ISO

## (undated) DEVICE — SYRINGE MED 10ML POLYPR LUERSLIP TIP FLAT TOP W/O SFTY DISP

## (undated) DEVICE — SUTURE VCRL SZ 3-0 L18IN ABSRB UD L26MM SH 1/2 CIR J864D

## (undated) DEVICE — SINGLE USE SUCTION VALVE MAJ-209: Brand: SINGLE USE SUCTION VALVE (STERILE)

## (undated) DEVICE — ENDOSCOPIC KIT 2 12 FT OP4 DE2 GWN SYR

## (undated) DEVICE — SOLUTION IV 1000ML 0.9% SOD CHL

## (undated) DEVICE — TRAP SPEC POLYPR SGL CHMBR FN MESH SCRN

## (undated) DEVICE — Device: Brand: BALLOON

## (undated) DEVICE — NEEDLE ASPIR 21GA L700MM US GUID TREAT DST END FOR EFFICIENT

## (undated) DEVICE — ELECTRODE,ECG,STRESS,FOAM,3PK: Brand: MEDLINE

## (undated) DEVICE — SHEET, ORTHO, SPLIT, STERILE: Brand: MEDLINE

## (undated) DEVICE — GLOVE ORTHO 8   MSG9480

## (undated) DEVICE — 3M™ IOBAN™ 2 ANTIMICROBIAL INCISE DRAPE 6640EZ: Brand: IOBAN™ 2